# Patient Record
Sex: MALE | Race: WHITE | Employment: OTHER
[De-identification: names, ages, dates, MRNs, and addresses within clinical notes are randomized per-mention and may not be internally consistent; named-entity substitution may affect disease eponyms.]

---

## 2017-01-05 ENCOUNTER — PRIOR ORIGINAL RECORDS (OUTPATIENT)
Dept: OTHER | Age: 72
End: 2017-01-05

## 2017-01-12 ENCOUNTER — PRIOR ORIGINAL RECORDS (OUTPATIENT)
Dept: OTHER | Age: 72
End: 2017-01-12

## 2017-04-25 ENCOUNTER — PRIOR ORIGINAL RECORDS (OUTPATIENT)
Dept: OTHER | Age: 72
End: 2017-04-25

## 2017-04-25 ENCOUNTER — HOSPITAL ENCOUNTER (OUTPATIENT)
Dept: LAB | Facility: HOSPITAL | Age: 72
Discharge: HOME | End: 2017-04-25
Attending: INTERNAL MEDICINE
Payer: MEDICARE

## 2017-04-25 PROCEDURE — 80053 COMPREHEN METABOLIC PANEL: CPT | Performed by: INTERNAL MEDICINE

## 2017-04-25 PROCEDURE — 80061 LIPID PANEL: CPT | Performed by: INTERNAL MEDICINE

## 2017-04-25 PROCEDURE — 36415 COLL VENOUS BLD VENIPUNCTURE: CPT | Performed by: INTERNAL MEDICINE

## 2017-05-01 LAB
ALBUMIN: 3.7 G/DL
ALKALINE PHOSPHATATE(ALK PHOS): 85 IU/L
BILIRUBIN TOTAL: 0.5 MG/DL
BUN: 17 MG/DL
CALCIUM: 9.3 MG/DL
CHLORIDE: 102 MEQ/L
CHOLESTEROL, TOTAL: 133 MG/DL
CREATININE, SERUM: 1.04 MG/DL
GLUCOSE: 194 MG/DL
HDL CHOLESTEROL: 36 MG/DL
LDL CHOLESTEROL: 74 MG/DL
POTASSIUM, SERUM: 4 MEQ/L
PROTEIN, TOTAL: 7 G/DL
SGOT (AST): 12 IU/L
SGPT (ALT): 24 IU/L
SODIUM: 135 MEQ/L
TRIGLYCERIDES: 114 MG/DL

## 2017-08-08 PROBLEM — I15.2 HYPERTENSION ASSOCIATED WITH DIABETES (HCC): Status: ACTIVE | Noted: 2017-08-08

## 2017-08-08 PROBLEM — Z79.4 TYPE 2 DIABETES MELLITUS WITH OTHER CIRCULATORY COMPLICATION, WITH LONG-TERM CURRENT USE OF INSULIN (HCC): Status: ACTIVE | Noted: 2017-08-08

## 2017-08-08 PROBLEM — I15.2 HYPERTENSION ASSOCIATED WITH DIABETES: Status: ACTIVE | Noted: 2017-08-08

## 2017-08-08 PROBLEM — E11.59 TYPE 2 DIABETES MELLITUS WITH OTHER CIRCULATORY COMPLICATION, WITH LONG-TERM CURRENT USE OF INSULIN (HCC): Status: ACTIVE | Noted: 2017-08-08

## 2017-08-08 PROBLEM — E11.59 HYPERTENSION ASSOCIATED WITH DIABETES: Status: ACTIVE | Noted: 2017-08-08

## 2017-08-08 PROBLEM — E11.59 HYPERTENSION ASSOCIATED WITH DIABETES (HCC): Status: ACTIVE | Noted: 2017-08-08

## 2017-11-14 ENCOUNTER — PRIOR ORIGINAL RECORDS (OUTPATIENT)
Dept: OTHER | Age: 72
End: 2017-11-14

## 2017-11-17 LAB
ALBUMIN: 4 G/DL
ALKALINE PHOSPHATATE(ALK PHOS): 92 IU/L
BILIRUBIN TOTAL: 0.4 MG/DL
BUN: 20 MG/DL
CALCIUM: 9.4 MG/DL
CHLORIDE: 97 MEQ/L
CHOLESTEROL, TOTAL: 143 MG/DL
CREATININE, SERUM: 0.9 MG/DL
GLOBULIN: 2.8 G/DL
GLUCOSE: 253 MG/DL
HDL CHOLESTEROL: 33 MG/DL
HEMATOCRIT: 46.7 %
HEMOGLOBIN A1C: 9.8 %
HEMOGLOBIN: 15.9 G/DL
LDL CHOLESTEROL: 90 MG/DL
PLATELETS: 240 K/UL
POTASSIUM, SERUM: 4.3 MEQ/L
PROTEIN, TOTAL: 6.8 G/DL
RED BLOOD COUNT: 5.16 X 10-6/U
SGOT (AST): 12 IU/L
SGPT (ALT): 15 IU/L
SODIUM: 138 MEQ/L
TRIGLYCERIDES: 99 MG/DL
WHITE BLOOD COUNT: 8.4 X 10-3/U

## 2017-11-20 ENCOUNTER — MYAURORA ACCOUNT LINK (OUTPATIENT)
Dept: OTHER | Age: 72
End: 2017-11-20

## 2017-11-20 ENCOUNTER — PRIOR ORIGINAL RECORDS (OUTPATIENT)
Dept: OTHER | Age: 72
End: 2017-11-20

## 2017-12-07 ENCOUNTER — HOSPITAL ENCOUNTER (OUTPATIENT)
Dept: CV DIAGNOSTICS | Facility: HOSPITAL | Age: 72
Discharge: HOME | End: 2017-12-07
Attending: INTERNAL MEDICINE
Payer: MEDICARE

## 2017-12-07 DIAGNOSIS — I25.10 CORONARY ATHEROSCLEROSIS OF NATIVE CORONARY ARTERY: ICD-10-CM

## 2017-12-07 PROCEDURE — 78452 HT MUSCLE IMAGE SPECT MULT: CPT | Performed by: INTERNAL MEDICINE

## 2017-12-07 PROCEDURE — 93018 CV STRESS TEST I&R ONLY: CPT | Performed by: INTERNAL MEDICINE

## 2017-12-07 PROCEDURE — 93017 CV STRESS TEST TRACING ONLY: CPT | Performed by: INTERNAL MEDICINE

## 2017-12-07 NOTE — PROGRESS NOTES
Patient completed his exercise Nuc Stress Test without symptoms however the Nuclear Tech noted a possible change on the post exercise images.  Dr. Tawanda Hermosillo was notified and said that she will follow up with Dr. Rachel Wilkes and discuss the test with t

## 2017-12-19 ENCOUNTER — PRIOR ORIGINAL RECORDS (OUTPATIENT)
Dept: OTHER | Age: 72
End: 2017-12-19

## 2017-12-20 ENCOUNTER — PRIOR ORIGINAL RECORDS (OUTPATIENT)
Dept: OTHER | Age: 72
End: 2017-12-20

## 2017-12-27 NOTE — HISTORICAL OFFICE NOTE
DAYAN MATT  : 1945  ACCOUNT:  [id number]  [phone]  PCP:    TODAY'S DATE: 2017  DICTATED BY:  GEORGI Mansfield   ]      CHIEF COMPLAINT: [Followup of .  CAD, of native vessels.]    HPI:    [On 2017, Kaylin Shepard, a 67year old m CAD. Negative for AAA. SOCIAL HISTORY: SMOKING: Former tobacco use. smokes cigars, advised to quit and quit 12/09. CAFFEINE: 2 sodas daily. ALCOHOL: drinks rarely and wine. EXERCISE: walks. DIET: low fat, low cholesterol. MARITAL STATUS: .  Joyce Lucia Atherosclerosis, extremity, bilateral legs  4. Coronary angioplasty status  5. DM, Type II  6. Dyspnea, unspecified  7. Family History Of Ischemic Heart Disease  8. History of CABG  9. History of smoking  10. Hypercholesteremia, pure  11.  Precordial pain

## 2017-12-27 NOTE — HISTORICAL OFFICE NOTE
DAYNA MATT  : 1945  ACCOUNT:  [id number]  [id number]  PCP:    TODAY'S DATE: 2016  DICTATED BY:  Vidal Choi MD]    CHIEF COMPLAINT: [Followup of .  CAD, of native vessels, Followup of Atherosclerosis of coronary artery bypass graft(s) wit SIGNS: [B/P - 132/70 , Pulse - 64, Weight -  210, Height -   71 , BMI - 29.3 ]    CONS: comfortable. WEIGHT: Discussed and diet, exercise program prescribed. HEAD/FACE: no trauma and normocephalic. EYES: conjunctivae not injected and no xanthelasma.  ENT: m *Toprol XL            25MG      ONE TABLET BY MOUTH ONE TIME DAILY       04/14/15 Aspirin               81MG      4 tablets daily                          04/14/15 Clopidogrel Bisulfate 75MG      1 tablet daily                           04/14/15 Metoprolol

## 2017-12-29 ENCOUNTER — HOSPITAL ENCOUNTER (OUTPATIENT)
Dept: LAB | Facility: HOSPITAL | Age: 72
Discharge: HOME | End: 2017-12-29
Attending: INTERNAL MEDICINE
Payer: MEDICARE

## 2017-12-29 ENCOUNTER — HOSPITAL ENCOUNTER (OUTPATIENT)
Dept: GENERAL RADIOLOGY | Facility: HOSPITAL | Age: 72
Discharge: HOME | End: 2017-12-29
Attending: INTERNAL MEDICINE
Payer: MEDICARE

## 2017-12-29 DIAGNOSIS — I25.10 CAD (CORONARY ARTERY DISEASE): ICD-10-CM

## 2017-12-29 LAB
BUN BLD-MCNC: 20 MG/DL (ref 8–20)
CALCIUM BLD-MCNC: 9.7 MG/DL (ref 8.3–10.3)
CHLORIDE: 104 MMOL/L (ref 101–111)
CO2: 29 MMOL/L (ref 22–32)
CREAT BLD-MCNC: 0.97 MG/DL (ref 0.7–1.3)
ERYTHROCYTE [DISTWIDTH] IN BLOOD BY AUTOMATED COUNT: 12.5 % (ref 11.5–16)
GLUCOSE BLD-MCNC: 170 MG/DL (ref 70–99)
HCT VFR BLD AUTO: 46.1 % (ref 37–53)
HGB BLD-MCNC: 15.7 G/DL (ref 13–17)
MCH RBC QN AUTO: 29.7 PG (ref 27–33.2)
MCHC RBC AUTO-ENTMCNC: 34.1 G/DL (ref 31–37)
MCV RBC AUTO: 87.3 FL (ref 80–99)
PLATELET # BLD AUTO: 238 10(3)UL (ref 150–450)
POTASSIUM SERPL-SCNC: 4.5 MMOL/L (ref 3.6–5.1)
RBC # BLD AUTO: 5.28 X10(6)UL (ref 3.8–5.8)
RED CELL DISTRIBUTION WIDTH-SD: 40.1 FL (ref 35.1–46.3)
SODIUM SERPL-SCNC: 138 MMOL/L (ref 136–144)
WBC # BLD AUTO: 8.2 X10(3) UL (ref 4–13)

## 2017-12-29 PROCEDURE — 71020 XR CHEST PA + LAT CHEST (CPT=71020): CPT | Performed by: INTERNAL MEDICINE

## 2017-12-29 PROCEDURE — 36415 COLL VENOUS BLD VENIPUNCTURE: CPT | Performed by: INTERNAL MEDICINE

## 2017-12-29 PROCEDURE — 85027 COMPLETE CBC AUTOMATED: CPT | Performed by: INTERNAL MEDICINE

## 2017-12-29 PROCEDURE — 80048 BASIC METABOLIC PNL TOTAL CA: CPT | Performed by: INTERNAL MEDICINE

## 2018-01-03 ENCOUNTER — HOSPITAL ENCOUNTER (OUTPATIENT)
Dept: INTERVENTIONAL RADIOLOGY/VASCULAR | Facility: HOSPITAL | Age: 73
Setting detail: OBSERVATION
Discharge: HOME | End: 2018-01-04
Attending: INTERNAL MEDICINE | Admitting: INTERNAL MEDICINE
Payer: MEDICARE

## 2018-01-03 DIAGNOSIS — I25.10 CAD (CORONARY ARTERY DISEASE): ICD-10-CM

## 2018-01-03 PROCEDURE — 82962 GLUCOSE BLOOD TEST: CPT

## 2018-01-03 PROCEDURE — 80048 BASIC METABOLIC PNL TOTAL CA: CPT | Performed by: INTERNAL MEDICINE

## 2018-01-03 PROCEDURE — 93454 CORONARY ARTERY ANGIO S&I: CPT

## 2018-01-03 PROCEDURE — 85730 THROMBOPLASTIN TIME PARTIAL: CPT | Performed by: INTERNAL MEDICINE

## 2018-01-03 PROCEDURE — 83036 HEMOGLOBIN GLYCOSYLATED A1C: CPT | Performed by: NURSE PRACTITIONER

## 2018-01-03 PROCEDURE — 85027 COMPLETE CBC AUTOMATED: CPT | Performed by: INTERNAL MEDICINE

## 2018-01-03 RX ORDER — HEPARIN SODIUM AND DEXTROSE 10000; 5 [USP'U]/100ML; G/100ML
INJECTION INTRAVENOUS CONTINUOUS
Status: DISCONTINUED | OUTPATIENT
Start: 2018-01-03 | End: 2018-01-04

## 2018-01-03 RX ORDER — ASPIRIN 81 MG/1
TABLET, CHEWABLE ORAL
Status: COMPLETED
Start: 2018-01-03 | End: 2018-01-03

## 2018-01-03 RX ORDER — LIDOCAINE HYDROCHLORIDE 10 MG/ML
INJECTION, SOLUTION EPIDURAL; INFILTRATION; INTRACAUDAL; PERINEURAL
Status: COMPLETED
Start: 2018-01-03 | End: 2018-01-03

## 2018-01-03 RX ORDER — SODIUM CHLORIDE 9 MG/ML
INJECTION, SOLUTION INTRAVENOUS CONTINUOUS
Status: ACTIVE | OUTPATIENT
Start: 2018-01-03 | End: 2018-01-03

## 2018-01-03 RX ORDER — ASPIRIN 81 MG/1
324 TABLET, CHEWABLE ORAL ONCE
Status: DISCONTINUED | OUTPATIENT
Start: 2018-01-03 | End: 2018-01-03 | Stop reason: HOSPADM

## 2018-01-03 RX ORDER — HEPARIN SODIUM 5000 [USP'U]/ML
INJECTION, SOLUTION INTRAVENOUS; SUBCUTANEOUS
Status: COMPLETED
Start: 2018-01-03 | End: 2018-01-03

## 2018-01-03 RX ORDER — PANTOPRAZOLE SODIUM 20 MG/1
20 TABLET, DELAYED RELEASE ORAL
Status: DISCONTINUED | OUTPATIENT
Start: 2018-01-04 | End: 2018-01-04

## 2018-01-03 RX ORDER — DEXTROSE MONOHYDRATE 25 G/50ML
50 INJECTION, SOLUTION INTRAVENOUS
Status: DISCONTINUED | OUTPATIENT
Start: 2018-01-03 | End: 2018-01-04

## 2018-01-03 RX ORDER — ASPIRIN 81 MG/1
81 TABLET ORAL DAILY
Status: DISCONTINUED | OUTPATIENT
Start: 2018-01-04 | End: 2018-01-04

## 2018-01-03 RX ORDER — MIDAZOLAM HYDROCHLORIDE 1 MG/ML
INJECTION INTRAMUSCULAR; INTRAVENOUS
Status: COMPLETED
Start: 2018-01-03 | End: 2018-01-03

## 2018-01-03 RX ORDER — SODIUM CHLORIDE 9 MG/ML
INJECTION, SOLUTION INTRAVENOUS CONTINUOUS
Status: DISCONTINUED | OUTPATIENT
Start: 2018-01-03 | End: 2018-01-04

## 2018-01-03 RX ORDER — METOPROLOL SUCCINATE 25 MG/1
25 TABLET, EXTENDED RELEASE ORAL
Status: DISCONTINUED | OUTPATIENT
Start: 2018-01-04 | End: 2018-01-04

## 2018-01-03 RX ORDER — CLOPIDOGREL BISULFATE 75 MG/1
TABLET ORAL
Status: COMPLETED
Start: 2018-01-03 | End: 2018-01-03

## 2018-01-03 RX ORDER — HEPARIN SODIUM AND DEXTROSE 10000; 5 [USP'U]/100ML; G/100ML
1000 INJECTION INTRAVENOUS ONCE
Status: COMPLETED | OUTPATIENT
Start: 2018-01-03 | End: 2018-01-03

## 2018-01-03 RX ADMIN — HEPARIN SODIUM AND DEXTROSE 1000 UNITS/HR: 10000; 5 INJECTION INTRAVENOUS at 21:48:00

## 2018-01-03 RX ADMIN — CLOPIDOGREL BISULFATE 75 MG: 75 TABLET ORAL at 12:22:00

## 2018-01-03 RX ADMIN — SODIUM CHLORIDE: 9 INJECTION, SOLUTION INTRAVENOUS at 12:00:00

## 2018-01-03 NOTE — H&P
History & Physical Examination    Patient Name: Good Abdul  MRN: [id number]  [facility]: [id number]  YOB: 1945    Diagnosis: CAD with prior CABG, PCI's, HLD, DM Type II, chest pain, abnormal stress test     History of Present Illness: 67year old • Esophageal reflux    • Osteoarthrosis, unspecified whether generalized or localized, unspecified site    • Other and unspecified hyperlipidemia    • Pancreatitis 1986   • Type II or unspecified type diabetes mellitus without mention of complication, no

## 2018-01-03 NOTE — PROGRESS NOTES
66 y/o male with hx of CABG, DM, stent left main, abnormal thallium and angina    RFA  Angio graft OM and PDA 90 % but now nearly occluded into PDA  Heparin overnight and discuss.

## 2018-01-04 ENCOUNTER — APPOINTMENT (OUTPATIENT)
Dept: INTERVENTIONAL RADIOLOGY/VASCULAR | Facility: HOSPITAL | Age: 73
End: 2018-01-04
Attending: INTERNAL MEDICINE
Payer: MEDICARE

## 2018-01-04 VITALS
OXYGEN SATURATION: 97 % | TEMPERATURE: 98 F | SYSTOLIC BLOOD PRESSURE: 135 MMHG | WEIGHT: 205 LBS | BODY MASS INDEX: 29.02 KG/M2 | RESPIRATION RATE: 20 BRPM | HEIGHT: 70.5 IN | DIASTOLIC BLOOD PRESSURE: 77 MMHG | HEART RATE: 67 BPM

## 2018-01-04 PROCEDURE — 85049 AUTOMATED PLATELET COUNT: CPT | Performed by: INTERNAL MEDICINE

## 2018-01-04 PROCEDURE — 82962 GLUCOSE BLOOD TEST: CPT

## 2018-01-04 PROCEDURE — 85730 THROMBOPLASTIN TIME PARTIAL: CPT | Performed by: INTERNAL MEDICINE

## 2018-01-04 PROCEDURE — B2101ZZ FLUOROSCOPY OF SINGLE CORONARY ARTERY USING LOW OSMOLAR CONTRAST: ICD-10-PCS | Performed by: INTERNAL MEDICINE

## 2018-01-04 RX ORDER — CLOPIDOGREL BISULFATE 75 MG/1
75 TABLET ORAL DAILY
Status: DISCONTINUED | OUTPATIENT
Start: 2018-01-04 | End: 2018-01-04

## 2018-01-04 RX ADMIN — ASPIRIN 81 MG: 81 TABLET ORAL at 08:46:00

## 2018-01-04 RX ADMIN — METOPROLOL SUCCINATE 25 MG: 25 TABLET, EXTENDED RELEASE ORAL at 07:02:00

## 2018-01-04 RX ADMIN — HEPARIN SODIUM AND DEXTROSE 1150 UNITS/HR: 10000; 5 INJECTION INTRAVENOUS at 04:31:00

## 2018-01-04 RX ADMIN — CLOPIDOGREL BISULFATE 75 MG: 75 TABLET ORAL at 10:15:00

## 2018-01-04 RX ADMIN — PANTOPRAZOLE SODIUM 20 MG: 20 TABLET, DELAYED RELEASE ORAL at 07:02:00

## 2018-01-04 NOTE — PROGRESS NOTES
Dr. Cort Galeazzi in to discuss plan of care with patient and family. Patient transferred to [facility] via bed. Bedside handoff given to [facility]. Right groin remains stable.

## 2018-01-04 NOTE — PROGRESS NOTES
NURSING DISCHARGE NOTE    Received orders to discharge pt. No new medications prescribed. Instructed pt about follow-up appointments per MD order. Provided home care instructions s/p angiogram. Pt verbalized understanding. All questions answered.     Re

## 2018-01-04 NOTE — PLAN OF CARE
CARDIOVASCULAR - ADULT    • Maintains optimal cardiac output and hemodynamic stability Progressing    • Absence of cardiac arrhythmias or at baseline Progressing          Assumed care of pt at 0700. Denies any pain or SOB on RA. Sinus rhythm, HR 60s.  Hepa

## 2018-01-04 NOTE — PROGRESS NOTES
[facility]  Cardiology Progress Note    [facility] SURGERY Patient Status:  Observation    2/3/1945 MRN [id number]   [facility] 8NE-A Attending Farzaneh Holley MD   Hosp Day # 0 PCP Ian BERRY     Subjective:  Doing well with no chest pa normal EF  · DM  · HLD     Plan: Discussed with Dr. Suasna Casanova who reviewed cath in detail with family and Mr. Palak Francisco     · Home today. · Continue aggressive medical management. · Resume metformin 48 hrs post cath.    · Follow up in the office with GEORGI arevalo

## 2018-01-04 NOTE — PLAN OF CARE
Patient up from cath lab at 31 26 62. Admission completed. Right groin had old drainage, appears stable. Right groin soft, non-tender. Vitals are stable. Report given to night shift RN.

## 2018-01-04 NOTE — CM/SW NOTE
01/04/18 1200   CM/SW Screening   Referral Source Social Work (self-referral)   Information Source Chart review;Nursing rounds   Patient was screened during rounds and no needs are identified at this time. RN to contact SW/CM if needs arise.

## 2018-01-15 ENCOUNTER — MYAURORA ACCOUNT LINK (OUTPATIENT)
Dept: OTHER | Age: 73
End: 2018-01-15

## 2018-01-15 ENCOUNTER — PRIOR ORIGINAL RECORDS (OUTPATIENT)
Dept: OTHER | Age: 73
End: 2018-01-15

## 2018-02-13 ENCOUNTER — PRIOR ORIGINAL RECORDS (OUTPATIENT)
Dept: OTHER | Age: 73
End: 2018-02-13

## 2018-03-08 ENCOUNTER — HOSPITAL ENCOUNTER (OUTPATIENT)
Dept: LAB | Facility: HOSPITAL | Age: 73
Discharge: HOME | End: 2018-03-08
Attending: INTERNAL MEDICINE
Payer: MEDICARE

## 2018-03-08 DIAGNOSIS — E11.59 TYPE 2 DIABETES MELLITUS WITH OTHER CIRCULATORY COMPLICATION, WITH LONG-TERM CURRENT USE OF INSULIN (HCC): ICD-10-CM

## 2018-03-08 DIAGNOSIS — E11.69 HYPERLIPIDEMIA ASSOCIATED WITH TYPE 2 DIABETES MELLITUS (HCC): ICD-10-CM

## 2018-03-08 DIAGNOSIS — E11.51 DIABETES MELLITUS WITH PERIPHERAL ANGIOPATHY (HCC): ICD-10-CM

## 2018-03-08 DIAGNOSIS — Z79.4 TYPE 2 DIABETES MELLITUS WITH OTHER CIRCULATORY COMPLICATION, WITH LONG-TERM CURRENT USE OF INSULIN (HCC): ICD-10-CM

## 2018-03-08 DIAGNOSIS — E11.59 HYPERTENSION ASSOCIATED WITH DIABETES (HCC): ICD-10-CM

## 2018-03-08 DIAGNOSIS — I15.2 HYPERTENSION ASSOCIATED WITH DIABETES (HCC): ICD-10-CM

## 2018-03-08 DIAGNOSIS — E78.5 HYPERLIPIDEMIA ASSOCIATED WITH TYPE 2 DIABETES MELLITUS (HCC): ICD-10-CM

## 2018-03-08 LAB
BUN BLD-MCNC: 21 MG/DL (ref 8–20)
CALCIUM BLD-MCNC: 9.8 MG/DL (ref 8.3–10.3)
CHLORIDE: 103 MMOL/L (ref 101–111)
CO2: 30 MMOL/L (ref 22–32)
CREAT BLD-MCNC: 1.03 MG/DL (ref 0.7–1.3)
GLUCOSE BLD-MCNC: 191 MG/DL (ref 70–99)
POTASSIUM SERPL-SCNC: 4.4 MMOL/L (ref 3.6–5.1)
SODIUM SERPL-SCNC: 139 MMOL/L (ref 136–144)

## 2018-03-08 PROCEDURE — 80048 BASIC METABOLIC PNL TOTAL CA: CPT

## 2018-03-08 PROCEDURE — 84681 ASSAY OF C-PEPTIDE: CPT

## 2018-03-08 PROCEDURE — 36415 COLL VENOUS BLD VENIPUNCTURE: CPT

## 2018-03-09 LAB — C-PEPTIDE, SERUM OR PLASMA: 1.2 NG/ML

## 2018-05-15 ENCOUNTER — MYAURORA ACCOUNT LINK (OUTPATIENT)
Dept: OTHER | Age: 73
End: 2018-05-15

## 2018-05-15 ENCOUNTER — PRIOR ORIGINAL RECORDS (OUTPATIENT)
Dept: OTHER | Age: 73
End: 2018-05-15

## 2018-07-17 PROBLEM — E11.51 DIABETES MELLITUS WITH PERIPHERAL ANGIOPATHY (HCC): Status: ACTIVE | Noted: 2018-07-17

## 2018-11-16 ENCOUNTER — PRIOR ORIGINAL RECORDS (OUTPATIENT)
Dept: OTHER | Age: 73
End: 2018-11-16

## 2018-11-16 ENCOUNTER — HOSPITAL ENCOUNTER (OUTPATIENT)
Dept: LAB | Facility: HOSPITAL | Age: 73
Discharge: HOME | End: 2018-11-16
Attending: INTERNAL MEDICINE
Payer: MEDICARE

## 2018-11-16 PROCEDURE — 85025 COMPLETE CBC W/AUTO DIFF WBC: CPT | Performed by: INTERNAL MEDICINE

## 2018-11-16 PROCEDURE — 36415 COLL VENOUS BLD VENIPUNCTURE: CPT | Performed by: INTERNAL MEDICINE

## 2018-11-16 PROCEDURE — 80061 LIPID PANEL: CPT | Performed by: INTERNAL MEDICINE

## 2018-11-16 PROCEDURE — 80053 COMPREHEN METABOLIC PANEL: CPT | Performed by: INTERNAL MEDICINE

## 2018-11-20 ENCOUNTER — PRIOR ORIGINAL RECORDS (OUTPATIENT)
Dept: OTHER | Age: 73
End: 2018-11-20

## 2018-11-20 LAB
ALBUMIN: 3.9 G/DL
ALKALINE PHOSPHATATE(ALK PHOS): 81 IU/L
BILIRUBIN TOTAL: 0.6 MG/DL
BUN: 22 MG/DL
CALCIUM: 8.8 MG/DL
CHLORIDE: 104 MEQ/L
CHOLESTEROL, TOTAL: 147 MG/DL
CREATININE, SERUM: 1.02 MG/DL
GLOBULIN: 3.3 G/DL
GLUCOSE: 131 MG/DL
HDL CHOLESTEROL: 36 MG/DL
HEMATOCRIT: 47.5 %
HEMOGLOBIN: 16.2 G/DL
LDL CHOLESTEROL: 90 MG/DL
PLATELETS: 217 K/UL
POTASSIUM, SERUM: 4.2 MEQ/L
PROTEIN, TOTAL: 7.2 G/DL
RED BLOOD COUNT: 5.45 X 10-6/U
SGOT (AST): 13 IU/L
SGPT (ALT): 20 IU/L
SODIUM: 139 MEQ/L
TRIGLYCERIDES: 103 MG/DL
WHITE BLOOD COUNT: 9.2 X 10-3/U

## 2018-11-26 ENCOUNTER — PRIOR ORIGINAL RECORDS (OUTPATIENT)
Dept: OTHER | Age: 73
End: 2018-11-26

## 2018-11-27 LAB — HEMOGLOBIN A1C: 11.1 %

## 2018-12-07 ENCOUNTER — HOSPITAL ENCOUNTER (OUTPATIENT)
Dept: CV DIAGNOSTICS | Facility: HOSPITAL | Age: 73
Discharge: HOME | End: 2018-12-07
Attending: INTERNAL MEDICINE
Payer: MEDICARE

## 2018-12-07 ENCOUNTER — MYAURORA ACCOUNT LINK (OUTPATIENT)
Dept: OTHER | Age: 73
End: 2018-12-07

## 2018-12-07 ENCOUNTER — HOSPITAL ENCOUNTER (OUTPATIENT)
Dept: CV DIAGNOSTICS | Facility: HOSPITAL | Age: 73
Discharge: HOME | End: 2018-12-07
Attending: INTERNAL MEDICINE

## 2018-12-07 DIAGNOSIS — I25.10 CAD (CORONARY ARTERY DISEASE): ICD-10-CM

## 2018-12-07 DIAGNOSIS — I25.10 CORONARY ARTERIOSCLEROSIS: ICD-10-CM

## 2018-12-07 PROCEDURE — 78452 HT MUSCLE IMAGE SPECT MULT: CPT | Performed by: INTERNAL MEDICINE

## 2018-12-07 PROCEDURE — 93017 CV STRESS TEST TRACING ONLY: CPT | Performed by: INTERNAL MEDICINE

## 2018-12-07 PROCEDURE — 93018 CV STRESS TEST I&R ONLY: CPT | Performed by: INTERNAL MEDICINE

## 2018-12-10 ENCOUNTER — PRIOR ORIGINAL RECORDS (OUTPATIENT)
Dept: OTHER | Age: 73
End: 2018-12-10

## 2018-12-10 ENCOUNTER — MYAURORA ACCOUNT LINK (OUTPATIENT)
Dept: OTHER | Age: 73
End: 2018-12-10

## 2019-01-01 ENCOUNTER — EXTERNAL RECORD (OUTPATIENT)
Dept: HEALTH INFORMATION MANAGEMENT | Facility: OTHER | Age: 74
End: 2019-01-01

## 2019-02-28 VITALS
BODY MASS INDEX: 29.26 KG/M2 | SYSTOLIC BLOOD PRESSURE: 158 MMHG | DIASTOLIC BLOOD PRESSURE: 76 MMHG | WEIGHT: 209 LBS | HEIGHT: 71 IN | HEART RATE: 62 BPM

## 2019-02-28 VITALS
WEIGHT: 205 LBS | DIASTOLIC BLOOD PRESSURE: 62 MMHG | BODY MASS INDEX: 28.7 KG/M2 | HEIGHT: 71 IN | SYSTOLIC BLOOD PRESSURE: 130 MMHG | HEART RATE: 62 BPM

## 2019-02-28 VITALS
HEIGHT: 71 IN | HEART RATE: 60 BPM | SYSTOLIC BLOOD PRESSURE: 130 MMHG | DIASTOLIC BLOOD PRESSURE: 74 MMHG | WEIGHT: 210 LBS | BODY MASS INDEX: 29.4 KG/M2

## 2019-02-28 VITALS
HEART RATE: 66 BPM | BODY MASS INDEX: 28.84 KG/M2 | WEIGHT: 206 LBS | SYSTOLIC BLOOD PRESSURE: 108 MMHG | DIASTOLIC BLOOD PRESSURE: 56 MMHG | HEIGHT: 71 IN

## 2019-02-28 VITALS
SYSTOLIC BLOOD PRESSURE: 120 MMHG | HEIGHT: 71 IN | HEART RATE: 68 BPM | DIASTOLIC BLOOD PRESSURE: 64 MMHG | BODY MASS INDEX: 28.7 KG/M2 | WEIGHT: 205 LBS

## 2019-02-28 VITALS — HEART RATE: 70 BPM | SYSTOLIC BLOOD PRESSURE: 100 MMHG | WEIGHT: 204 LBS | DIASTOLIC BLOOD PRESSURE: 58 MMHG

## 2019-03-01 VITALS
BODY MASS INDEX: 29.54 KG/M2 | HEIGHT: 71 IN | SYSTOLIC BLOOD PRESSURE: 124 MMHG | WEIGHT: 211 LBS | HEART RATE: 70 BPM | DIASTOLIC BLOOD PRESSURE: 64 MMHG

## 2019-03-12 ENCOUNTER — TELEPHONE (OUTPATIENT)
Dept: CARDIOLOGY | Age: 74
End: 2019-03-12

## 2019-03-19 RX ORDER — ISOSORBIDE MONONITRATE 30 MG/1
1 TABLET, EXTENDED RELEASE ORAL DAILY
COMMUNITY
Start: 2019-01-28 | End: 2020-03-30 | Stop reason: SDUPTHER

## 2019-03-19 RX ORDER — METFORMIN HYDROCHLORIDE 500 MG/1
TABLET, EXTENDED RELEASE ORAL
COMMUNITY
Start: 2018-11-26

## 2019-03-19 RX ORDER — ATORVASTATIN CALCIUM 10 MG/1
TABLET, FILM COATED ORAL
COMMUNITY
Start: 2019-02-06 | End: 2020-03-05 | Stop reason: SDUPTHER

## 2019-03-19 RX ORDER — INSULIN GLARGINE 100 [IU]/ML
INJECTION, SOLUTION SUBCUTANEOUS
COMMUNITY
End: 2020-12-07 | Stop reason: ALTCHOICE

## 2019-03-19 RX ORDER — GLIPIZIDE 10 MG/1
1 TABLET, FILM COATED, EXTENDED RELEASE ORAL 2 TIMES DAILY
COMMUNITY
Start: 2010-12-20

## 2019-03-19 RX ORDER — METOPROLOL SUCCINATE 25 MG/1
1 TABLET, EXTENDED RELEASE ORAL DAILY
COMMUNITY
Start: 2019-02-12 | End: 2020-01-31 | Stop reason: SDUPTHER

## 2019-03-19 RX ORDER — OMEPRAZOLE 20 MG/1
CAPSULE, DELAYED RELEASE ORAL
COMMUNITY

## 2019-03-19 RX ORDER — NAPROXEN SODIUM 220 MG
TABLET ORAL
COMMUNITY

## 2019-03-19 RX ORDER — CLOPIDOGREL BISULFATE 75 MG/1
1 TABLET ORAL DAILY
COMMUNITY
Start: 2019-02-21 | End: 2020-03-05 | Stop reason: SDUPTHER

## 2019-04-04 ENCOUNTER — TELEPHONE (OUTPATIENT)
Dept: CARDIOLOGY | Age: 74
End: 2019-04-04

## 2019-05-06 PROBLEM — I25.810 ATHEROSCLEROSIS OF CORONARY ARTERY BYPASS GRAFT(S) WITHOUT ANGINA PECTORIS: Status: ACTIVE | Noted: 2019-05-06

## 2019-05-06 RX ORDER — TRAMADOL HYDROCHLORIDE 50 MG/1
50 TABLET ORAL
Refills: 0 | COMMUNITY
Start: 2019-02-27 | End: 2019-05-07 | Stop reason: SDUPTHER

## 2019-05-07 ENCOUNTER — OFFICE VISIT (OUTPATIENT)
Dept: CARDIOLOGY | Age: 74
End: 2019-05-07

## 2019-05-07 DIAGNOSIS — E78.00 PURE HYPERCHOLESTEROLEMIA: ICD-10-CM

## 2019-05-07 DIAGNOSIS — I25.10 CORONARY ARTERY DISEASE INVOLVING NATIVE HEART WITHOUT ANGINA PECTORIS, UNSPECIFIED VESSEL OR LESION TYPE: Primary | ICD-10-CM

## 2019-05-07 PROBLEM — E11.9 DIABETES MELLITUS (CMD): Status: ACTIVE | Noted: 2017-08-08

## 2019-05-07 PROCEDURE — 99214 OFFICE O/P EST MOD 30 MIN: CPT | Performed by: NURSE PRACTITIONER

## 2019-05-07 SDOH — HEALTH STABILITY: PHYSICAL HEALTH: ON AVERAGE, HOW MANY MINUTES DO YOU ENGAGE IN EXERCISE AT THIS LEVEL?: 0 MIN

## 2019-05-07 SDOH — HEALTH STABILITY: PHYSICAL HEALTH: ON AVERAGE, HOW MANY DAYS PER WEEK DO YOU ENGAGE IN MODERATE TO STRENUOUS EXERCISE (LIKE A BRISK WALK)?: 0 DAYS

## 2019-05-07 ASSESSMENT — ENCOUNTER SYMPTOMS
LOSS OF BALANCE: 1
CONSTITUTIONAL NEGATIVE: 1
RESPIRATORY NEGATIVE: 1
BRUISES/BLEEDS EASILY: 1
ENDOCRINE NEGATIVE: 1
BLURRED VISION: 1
BACK PAIN: 1
GASTROINTESTINAL NEGATIVE: 1

## 2019-05-07 ASSESSMENT — PATIENT HEALTH QUESTIONNAIRE - PHQ9
SUM OF ALL RESPONSES TO PHQ9 QUESTIONS 1 AND 2: 0
1. LITTLE INTEREST OR PLEASURE IN DOING THINGS: NOT AT ALL
SUM OF ALL RESPONSES TO PHQ9 QUESTIONS 1 AND 2: 0
2. FEELING DOWN, DEPRESSED OR HOPELESS: NOT AT ALL

## 2019-05-07 ASSESSMENT — PAIN SCALES - GENERAL: PAINLEVEL: 0

## 2019-11-11 ENCOUNTER — OFFICE VISIT (OUTPATIENT)
Dept: CARDIOLOGY | Age: 74
End: 2019-11-11

## 2019-11-11 VITALS
SYSTOLIC BLOOD PRESSURE: 104 MMHG | HEART RATE: 64 BPM | HEIGHT: 70 IN | WEIGHT: 198 LBS | DIASTOLIC BLOOD PRESSURE: 60 MMHG | BODY MASS INDEX: 28.35 KG/M2

## 2019-11-11 DIAGNOSIS — I25.810 ATHEROSCLEROSIS OF CORONARY ARTERY BYPASS GRAFT OF NATIVE HEART WITHOUT ANGINA PECTORIS: ICD-10-CM

## 2019-11-11 DIAGNOSIS — Z79.4 TYPE 2 DIABETES MELLITUS WITHOUT COMPLICATION, WITH LONG-TERM CURRENT USE OF INSULIN (CMD): ICD-10-CM

## 2019-11-11 DIAGNOSIS — Z95.1 HX OF CABG: ICD-10-CM

## 2019-11-11 DIAGNOSIS — I25.10 CORONARY ARTERY DISEASE INVOLVING NATIVE HEART WITHOUT ANGINA PECTORIS, UNSPECIFIED VESSEL OR LESION TYPE: ICD-10-CM

## 2019-11-11 DIAGNOSIS — Z95.5 PRESENCE OF DRUG COATED STENT IN LAD CORONARY ARTERY: ICD-10-CM

## 2019-11-11 DIAGNOSIS — R06.09 DYSPNEA ON EXERTION: Primary | ICD-10-CM

## 2019-11-11 DIAGNOSIS — E78.00 PURE HYPERCHOLESTEROLEMIA: ICD-10-CM

## 2019-11-11 DIAGNOSIS — E11.9 TYPE 2 DIABETES MELLITUS WITHOUT COMPLICATION, WITH LONG-TERM CURRENT USE OF INSULIN (CMD): ICD-10-CM

## 2019-11-11 PROCEDURE — 99215 OFFICE O/P EST HI 40 MIN: CPT | Performed by: INTERNAL MEDICINE

## 2019-11-11 ASSESSMENT — ENCOUNTER SYMPTOMS
HEMATOCHEZIA: 0
WEIGHT LOSS: 0
WEIGHT GAIN: 0
CHILLS: 0
SLEEP DISTURBANCES DUE TO BREATHING: 1
ALLERGIC/IMMUNOLOGIC COMMENTS: NO NEW FOOD ALLERGIES
COUGH: 0
BRUISES/BLEEDS EASILY: 0
SUSPICIOUS LESIONS: 0
FEVER: 0
HEMOPTYSIS: 0

## 2019-11-14 ENCOUNTER — TELEPHONE (OUTPATIENT)
Dept: CARDIOLOGY | Age: 74
End: 2019-11-14

## 2019-11-14 ENCOUNTER — HOSPITAL ENCOUNTER (OUTPATIENT)
Dept: GENERAL RADIOLOGY | Facility: HOSPITAL | Age: 74
Discharge: HOME | End: 2019-11-14
Attending: INTERNAL MEDICINE
Payer: MEDICARE

## 2019-11-14 ENCOUNTER — HOSPITAL ENCOUNTER (OUTPATIENT)
Dept: CT IMAGING | Facility: HOSPITAL | Age: 74
Discharge: HOME | End: 2019-11-14
Attending: INTERNAL MEDICINE
Payer: MEDICARE

## 2019-11-14 ENCOUNTER — LAB ENCOUNTER (OUTPATIENT)
Dept: LAB | Facility: HOSPITAL | Age: 74
End: 2019-11-14
Attending: INTERNAL MEDICINE
Payer: MEDICARE

## 2019-11-14 DIAGNOSIS — I25.10 CORONARY ATHEROSCLEROSIS OF NATIVE CORONARY ARTERY: ICD-10-CM

## 2019-11-14 DIAGNOSIS — E78.00 PURE HYPERCHOLESTEROLEMIA: ICD-10-CM

## 2019-11-14 DIAGNOSIS — I25.810 ATHEROSCLEROSIS OF CORONARY ARTERY BYPASS GRAFT OF NATIVE HEART WITHOUT ANGINA PECTORIS: ICD-10-CM

## 2019-11-14 DIAGNOSIS — I25.810 CORONARY ATHEROSCLEROSIS OF AUTOLOGOUS VEIN BYPASS GRAFT: ICD-10-CM

## 2019-11-14 DIAGNOSIS — I25.10 CORONARY ARTERY DISEASE INVOLVING NATIVE HEART WITHOUT ANGINA PECTORIS, UNSPECIFIED VESSEL OR LESION TYPE: ICD-10-CM

## 2019-11-14 DIAGNOSIS — Z95.1 HX OF CABG: ICD-10-CM

## 2019-11-14 DIAGNOSIS — R93.89 ABNORMAL CHEST X-RAY: Primary | ICD-10-CM

## 2019-11-14 DIAGNOSIS — R06.00 DYSPNEA ON EXERTION: ICD-10-CM

## 2019-11-14 DIAGNOSIS — R06.00 DYSPNEA ON EXERTION: Primary | ICD-10-CM

## 2019-11-14 DIAGNOSIS — Z95.1 POSTSURGICAL AORTOCORONARY BYPASS STATUS: ICD-10-CM

## 2019-11-14 DIAGNOSIS — R93.89 ABNORMAL CHEST X-RAY: ICD-10-CM

## 2019-11-14 LAB
ABSOLUTE IMMATURE GRANULOCYTES (OFFPRE24): NORMAL
ALBUMIN SERPL-MCNC: 3.7 G/DL
ALBUMIN/GLOB SERPL: NORMAL {RATIO}
ALP SERPL-CCNC: 111 U/L
ALT SERPL-CCNC: 15 U/L
ANION GAP SERPL CALC-SCNC: NORMAL MMOL/L
AST SERPL-CCNC: 10 U/L
BASO+EOS+MONOS # BLD: NORMAL 10*3/UL
BASO+EOS+MONOS NFR BLD: NORMAL %
BASOPHILS # BLD: NORMAL 10*3/UL
BASOPHILS NFR BLD: NORMAL %
BILIRUB SERPL-MCNC: 0.5 MG/DL
BUN SERPL-MCNC: 25 MG/DL
BUN/CREAT SERPL: NORMAL
CALCIUM SERPL-MCNC: 9.5 MG/DL
CHLORIDE SERPL-SCNC: 102 MMOL/L
CO2 SERPL-SCNC: NORMAL MMOL/L
CREAT SERPL-MCNC: 1.23 MG/DL
DIFFERENTIAL METHOD BLD: NORMAL
EOSINOPHIL # BLD: NORMAL 10*3/UL
EOSINOPHIL NFR BLD: NORMAL %
ERYTHROCYTE [DISTWIDTH] IN BLOOD: NORMAL %
GLOBULIN SER-MCNC: NORMAL G/DL
GLUCOSE SERPL-MCNC: 206 MG/DL
HCT VFR BLD CALC: 45.8 %
HGB BLD-MCNC: 15.1 G/DL
IMMATURE GRANULOCYTES (OFFPRE25): NORMAL
LENGTH OF FAST TIME PATIENT: NORMAL H
LYMPHOCYTES # BLD: NORMAL 10*3/UL
LYMPHOCYTES NFR BLD: NORMAL %
MCH RBC QN AUTO: NORMAL PG
MCHC RBC AUTO-ENTMCNC: NORMAL G/DL
MCV RBC AUTO: NORMAL FL
MONOCYTES # BLD: NORMAL 10*3/UL
MONOCYTES NFR BLD: NORMAL %
MPV (OFFPRE2): NORMAL
NEUTROPHILS # BLD: NORMAL 10*3/UL
NEUTROPHILS NFR BLD: NORMAL %
NRBC BLD MANUAL-RTO: NORMAL %
PLAT MORPH BLD: NORMAL
PLATELET # BLD: 280 10*3/UL
POTASSIUM SERPL-SCNC: 4.4 MMOL/L
PROT SERPL-MCNC: 7.5 G/DL
RBC # BLD: 5.27 10*6/UL
RBC MORPH BLD: NORMAL
SODIUM SERPL-SCNC: 137 MMOL/L
WBC # BLD: 10.5 10*3/UL
WBC MORPH BLD: NORMAL

## 2019-11-14 PROCEDURE — 80053 COMPREHEN METABOLIC PANEL: CPT

## 2019-11-14 PROCEDURE — 36415 COLL VENOUS BLD VENIPUNCTURE: CPT

## 2019-11-14 PROCEDURE — 71250 CT THORAX DX C-: CPT | Performed by: INTERNAL MEDICINE

## 2019-11-14 PROCEDURE — 71045 X-RAY EXAM CHEST 1 VIEW: CPT | Performed by: INTERNAL MEDICINE

## 2019-11-14 PROCEDURE — 85025 COMPLETE CBC W/AUTO DIFF WBC: CPT

## 2019-11-14 PROCEDURE — 80061 LIPID PANEL: CPT

## 2019-11-18 ENCOUNTER — TELEPHONE (OUTPATIENT)
Dept: CARDIOLOGY | Age: 74
End: 2019-11-18

## 2019-11-18 ENCOUNTER — E-ADVICE (OUTPATIENT)
Dept: CARDIOLOGY | Age: 74
End: 2019-11-18

## 2019-11-19 DIAGNOSIS — E78.00 PURE HYPERCHOLESTEROLEMIA: ICD-10-CM

## 2019-11-19 DIAGNOSIS — Z95.1 HX OF CABG: ICD-10-CM

## 2019-11-19 DIAGNOSIS — I25.810 ATHEROSCLEROSIS OF CORONARY ARTERY BYPASS GRAFT OF NATIVE HEART WITHOUT ANGINA PECTORIS: ICD-10-CM

## 2019-11-19 DIAGNOSIS — R06.09 DYSPNEA ON EXERTION: ICD-10-CM

## 2019-11-19 DIAGNOSIS — I25.10 CORONARY ARTERY DISEASE INVOLVING NATIVE HEART WITHOUT ANGINA PECTORIS, UNSPECIFIED VESSEL OR LESION TYPE: ICD-10-CM

## 2019-11-20 ENCOUNTER — HOSPITAL ENCOUNTER (OUTPATIENT)
Dept: INTERVENTIONAL RADIOLOGY/VASCULAR | Facility: HOSPITAL | Age: 74
Discharge: HOME | End: 2019-11-21
Attending: INTERNAL MEDICINE | Admitting: INTERNAL MEDICINE
Payer: MEDICARE

## 2019-11-20 DIAGNOSIS — I25.10 CAD (CORONARY ARTERY DISEASE): ICD-10-CM

## 2019-11-20 DIAGNOSIS — R91.8 MULTIPLE LUNG NODULES ON CT: Primary | ICD-10-CM

## 2019-11-20 DIAGNOSIS — R91.8 LUNG MASS: ICD-10-CM

## 2019-11-20 PROCEDURE — B2181ZZ FLUOROSCOPY OF LEFT INTERNAL MAMMARY BYPASS GRAFT USING LOW OSMOLAR CONTRAST: ICD-10-PCS | Performed by: INTERNAL MEDICINE

## 2019-11-20 PROCEDURE — B2111ZZ FLUOROSCOPY OF MULTIPLE CORONARY ARTERIES USING LOW OSMOLAR CONTRAST: ICD-10-PCS | Performed by: INTERNAL MEDICINE

## 2019-11-20 PROCEDURE — B2151ZZ FLUOROSCOPY OF LEFT HEART USING LOW OSMOLAR CONTRAST: ICD-10-PCS | Performed by: INTERNAL MEDICINE

## 2019-11-20 PROCEDURE — 93459 L HRT ART/GRFT ANGIO: CPT | Performed by: INTERNAL MEDICINE

## 2019-11-20 PROCEDURE — B2131ZZ FLUOROSCOPY OF MULTIPLE CORONARY ARTERY BYPASS GRAFTS USING LOW OSMOLAR CONTRAST: ICD-10-PCS | Performed by: INTERNAL MEDICINE

## 2019-11-20 PROCEDURE — 4A023N7 MEASUREMENT OF CARDIAC SAMPLING AND PRESSURE, LEFT HEART, PERCUTANEOUS APPROACH: ICD-10-PCS | Performed by: INTERNAL MEDICINE

## 2019-11-20 RX ORDER — ASPIRIN 81 MG/1
TABLET, CHEWABLE ORAL
Status: COMPLETED
Start: 2019-11-20 | End: 2019-11-20

## 2019-11-20 RX ORDER — ATORVASTATIN CALCIUM 10 MG/1
10 TABLET, FILM COATED ORAL NIGHTLY
Status: DISCONTINUED | OUTPATIENT
Start: 2019-11-20 | End: 2019-11-21

## 2019-11-20 RX ORDER — DEXTROSE MONOHYDRATE 25 G/50ML
50 INJECTION, SOLUTION INTRAVENOUS
Status: DISCONTINUED | OUTPATIENT
Start: 2019-11-20 | End: 2019-11-21

## 2019-11-20 RX ORDER — ISOSORBIDE MONONITRATE 30 MG/1
30 TABLET, EXTENDED RELEASE ORAL DAILY
Status: DISCONTINUED | OUTPATIENT
Start: 2019-11-20 | End: 2019-11-21

## 2019-11-20 RX ORDER — ISOSORBIDE MONONITRATE 30 MG/1
30 TABLET, EXTENDED RELEASE ORAL DAILY
COMMUNITY
End: 2022-01-11

## 2019-11-20 RX ORDER — PANTOPRAZOLE SODIUM 20 MG/1
20 TABLET, DELAYED RELEASE ORAL EVERY MORNING
Status: DISCONTINUED | OUTPATIENT
Start: 2019-11-20 | End: 2019-11-21

## 2019-11-20 RX ORDER — TEMAZEPAM 15 MG/1
15 CAPSULE ORAL NIGHTLY PRN
Status: DISCONTINUED | OUTPATIENT
Start: 2019-11-20 | End: 2019-11-21

## 2019-11-20 RX ORDER — LIDOCAINE HYDROCHLORIDE 10 MG/ML
INJECTION, SOLUTION EPIDURAL; INFILTRATION; INTRACAUDAL; PERINEURAL
Status: COMPLETED
Start: 2019-11-20 | End: 2019-11-20

## 2019-11-20 RX ORDER — ACETAMINOPHEN 325 MG/1
650 TABLET ORAL EVERY 6 HOURS PRN
Status: DISCONTINUED | OUTPATIENT
Start: 2019-11-20 | End: 2019-11-21

## 2019-11-20 RX ORDER — CLOPIDOGREL BISULFATE 75 MG/1
75 TABLET ORAL DAILY
Status: DISCONTINUED | OUTPATIENT
Start: 2019-11-21 | End: 2019-11-21

## 2019-11-20 RX ORDER — METOPROLOL SUCCINATE 25 MG/1
25 TABLET, EXTENDED RELEASE ORAL DAILY
Status: DISCONTINUED | OUTPATIENT
Start: 2019-11-20 | End: 2019-11-21

## 2019-11-20 RX ORDER — SODIUM CHLORIDE 9 MG/ML
INJECTION, SOLUTION INTRAVENOUS
Status: DISCONTINUED | OUTPATIENT
Start: 2019-11-21 | End: 2019-11-20 | Stop reason: HOSPADM

## 2019-11-20 RX ORDER — MIDAZOLAM HYDROCHLORIDE 1 MG/ML
INJECTION INTRAMUSCULAR; INTRAVENOUS
Status: COMPLETED
Start: 2019-11-20 | End: 2019-11-20

## 2019-11-20 RX ORDER — CLOPIDOGREL BISULFATE 75 MG/1
75 TABLET ORAL DAILY
Status: DISCONTINUED | OUTPATIENT
Start: 2019-11-20 | End: 2019-11-20

## 2019-11-20 RX ORDER — HEPARIN SODIUM 5000 [USP'U]/ML
INJECTION, SOLUTION INTRAVENOUS; SUBCUTANEOUS
Status: COMPLETED
Start: 2019-11-20 | End: 2019-11-20

## 2019-11-20 RX ORDER — ASPIRIN 81 MG/1
81 TABLET, CHEWABLE ORAL ONCE
Status: COMPLETED | OUTPATIENT
Start: 2019-11-20 | End: 2019-11-20

## 2019-11-20 RX ORDER — ATORVASTATIN CALCIUM 10 MG/1
10 TABLET, FILM COATED ORAL NIGHTLY
COMMUNITY

## 2019-11-20 RX ORDER — ISOSORBIDE DINITRATE 30 MG/1
30 TABLET ORAL DAILY
Status: ON HOLD | COMMUNITY
End: 2019-11-20

## 2019-11-20 RX ORDER — ASPIRIN 81 MG/1
81 TABLET ORAL DAILY
Status: DISCONTINUED | OUTPATIENT
Start: 2019-11-21 | End: 2019-11-21

## 2019-11-20 RX ADMIN — ASPIRIN 81 MG: 81 TABLET, CHEWABLE ORAL at 08:49:00

## 2019-11-20 RX ADMIN — ISOSORBIDE MONONITRATE 30 MG: 30 TABLET, EXTENDED RELEASE ORAL at 18:39:00

## 2019-11-20 RX ADMIN — ATORVASTATIN CALCIUM 10 MG: 10 TABLET, FILM COATED ORAL at 20:18:00

## 2019-11-20 RX ADMIN — METOPROLOL SUCCINATE 25 MG: 25 TABLET, EXTENDED RELEASE ORAL at 18:39:00

## 2019-11-20 NOTE — H&P
[address] [facility]    PCP: Margie Manriquez MD    Chief Complaint   Patient presents with   • Coronary Artery Disease   • Hyperlipidemia       HPI   This is a 25-year-old male with history of bypass surgery and subsequently has had Rotablator and stent 24 HR TABLET Take 1 tablet by mouth daily. METFORMIN (GLUCOPHAGE-XR) 500 MG 24 HR TABLET 1 tab 3x daily. METOPROLOL SUCCINATE (TOPROL-XL) 25 MG 24 HR TABLET 1 tablet daily.    NAPROXEN SODIUM (ALEVE) 220 MG TABLET as needed   OMEPRAZOLE (PRILOSEC) 20 MG of coronary artery bypass graft of native heart without angina pectoris   4. Hx of CABG   5. Pure hypercholesterolemia   6. Type 2 diabetes mellitus without complication, with long-term current use of insulin (CMS/[facility])   7.  Presence of drug coated stent in

## 2019-11-20 NOTE — PROGRESS NOTES
Pt s/p C with Dr. Jermaine Gordon. Pt recovered in holding. Right groin dressing cdi, no bleeding or hematoma. +pedals. Pt recovered for 2 hours. VSS. Pt ate lunch. After recovery pt up to bathroom with RN with no complaints. Right groin soft, dressing CDI.  Fami

## 2019-11-20 NOTE — PROGRESS NOTES
74 Y/o male with SOB, CABG, prior PCI, Dm    LV normal  % mid  %  RCA stents patent    LIMA patent  Sequential vein to diag, OM patent    Patient had normal CXR 2017 now abnormal with CT chest showing mass RLL with possible 2 lesions LLL.  Mario

## 2019-11-20 NOTE — CONSULTS
Zackery Garcia [address] [facility]  Pulmonary/Critical Care Consult Note  [facility]  Report of Consultation    [facility] Patient Status:  Outpatient in a Bed    2/3/1945 MRN [id number]   Location [facility] Other and unspecified hyperlipidemia    • Pancreatitis 1986   • Type II or unspecified type diabetes mellitus without mention of complication, not stated as uncontrolled    • Visual impairment      Past Surgical History:   Procedure Laterality Date   • ANG hematuria, urinary retention. Behavioral/Psych: Normal affect, mood, speech. No AVH, No SI/HI    All other review of systems are negative.     Vital signs in last 24 hours:  Patient Vitals for the past 24 hrs:   BP Temp Temp src Pulse Resp SpO2   11/20/19 10.5   .0     No results for input(s): BNP in the last 168 hours. No results for input(s): TROP, CK in the last 168 hours. No results for input(s): PT, INR, PTT in the last 168 hours.     Other Labs:     ABG:     No results for input(s): ABGPHT, AB sampling/biopsy for diagnosis. We reviewed options for biopsy which include bronchoscopy (which would include biopsy/EBUS bx of the RUL lung mass and navigational bronchoscopy at same time to biopsy the LLL nodules) or IR biopsy.  We reviewed the procedures

## 2019-11-21 VITALS
TEMPERATURE: 98 F | HEART RATE: 83 BPM | DIASTOLIC BLOOD PRESSURE: 54 MMHG | WEIGHT: 197.56 LBS | RESPIRATION RATE: 18 BRPM | SYSTOLIC BLOOD PRESSURE: 140 MMHG | BODY MASS INDEX: 28 KG/M2 | OXYGEN SATURATION: 94 %

## 2019-11-21 PROCEDURE — 99203 OFFICE O/P NEW LOW 30 MIN: CPT | Performed by: INTERNAL MEDICINE

## 2019-11-21 PROCEDURE — 99217 OBSERVATION CARE DISCHARGE: CPT | Performed by: INTERNAL MEDICINE

## 2019-11-21 RX ADMIN — PANTOPRAZOLE SODIUM 20 MG: 20 TABLET, DELAYED RELEASE ORAL at 08:55:00

## 2019-11-21 RX ADMIN — METOPROLOL SUCCINATE 25 MG: 25 TABLET, EXTENDED RELEASE ORAL at 08:55:00

## 2019-11-21 RX ADMIN — ASPIRIN 81 MG: 81 TABLET ORAL at 08:54:00

## 2019-11-21 RX ADMIN — ISOSORBIDE MONONITRATE 30 MG: 30 TABLET, EXTENDED RELEASE ORAL at 08:55:00

## 2019-11-21 NOTE — PLAN OF CARE
Rcv''d A/o/3  Denies pain or discomfort  On tele with SR   LHC today rt groin. Site soft dry and intact.  No oozing noted  Lung sounds clear bilateral  Consult for Oncology in am      Problem: CARDIOVASCULAR - ADULT  Goal: Absence of cardiac arrhythmias or

## 2019-11-21 NOTE — CONSULTS
Hematology-Oncology Consultation Note    Patient Name: Deborah Bro   YOB: 1945   Medical Record Number: [id number]   CSN: [id number]   Consulting Physician: Cony Anguiano MD  Date of Consultation: 11/21/2019     Reason for Consultation: History:   Procedure Laterality Date   • ANGIOGRAM     • ANGIOPLASTY (CORONARY)  3/7/2015    RCA   • APPENDECTOMY     • CABG  1999    x 4   • CATH DRUG ELUTING STENT     • HERNIA SURGERY     • OTHER SURGICAL HISTORY  2002    LeftTKR   • OTHER SURGICAL HIST violence:        Fear of current or ex partner: Not on file        Emotionally abused: Not on file        Physically abused: Not on file        Forced sexual activity: Not on file    Other Topics      Concerns:        Not on file    Social History Brian Gonsales 10 mg, Oral, Nightly  Isosorbide Mononitrate ER (IMDUR) 24 hr tab 30 mg, 30 mg, Oral, Daily        Review of Systems:  A 14-point ROS was done with pertinent positives and negative per the HPI    Vital Signs:  /54 (BP Location: Right arm)   Pulse 83 GLUCOSE 84 02/17/2011 0945    Glucose 127 (H) 11/21/2019 0548    Glucose 209 (H) 11/14/2019 0728    Glucose 131 (H) 11/16/2018 0919    BUN 16 11/21/2019 0548    BUN 25 (H) 11/14/2019 0728    BUN 22 (H) 11/16/2018 0919    BUN 20 09/21/2011 1220    BUN 24 (H 09/21/2011 1220    CHLORIDE 102 05/10/2011 0949    CHLORIDE 102 02/17/2011 0945    CO2 29.0 11/21/2019 0548    CO2 29.0 11/14/2019 0728    CO2 27.0 11/16/2018 0919    CO2 27.0 09/21/2011 1220    CO2 25.0 05/10/2011 0949    CO2 26.0 02/17/2011 0945    Alkal abnormal chest x-ray done today.       FINDINGS:    LUNGS:  There is a dominant spiculated mass along the right hilar tissues extending along the right major fissure, superior aspect of the right lower lobe and posterior segment of the right upper lobe tha There is a dominant spiculated mass along the right hilar tissues extending along the right major fissure, superior aspect of the right lower lobe and posterior segment of the right upper lobe measuring up to 4.8 x 3.9 x 3.0 cm.   The mass partially   encas scan of the chest is recommended. Report called by the radiology staff. Dictated by: Ewelina Clarke MD on 11/14/2019 at 8:45       Approved by: Ewelina Clarke MD on 11/14/2019 at 8:47         Impression & Plan:   1.  Bilateral lung masses- reviewed

## 2019-11-21 NOTE — PLAN OF CARE
Assumed patient care at 0730 this AM. Patient resting in bed A&Ox4, wife at bedside. SPO2 maintained >94% on RA, no c/o SOB. NSR on tele. Right groin site dressing c/d/I, no signs of bleeding or hematoma.  Patient denies pain at this time, will continue to ADULT  Goal: Incision(s), wounds(s) or drain site(s) healing without S/S of infection  Description  INTERVENTIONS:  - Assess and document risk factors for pressure ulcer development  - Assess and document skin integrity  - Assess and document dressing/inci

## 2019-11-21 NOTE — PROGRESS NOTES
[facility]  Cardiology Progress Note    Subjective:  No chest pain or shortness of breath. Family at bedside, supportive.      Objective:  /54 (BP Location: Right arm)   Pulse 83   Temp 97.9 °F (36.6 °C) (Oral)   Resp 18   Wt 197 lb 8.5 oz (89.6 independently.  Note reviewed and labs reviewed.  Agree with above assessment and plan. Cardiac status stable s/p LHC with Dr. Mindy Gonzalez yesterday showing diffusely diseased distal portion of a bypass that feeds a diagonal/OM brnach.  Limb from OM to PDA is

## 2019-11-21 NOTE — PLAN OF CARE
Received from cath lab holdign around 1515. Right groin site CDI, no evidence of hematoma, bleeding, or oozing. Remains in NSR, NL S1`, S2, RRR. Lungs clear on RA. Abdomen soft and non tender to touch with active bowel sounds in all four quadrants.   Ne

## 2019-11-21 NOTE — PROGRESS NOTES
Explained discharge instructions including medications and follow ups to the patient and his wife, verbalize understanding, PIV removed, tele monitor discontinued, will be transported via wheelchair to [employer].

## 2019-11-21 NOTE — PROGRESS NOTES
[facility] Pulmonary/Critical Care Progress Note     SUBJECTIVE/Interval history: All events, procedures, notes reviewed. No acute events, he feels \"fine' today. No complaints presently. No cough pain or dyspnea.  No f/ NEPRELIM, WBC, PLT in the last 168 hours. No results for input(s): BNP in the last 168 hours. No results for input(s): TROP, CK in the last 168 hours. No results for input(s): PT, INR, PTT in the last 168 hours.   No results for input(s): ABGPHT, ABGPCO2 metastatic disease is not excluded.    · CAD s/p CABG and PCI with multiple stents - last stent 10/2015  · former smoker (quit 2007, 50+ pack years),emphysema on CT chest  · DM  · HTN      PLAN     · Previously reviewed the imaging results, differential and

## 2019-11-22 ENCOUNTER — DOCUMENTATION (OUTPATIENT)
Dept: CARDIOLOGY | Age: 74
End: 2019-11-22

## 2019-11-22 ENCOUNTER — CLINICAL ABSTRACT (OUTPATIENT)
Dept: CARDIOLOGY | Age: 74
End: 2019-11-22

## 2019-11-22 ENCOUNTER — TELEPHONE (OUTPATIENT)
Dept: CARDIOLOGY | Age: 74
End: 2019-11-22

## 2019-12-03 ENCOUNTER — TELEPHONE (OUTPATIENT)
Dept: HEMATOLOGY/ONCOLOGY | Facility: HOSPITAL | Age: 74
End: 2019-12-03

## 2019-12-03 DIAGNOSIS — R91.8 LUNG MASS: Primary | ICD-10-CM

## 2019-12-03 DIAGNOSIS — K76.9 LESION OF LIVER: ICD-10-CM

## 2019-12-04 ENCOUNTER — TELEPHONE (OUTPATIENT)
Dept: HEMATOLOGY/ONCOLOGY | Facility: HOSPITAL | Age: 74
End: 2019-12-04

## 2019-12-04 ENCOUNTER — TELEPHONE (OUTPATIENT)
Dept: CARDIOLOGY | Age: 74
End: 2019-12-04

## 2019-12-04 RX ORDER — COVID-19 ANTIGEN TEST
220 KIT MISCELLANEOUS DAILY PRN
COMMUNITY

## 2019-12-04 NOTE — TELEPHONE ENCOUNTER
Jose D Lawrence with PET results and plan for biopsy of one of the liver lesions. Will need to hold ASA and Plavix prior and he will call his cardiologist that this is ok.  Biopsy scheduled through IR

## 2019-12-04 NOTE — TELEPHONE ENCOUNTER
Maria Lopez called to speak with somebody about getting a cardiac dena form sent to Dr. Deya Sutton office. She asked that she please get a call back to explain in more detail as to what needs to be done.  Please call back

## 2019-12-10 ENCOUNTER — HOSPITAL ENCOUNTER (OUTPATIENT)
Dept: ULTRASOUND IMAGING | Facility: HOSPITAL | Age: 74
Discharge: HOME | End: 2019-12-10
Attending: INTERNAL MEDICINE
Payer: MEDICARE

## 2019-12-10 ENCOUNTER — APPOINTMENT (OUTPATIENT)
Dept: LAB | Facility: HOSPITAL | Age: 74
End: 2019-12-10
Attending: RADIOLOGY
Payer: MEDICARE

## 2019-12-10 VITALS
WEIGHT: 190 LBS | HEIGHT: 70 IN | RESPIRATION RATE: 16 BRPM | BODY MASS INDEX: 27.2 KG/M2 | DIASTOLIC BLOOD PRESSURE: 79 MMHG | HEART RATE: 87 BPM | TEMPERATURE: 98 F | SYSTOLIC BLOOD PRESSURE: 124 MMHG | OXYGEN SATURATION: 95 %

## 2019-12-10 DIAGNOSIS — R91.8 LUNG MASS: ICD-10-CM

## 2019-12-10 DIAGNOSIS — K76.9 LESION OF LIVER: ICD-10-CM

## 2019-12-10 DIAGNOSIS — R91.8 LUNG MASS: Primary | ICD-10-CM

## 2019-12-10 DIAGNOSIS — C34.90 METASTATIC NON-SMALL CELL LUNG CANCER (HCC): ICD-10-CM

## 2019-12-10 PROCEDURE — 88360 TUMOR IMMUNOHISTOCHEM/MANUAL: CPT | Performed by: INTERNAL MEDICINE

## 2019-12-10 PROCEDURE — 88381 MICRODISSECTION MANUAL: CPT | Performed by: INTERNAL MEDICINE

## 2019-12-10 PROCEDURE — 88307 TISSUE EXAM BY PATHOLOGIST: CPT | Performed by: INTERNAL MEDICINE

## 2019-12-10 PROCEDURE — 99152 MOD SED SAME PHYS/QHP 5/>YRS: CPT | Performed by: INTERNAL MEDICINE

## 2019-12-10 PROCEDURE — 47000 NEEDLE BIOPSY OF LIVER PERQ: CPT | Performed by: INTERNAL MEDICINE

## 2019-12-10 PROCEDURE — 85610 PROTHROMBIN TIME: CPT

## 2019-12-10 PROCEDURE — 81235 EGFR GENE COM VARIANTS: CPT | Performed by: INTERNAL MEDICINE

## 2019-12-10 PROCEDURE — 88341 IMHCHEM/IMCYTCHM EA ADD ANTB: CPT | Performed by: INTERNAL MEDICINE

## 2019-12-10 PROCEDURE — 76942 ECHO GUIDE FOR BIOPSY: CPT | Performed by: INTERNAL MEDICINE

## 2019-12-10 PROCEDURE — 88342 IMHCHEM/IMCYTCHM 1ST ANTB: CPT | Performed by: INTERNAL MEDICINE

## 2019-12-10 PROCEDURE — 82962 GLUCOSE BLOOD TEST: CPT

## 2019-12-10 PROCEDURE — 99153 MOD SED SAME PHYS/QHP EA: CPT | Performed by: INTERNAL MEDICINE

## 2019-12-10 PROCEDURE — 81210 BRAF GENE: CPT | Performed by: INTERNAL MEDICINE

## 2019-12-10 PROCEDURE — 85027 COMPLETE CBC AUTOMATED: CPT

## 2019-12-10 PROCEDURE — 36415 COLL VENOUS BLD VENIPUNCTURE: CPT

## 2019-12-10 RX ORDER — FLUMAZENIL 0.1 MG/ML
INJECTION, SOLUTION INTRAVENOUS
Status: DISCONTINUED
Start: 2019-12-10 | End: 2019-12-10 | Stop reason: WASHOUT

## 2019-12-10 RX ORDER — MIDAZOLAM HYDROCHLORIDE 1 MG/ML
1 INJECTION INTRAMUSCULAR; INTRAVENOUS EVERY 5 MIN PRN
Status: DISCONTINUED | OUTPATIENT
Start: 2019-12-10 | End: 2019-12-12

## 2019-12-10 RX ORDER — MIDAZOLAM HYDROCHLORIDE 1 MG/ML
INJECTION INTRAMUSCULAR; INTRAVENOUS
Status: COMPLETED
Start: 2019-12-10 | End: 2019-12-10

## 2019-12-10 RX ORDER — SODIUM CHLORIDE 9 MG/ML
INJECTION, SOLUTION INTRAVENOUS CONTINUOUS
Status: DISCONTINUED | OUTPATIENT
Start: 2019-12-10 | End: 2019-12-12

## 2019-12-10 RX ORDER — NALOXONE HYDROCHLORIDE 0.4 MG/ML
80 INJECTION, SOLUTION INTRAMUSCULAR; INTRAVENOUS; SUBCUTANEOUS AS NEEDED
Status: DISCONTINUED | OUTPATIENT
Start: 2019-12-10 | End: 2019-12-12

## 2019-12-10 RX ORDER — FLUMAZENIL 0.1 MG/ML
0.2 INJECTION, SOLUTION INTRAVENOUS AS NEEDED
Status: DISCONTINUED | OUTPATIENT
Start: 2019-12-10 | End: 2019-12-12

## 2019-12-10 RX ORDER — NALOXONE HYDROCHLORIDE 0.4 MG/ML
INJECTION, SOLUTION INTRAMUSCULAR; INTRAVENOUS; SUBCUTANEOUS
Status: DISCONTINUED
Start: 2019-12-10 | End: 2019-12-10 | Stop reason: WASHOUT

## 2019-12-10 RX ADMIN — MIDAZOLAM HYDROCHLORIDE 1 MG: 1 INJECTION INTRAMUSCULAR; INTRAVENOUS at 11:46:00

## 2019-12-10 RX ADMIN — MIDAZOLAM HYDROCHLORIDE 0.5 MG: 1 INJECTION INTRAMUSCULAR; INTRAVENOUS at 12:02:00

## 2019-12-10 RX ADMIN — MIDAZOLAM HYDROCHLORIDE 0.5 MG: 1 INJECTION INTRAMUSCULAR; INTRAVENOUS at 11:52:00

## 2019-12-10 RX ADMIN — SODIUM CHLORIDE: 9 INJECTION, SOLUTION INTRAVENOUS at 11:10:00

## 2019-12-10 NOTE — IMAGING NOTE
Received pt to [email or web address], accompanied by wife, Mary Hussein [phone]. Pt here for liver biopsy. Pt states NPO since MN.   Pt states he has held his plavix, aspirin, and naproxen for past 6 days and held his diabetes meds this am. PT/INR, CBC, and accu chec

## 2019-12-10 NOTE — IMAGING NOTE
Pt positioned on back in [address]. Timeout per protocol. Pt tolerated procedure wll. Vital signs stable. Bacitracin/ tegaderm dressing to JOSE DAVID HOROWITZ. SBAR given to [employer]. Pt transported with wife and belongings via gurney to rm [id number].

## 2019-12-10 NOTE — PROCEDURES
2 PET pos liver lesions on outside study. The central lesion is not amenable to perc bx due to proximity to central vessels and bile ducts. Small lateral subcapsular lesion was sampled, 20G core x 6. Comp-none.

## 2019-12-13 ENCOUNTER — TELEPHONE (OUTPATIENT)
Dept: HEMATOLOGY/ONCOLOGY | Facility: HOSPITAL | Age: 74
End: 2019-12-13

## 2019-12-13 PROBLEM — C34.81 MALIGNANT NEOPLASM OF OVERLAPPING SITES OF RIGHT LUNG (HCC): Status: ACTIVE | Noted: 2019-12-13

## 2019-12-17 ENCOUNTER — OFFICE VISIT (OUTPATIENT)
Dept: HEMATOLOGY/ONCOLOGY | Age: 74
End: 2019-12-17
Attending: INTERNAL MEDICINE
Payer: MEDICARE

## 2019-12-17 VITALS
DIASTOLIC BLOOD PRESSURE: 70 MMHG | SYSTOLIC BLOOD PRESSURE: 134 MMHG | HEART RATE: 69 BPM | WEIGHT: 198.19 LBS | TEMPERATURE: 97 F | RESPIRATION RATE: 18 BRPM | OXYGEN SATURATION: 99 % | BODY MASS INDEX: 28 KG/M2

## 2019-12-17 DIAGNOSIS — C78.02 MALIGNANT NEOPLASM METASTATIC TO LEFT LUNG (HCC): ICD-10-CM

## 2019-12-17 DIAGNOSIS — C34.91 BRONCHOGENIC LUNG CANCER, RIGHT (HCC): Primary | ICD-10-CM

## 2019-12-17 DIAGNOSIS — R59.0 HILAR ADENOPATHY: ICD-10-CM

## 2019-12-17 DIAGNOSIS — C78.7 LIVER METASTASES (HCC): ICD-10-CM

## 2019-12-17 PROCEDURE — 99215 OFFICE O/P EST HI 40 MIN: CPT | Performed by: INTERNAL MEDICINE

## 2019-12-17 NOTE — PROGRESS NOTES
Cancer Center Progress Note    Patient Name: Danie Francis   YOB: 1945   Medical Record Number: [id number]   CSN: [id number]   Attending Physician: Sandra Sorto M.D.    Referring Physician: Morgan Hebert 5 days). 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 Lung mutation panel and PDL-1 testing still pending. Full path report below. MRI brain showed no evidence of metastases. Feels fair.  Appetite not as great but has Tissue     Antibody                          Result  CK7                                   See Comment  CK20                                 See Comment  CKAE1/3                          See Comment  TTF1                                 See Comment     CDX measuring 4.8 cm in greatest dimension. PET CT confirms a 3.3 cm right upper lobe lung mass as well as two hypermetabolic hepatic lesions suggestive of metastases.     Gross Description    Labeled with the patient's name and medical record number, right lo Disp: 60 each, Rfl: 1  •  Clopidogrel Bisulfate (PLAVIX) 75 MG Oral Tab, Take 1 tablet by mouth daily. , Disp: 90 tablet, Rfl: 4  •  Metoprolol Succinate ER (TOPROL XL) 25 MG Oral Tablet 24 Hr, Take 1 tablet by mouth daily. , Disp: 90 tablet, Rfl: 4  •  omep Non-medical: Not on file    Tobacco Use      Smoking status: Former Smoker        Packs/day: 2.00        Years: 50.00        Pack years: 80        Quit date: 3/5/2010        Years since quittin.7      Smokeless tobacco: Never Used    Substance and Sex edema. Neurological: Grossly intact.        Laboratory:  Lab Results   Component Value Date    WBC 12.3 (H) 12/10/2019    RBC 5.40 12/10/2019    HGB 15.1 12/10/2019    HCT 45.7 12/10/2019    MCV 84.6 12/10/2019    MCH 28.0 12/10/2019    MCHC 33.0 12/10/201 nodules in the medial and superior corner of the superior segment left  lower lobe, largest of which measures 2.0 x 1.2 cm and maximum SUV of 7.1. These lesions may be  metastases or represent additional primary bronchogenic neoplasm.   At least one enlarge calculus. DATE OF SERVICE: 12.11.2019     MRI BRAIN (W+WO) (CPT=70553)  CLINICAL INDICATION: 48-year-old male with a hypermetabolic lung mass.   COMPARISON: None  TECHNIQUE: Multiplanar, multisequence images of the brain were acquired using standard prot disease in which early progression might preclude the option of chemotherapy in the second line setting due to functional decline. In these patients I would favor a combination of doublet chemotherapy with immunotherapy.  In patients w/o a  mutation a

## 2019-12-17 NOTE — PROGRESS NOTES
Pt here for 1 month MD f/u. Energy level has been fair. Appetite has been less than normal. Denies pain. Pt has productive cough, has SOB with exertion. Pt has no further complaints.      Outpatient Oncology Care Plan  Problem list:  fatigue  knowledge def

## 2019-12-19 ENCOUNTER — TELEPHONE (OUTPATIENT)
Dept: HEMATOLOGY/ONCOLOGY | Facility: HOSPITAL | Age: 74
End: 2019-12-19

## 2019-12-19 NOTE — TELEPHONE ENCOUNTER
Called Maureen Slaughter- spoke to his wife. PDL-1 score high and plan IO alone. She verbalized understanding.

## 2019-12-23 ENCOUNTER — DIETICIAN VISIT (OUTPATIENT)
Dept: HEMATOLOGY/ONCOLOGY | Facility: HOSPITAL | Age: 74
End: 2019-12-23

## 2019-12-26 NOTE — PATIENT INSTRUCTIONS
Pembrolizumab injection  Brand Name: [employer] Sebastian  What is this medicine? PEMBROLIZUMAB (pem mike hollins mab) is a monoclonal antibody.  It is used to treat melanoma, head and neck cancer, Hodgkin lymphoma, lymphoma, non-small cell lung cancer, cervical canc · signs and symptoms of liver injury like dark urine, light-colored stools, loss of appetite, nausea, right upper belly pain, yellowing of the eyes or skin  · sweating  · swollen lymph nodes  · weight loss  Side effects that usually do not require medical Do not become pregnant while taking this medicine or for 4 months after stopping it. Women should inform their doctor if they wish to become pregnant or think they might be pregnant. There is a potential for serious side effects to an unborn child.  Talk to

## 2019-12-26 NOTE — PROCEDURES
[facility]    PATIENT'S NAME: Art Wheeler   ATTENDING PHYSICIAN: Yecenia Darling M.D. OPERATING PHYSICIAN: Yecenia Darling M.D.    PATIENT ACCOUNT#:   [de-identified]    LOCATION:  [address] [place] [address]  MEDICAL RECORD #:   [id number]       DATE OF BIRTH:

## 2019-12-27 ENCOUNTER — OFFICE VISIT (OUTPATIENT)
Dept: HEMATOLOGY/ONCOLOGY | Facility: HOSPITAL | Age: 74
End: 2019-12-27
Attending: INTERNAL MEDICINE
Payer: MEDICARE

## 2019-12-27 VITALS
BODY MASS INDEX: 28.15 KG/M2 | SYSTOLIC BLOOD PRESSURE: 131 MMHG | DIASTOLIC BLOOD PRESSURE: 73 MMHG | OXYGEN SATURATION: 95 % | HEART RATE: 78 BPM | RESPIRATION RATE: 18 BRPM | WEIGHT: 196.63 LBS | TEMPERATURE: 97 F | HEIGHT: 70 IN

## 2019-12-27 DIAGNOSIS — C34.81 MALIGNANT NEOPLASM OF OVERLAPPING SITES OF RIGHT LUNG (HCC): Primary | ICD-10-CM

## 2019-12-27 DIAGNOSIS — Z29.8 ENCOUNTER FOR IMMUNOTHERAPY: ICD-10-CM

## 2019-12-27 DIAGNOSIS — Z71.89 OTHER SPECIFIED COUNSELING: ICD-10-CM

## 2019-12-27 PROCEDURE — 99214 OFFICE O/P EST MOD 30 MIN: CPT | Performed by: CLINICAL NURSE SPECIALIST

## 2019-12-27 PROCEDURE — 96413 CHEMO IV INFUSION 1 HR: CPT

## 2019-12-27 NOTE — PROGRESS NOTES
Pt here for C 1 D 1 keytruda.   Arrives Ambulating independently, accompanied by Self, Spouse and Family member           Patient reports possible pregnancy since last therapy cycle: Not Applicable    Modifications in dose or schedule: No     Frequency of b

## 2019-12-27 NOTE — PROGRESS NOTES
Cancer Center Progress Note    Patient Name: Bolivar Barry   YOB: 1945   Medical Record Number: [id number]   CSN: [id number]   Date of visit: 12/27/2019  Provider: Tula Krabbe, APRN  Referring Physician: No ref.  provider found    Proble chest was done which showed a new mass (compared to CT in 2016) along the right hilar area extending along the right major fissure, superior aspect of the RLL and posterior segment of the RUL measuring 4.8 x 3.9 x 3 cm.  There were also additional new spicu 12/12/2019 at 1741      Final Diagnosis Comment    Immunostains performed show the tumor to be positive for Cytokeratin AE1/3,  P40, and focally for Cytokeratin 7; supporting the above diagnosis. A lung primary is favored.  The tumor is also weakly positiv (three) times daily. , Disp: 300 each, Rfl: 3  •  Insulin Degludec (TRESIBA FLEXTOUCH) 200 UNIT/ML Subcutaneous Solution Pen-injector, Inject 30 Units into the skin daily. (Patient taking differently: Inject into the skin daily.  28 units in the AM ), Disp: Calcium, Total 9.5   Calculated Osmolality 289   GFR, Non- 75   GFR, -American 87   AST 15   ALT 13 (L)   Alkaline Phosphatase 98   Bilirubin, Total 0.4   Total Protein 7.1   Albumin 3.1 (L)   Globulin  4.0   A/G Ratio 0.8 (L)   FA and caregiver were given ample opportunity to ask questions. All questions and concerns addressed. We discussed self care techniques, symptom management, fluid, diet, and activities.  Patient verbalized understanding of above information provided.      Immu

## 2019-12-27 NOTE — PROGRESS NOTES
Patient presents with: Follow - Up: apn assessment prior to treaatment  Pt Ed: immunotherapy education        C1D1 Dontae.Jose Armando- Patient here for labs and immunotherapy education prior to treatment.      Education Record    Learner:  Patient, Spouse and Family

## 2019-12-30 ENCOUNTER — TELEPHONE (OUTPATIENT)
Dept: HEMATOLOGY/ONCOLOGY | Facility: HOSPITAL | Age: 74
End: 2019-12-30

## 2019-12-30 ENCOUNTER — SOCIAL WORK SERVICES (OUTPATIENT)
Dept: HEMATOLOGY/ONCOLOGY | Facility: HOSPITAL | Age: 74
End: 2019-12-30

## 2019-12-30 NOTE — TELEPHONE ENCOUNTER
Date of Treatment: 12/27/19                                Chemo: Pembro    Comments: Spoke with wife. She states patient is feeling well after treatment, no issues.     Recommendations:   Reviewed with wife when to contact triage nurses during business jarek

## 2019-12-30 NOTE — PROGRESS NOTES
SW went to patient's room on Friday which was patients first day of chemotherapy at [place] [address] in [facility]. SW was going to explain our role as part of the patient's medical team and see if they needed any help with support, and/or resources.  They did not

## 2020-01-01 ENCOUNTER — EXTERNAL RECORD (OUTPATIENT)
Dept: HEALTH INFORMATION MANAGEMENT | Facility: OTHER | Age: 75
End: 2020-01-01

## 2020-01-02 NOTE — PROGRESS NOTES
Nutrition Consultation    Patient Name: Montana Fisher  YOB: 1945  Medical Record Number: [id number]   Account Number: [de-identified]  Dietitian: Nadir Joe RD, LDN      Date of visit: 12/27/2019    Diet Rx: high protein, CHO controlled mL, Rfl: 3  •  aspirin 81 MG Oral Tab, Take 81 mg by mouth daily. , Disp: , Rfl:   •  Insulin Pen Needle (NOVOFINE) 32G X 6 MM Does not apply Misc, 1 each by Other route 2 (two) times daily. , Disp: 60 each, Rfl: 1  •  Clopidogrel Bisulfate (PLAVIX) 75 MG Or referral.     Thank you for allowing me to participate in the care of  Jayashree Durantu.

## 2020-01-14 ENCOUNTER — TELEPHONE (OUTPATIENT)
Dept: HEMATOLOGY/ONCOLOGY | Facility: HOSPITAL | Age: 75
End: 2020-01-14

## 2020-01-14 NOTE — TELEPHONE ENCOUNTER
Tye Epstein is calling to discuss a Steroid medication, will it affect his immunotherapy on Friday and Derick Maravilla would like for the patient to have a glucose machine for 2 weeks.

## 2020-01-14 NOTE — TELEPHONE ENCOUNTER
Called pt back, pt had eye surgery and has some eyelid drooping, that MD would like him to try a steroid cream he puts in the eye. Pt unsure of what the medication is, pt will find it and bring to appt on Friday to discuss.  Pt will also have a glucose eleanor

## 2020-01-17 ENCOUNTER — OFFICE VISIT (OUTPATIENT)
Dept: HEMATOLOGY/ONCOLOGY | Facility: HOSPITAL | Age: 75
End: 2020-01-17
Attending: INTERNAL MEDICINE
Payer: MEDICARE

## 2020-01-17 VITALS
SYSTOLIC BLOOD PRESSURE: 149 MMHG | TEMPERATURE: 97 F | OXYGEN SATURATION: 97 % | RESPIRATION RATE: 18 BRPM | HEIGHT: 70 IN | DIASTOLIC BLOOD PRESSURE: 76 MMHG | BODY MASS INDEX: 29.2 KG/M2 | WEIGHT: 204 LBS | HEART RATE: 70 BPM

## 2020-01-17 DIAGNOSIS — C34.81 MALIGNANT NEOPLASM OF OVERLAPPING SITES OF RIGHT LUNG (HCC): ICD-10-CM

## 2020-01-17 DIAGNOSIS — C78.7 LIVER METASTASES (HCC): Primary | ICD-10-CM

## 2020-01-17 DIAGNOSIS — R59.0 HILAR ADENOPATHY: ICD-10-CM

## 2020-01-17 DIAGNOSIS — C34.81 MALIGNANT NEOPLASM OF OVERLAPPING SITES OF RIGHT LUNG (HCC): Primary | ICD-10-CM

## 2020-01-17 LAB
ALBUMIN SERPL-MCNC: 3.5 G/DL (ref 3.4–5)
ALBUMIN/GLOB SERPL: 1 {RATIO} (ref 1–2)
ALP LIVER SERPL-CCNC: 81 U/L (ref 45–117)
ALT SERPL-CCNC: 18 U/L (ref 16–61)
ANION GAP SERPL CALC-SCNC: 7 MMOL/L (ref 0–18)
AST SERPL-CCNC: 14 U/L (ref 15–37)
BASOPHILS # BLD AUTO: 0.03 X10(3) UL (ref 0–0.2)
BASOPHILS NFR BLD AUTO: 0.3 %
BILIRUB SERPL-MCNC: 0.4 MG/DL (ref 0.1–2)
BUN BLD-MCNC: 29 MG/DL (ref 7–18)
BUN/CREAT SERPL: 32.6 (ref 10–20)
CALCIUM BLD-MCNC: 9.5 MG/DL (ref 8.5–10.1)
CHLORIDE SERPL-SCNC: 104 MMOL/L (ref 98–112)
CO2 SERPL-SCNC: 25 MMOL/L (ref 21–32)
CREAT BLD-MCNC: 0.89 MG/DL (ref 0.7–1.3)
DEPRECATED RDW RBC AUTO: 42.5 FL (ref 35.1–46.3)
EOSINOPHIL # BLD AUTO: 0.15 X10(3) UL (ref 0–0.7)
EOSINOPHIL NFR BLD AUTO: 1.4 %
ERYTHROCYTE [DISTWIDTH] IN BLOOD BY AUTOMATED COUNT: 13.6 % (ref 11–15)
GLOBULIN PLAS-MCNC: 3.5 G/DL (ref 2.8–4.4)
GLUCOSE BLD-MCNC: 175 MG/DL (ref 70–99)
HCT VFR BLD AUTO: 43.9 % (ref 39–53)
HGB BLD-MCNC: 14.1 G/DL (ref 13–17.5)
IMM GRANULOCYTES # BLD AUTO: 0.03 X10(3) UL (ref 0–1)
IMM GRANULOCYTES NFR BLD: 0.3 %
LYMPHOCYTES # BLD AUTO: 4.28 X10(3) UL (ref 1–4)
LYMPHOCYTES NFR BLD AUTO: 39.1 %
M PROTEIN MFR SERPL ELPH: 7 G/DL (ref 6.4–8.2)
MCH RBC QN AUTO: 27.6 PG (ref 26–34)
MCHC RBC AUTO-ENTMCNC: 32.1 G/DL (ref 31–37)
MCV RBC AUTO: 86.1 FL (ref 80–100)
MONOCYTES # BLD AUTO: 0.43 X10(3) UL (ref 0.1–1)
MONOCYTES NFR BLD AUTO: 3.9 %
NEUTROPHILS # BLD AUTO: 6.04 X10 (3) UL (ref 1.5–7.7)
NEUTROPHILS # BLD AUTO: 6.04 X10(3) UL (ref 1.5–7.7)
NEUTROPHILS NFR BLD AUTO: 55 %
OSMOLALITY SERPL CALC.SUM OF ELEC: 292 MOSM/KG (ref 275–295)
PATIENT FASTING Y/N/NP: NO
PLATELET # BLD AUTO: 284 10(3)UL (ref 150–450)
POTASSIUM SERPL-SCNC: 4.2 MMOL/L (ref 3.5–5.1)
RBC # BLD AUTO: 5.1 X10(6)UL (ref 3.8–5.8)
SODIUM SERPL-SCNC: 136 MMOL/L (ref 136–145)
WBC # BLD AUTO: 11 X10(3) UL (ref 4–11)

## 2020-01-17 PROCEDURE — 96413 CHEMO IV INFUSION 1 HR: CPT

## 2020-01-17 PROCEDURE — 99215 OFFICE O/P EST HI 40 MIN: CPT | Performed by: INTERNAL MEDICINE

## 2020-01-17 NOTE — PROGRESS NOTES
Pt here for C 2 D 1 keytruda.   Arrives Ambulating independently, accompanied by Self, Spouse and Family member           Patient reports possible pregnancy since last therapy cycle: Not Applicable    Modifications in dose or schedule: No     Frequency of b

## 2020-01-17 NOTE — PROGRESS NOTES
Cancer Center Progress Note    Patient Name: Lindsay Cedeño   YOB: 1945   Medical Record Number: [id number]   CSN: [id number]   Attending Physician: Bora Hannah M.D.    [facility]      Date of Visit: 1/17/2020 working. Has cough and CHAHAL- not changed. Gaining weight and eating better. Denies chest or abdominal pain, change in his bowel or urinary habits, headaches, dizziness or visual symptoms. No hoarseness or hemoptysis.      Current Medications:    Current Out Osteoarthrosis, unspecified whether generalized or localized, unspecified site    • Other and unspecified hyperlipidemia    • Pancreatitis 1986   • [facility]    • Type II or unspecified type diabetes mellitus without mention of complication together: Not on file        Attends Anglican service: Not on file        Active member of club or organization: Not on file        Attends meetings of clubs or organizations: Not on file        Relationship status: Not on file      Intimate partner viole 12/27/2019    ALT 13 (L) 12/27/2019    AST 15 12/27/2019    BILT 0.4 12/27/2019    ALB 3.1 (L) 12/27/2019    TP 7.1 12/27/2019     Lab Component   Component Value Date/Time    CEA 3.0 12/27/2019 1111     Lab Component   Component Value Date/Time     are 2 hypermetabolic lesions in the liver, one located in the central aspect of the medial  segment of the right lobe with approximate dimensions of 1.4 x 1.3 cm and calculated SUV of 9.5.  The  second lesion is in subcapsular aspect of the lateral corner o scattered, punctate foci of T2  prolongation in the subcortical white matter of the bilateral cerebral hemispheres. No mass effect  or edema is seen. There is no abnormal parenchymal enhancement.  There is an old lacunar infarction  in the inferior right ce and currently there are no active problems.     Lou Wolfe MD  [address] [facility]

## 2020-02-03 RX ORDER — METOPROLOL SUCCINATE 25 MG/1
TABLET, EXTENDED RELEASE ORAL
Qty: 90 TABLET | Refills: 3 | Status: SHIPPED | OUTPATIENT
Start: 2020-02-03 | End: 2020-10-29

## 2020-02-04 ENCOUNTER — HOSPITAL ENCOUNTER (OUTPATIENT)
Dept: CT IMAGING | Facility: HOSPITAL | Age: 75
Discharge: HOME | End: 2020-02-04
Attending: INTERNAL MEDICINE
Payer: MEDICARE

## 2020-02-04 DIAGNOSIS — C34.81 MALIGNANT NEOPLASM OF OVERLAPPING SITES OF RIGHT LUNG (HCC): ICD-10-CM

## 2020-02-04 PROCEDURE — 74160 CT ABDOMEN W/CONTRAST: CPT | Performed by: INTERNAL MEDICINE

## 2020-02-04 PROCEDURE — 71260 CT THORAX DX C+: CPT | Performed by: INTERNAL MEDICINE

## 2020-02-06 ENCOUNTER — OFFICE VISIT (OUTPATIENT)
Dept: HEMATOLOGY/ONCOLOGY | Facility: HOSPITAL | Age: 75
End: 2020-02-06
Attending: INTERNAL MEDICINE
Payer: MEDICARE

## 2020-02-06 VITALS
WEIGHT: 207 LBS | HEIGHT: 70 IN | SYSTOLIC BLOOD PRESSURE: 144 MMHG | BODY MASS INDEX: 29.63 KG/M2 | RESPIRATION RATE: 16 BRPM | OXYGEN SATURATION: 96 % | TEMPERATURE: 97 F | HEART RATE: 54 BPM | DIASTOLIC BLOOD PRESSURE: 67 MMHG

## 2020-02-06 DIAGNOSIS — C78.7 LIVER METASTASES (HCC): Primary | ICD-10-CM

## 2020-02-06 DIAGNOSIS — C34.81 MALIGNANT NEOPLASM OF OVERLAPPING SITES OF RIGHT LUNG (HCC): ICD-10-CM

## 2020-02-06 DIAGNOSIS — C34.81 MALIGNANT NEOPLASM OF OVERLAPPING SITES OF RIGHT LUNG (HCC): Primary | ICD-10-CM

## 2020-02-06 DIAGNOSIS — R59.0 HILAR ADENOPATHY: ICD-10-CM

## 2020-02-06 LAB
ALBUMIN SERPL-MCNC: 3.5 G/DL (ref 3.4–5)
ALBUMIN/GLOB SERPL: 0.9 {RATIO} (ref 1–2)
ALP LIVER SERPL-CCNC: 83 U/L (ref 45–117)
ALT SERPL-CCNC: 25 U/L (ref 16–61)
ANION GAP SERPL CALC-SCNC: 4 MMOL/L (ref 0–18)
AST SERPL-CCNC: 17 U/L (ref 15–37)
BASOPHILS # BLD AUTO: 0.05 X10(3) UL (ref 0–0.2)
BASOPHILS NFR BLD AUTO: 0.5 %
BILIRUB SERPL-MCNC: 0.5 MG/DL (ref 0.1–2)
BUN BLD-MCNC: 26 MG/DL (ref 7–18)
BUN/CREAT SERPL: 26.5 (ref 10–20)
CALCIUM BLD-MCNC: 9.4 MG/DL (ref 8.5–10.1)
CEA SERPL-MCNC: 3.3 NG/ML (ref ?–5)
CHLORIDE SERPL-SCNC: 106 MMOL/L (ref 98–112)
CO2 SERPL-SCNC: 25 MMOL/L (ref 21–32)
CREAT BLD-MCNC: 0.98 MG/DL (ref 0.7–1.3)
DEPRECATED RDW RBC AUTO: 45.8 FL (ref 35.1–46.3)
EOSINOPHIL # BLD AUTO: 0.27 X10(3) UL (ref 0–0.7)
EOSINOPHIL NFR BLD AUTO: 2.8 %
ERYTHROCYTE [DISTWIDTH] IN BLOOD BY AUTOMATED COUNT: 14.5 % (ref 11–15)
GLOBULIN PLAS-MCNC: 4 G/DL (ref 2.8–4.4)
GLUCOSE BLD-MCNC: 188 MG/DL (ref 70–99)
HCT VFR BLD AUTO: 45.4 % (ref 39–53)
HGB BLD-MCNC: 14.9 G/DL (ref 13–17.5)
IMM GRANULOCYTES # BLD AUTO: 0.02 X10(3) UL (ref 0–1)
IMM GRANULOCYTES NFR BLD: 0.2 %
LDH SERPL L TO P-CCNC: 190 U/L (ref 87–241)
LYMPHOCYTES # BLD AUTO: 4.4 X10(3) UL (ref 1–4)
LYMPHOCYTES NFR BLD AUTO: 45.3 %
M PROTEIN MFR SERPL ELPH: 7.5 G/DL (ref 6.4–8.2)
MCH RBC QN AUTO: 28.3 PG (ref 26–34)
MCHC RBC AUTO-ENTMCNC: 32.8 G/DL (ref 31–37)
MCV RBC AUTO: 86.1 FL (ref 80–100)
MONOCYTES # BLD AUTO: 0.5 X10(3) UL (ref 0.1–1)
MONOCYTES NFR BLD AUTO: 5.1 %
NEUTROPHILS # BLD AUTO: 4.47 X10 (3) UL (ref 1.5–7.7)
NEUTROPHILS # BLD AUTO: 4.47 X10(3) UL (ref 1.5–7.7)
NEUTROPHILS NFR BLD AUTO: 46.1 %
OSMOLALITY SERPL CALC.SUM OF ELEC: 290 MOSM/KG (ref 275–295)
PATIENT FASTING Y/N/NP: NO
PLATELET # BLD AUTO: 220 10(3)UL (ref 150–450)
POTASSIUM SERPL-SCNC: 4.2 MMOL/L (ref 3.5–5.1)
RBC # BLD AUTO: 5.27 X10(6)UL (ref 3.8–5.8)
SODIUM SERPL-SCNC: 135 MMOL/L (ref 136–145)
TSI SER-ACNC: 1.29 MIU/ML (ref 0.36–3.74)
WBC # BLD AUTO: 9.7 X10(3) UL (ref 4–11)

## 2020-02-06 PROCEDURE — 96413 CHEMO IV INFUSION 1 HR: CPT

## 2020-02-06 PROCEDURE — 99215 OFFICE O/P EST HI 40 MIN: CPT | Performed by: INTERNAL MEDICINE

## 2020-02-06 NOTE — PROGRESS NOTES
Cancer Center Progress Note    Patient Name: Joseph Sears   YOB: 1945   Medical Record Number: [id number]   CSN: [id number]   Attending Physician: Theresa Rai M.D.    Referring Physician: DR Jasmyn Cho      Date of V habits, headaches, dizziness or visual symptoms. No hoarseness or hemoptysis. Appetite good. Has been gaining weight. Current Medications:    Current Outpatient Medications:   •  Umeclidinium-Vilanterol (ANORO ELLIPTA IN), Inhale into the lungs. , Dis Esophageal reflux    • Hearing impairment    • High blood pressure    • High cholesterol    • Osteoarthrosis, unspecified whether generalized or localized, unspecified site    • Other and unspecified hyperlipidemia    • Pancreatitis 1986   • Pulmonary emph file    Relationships      Social connections:        Talks on phone: Not on file        Gets together: Not on file        Attends Hindu service: Not on file        Active member of club or organization: Not on file        Attends meetings of clubs or 02/06/2020     (H) 02/06/2020    CA 9.4 02/06/2020    ALKPHO 83 02/06/2020    ALT 25 02/06/2020    AST 17 02/06/2020    BILT 0.5 02/06/2020    ALB 3.5 02/06/2020    TP 7.5 02/06/2020     Lab Component   Component Value Date/Time    CEA 3.3 02/06/202 1.6 x 2.0 cm, overall stable from the prior exam.  CARDIAC:  No enlargement, pericardial thickening, or significant calcification. PLEURA:  No mass or effusion. CHEST WALL:  No mass or axillary adenopathy. AORTA:  No aneurysm or dissection.     VASCU hepatis, however this is unchanged dating back to 2014. There is increased prominence of the proximal aspect of the pancreatic duct measuring up to 6 mm. This is also unchanged dating back to the exam of 2014.  And postsurgical changes are noted in t accumulation be of 14.4, consistent with a metastatic lymph node. No hypermetabolic lymph nodes are found in the mediastinum, left hilum, or axillary regions  bilaterally.   ABDOMEN AND PELVIS:  There are 2 hypermetabolic lesions in the liver, one located intravenous infusion  of Gadavist.  Vial Size: 10 mL  Injected Amount: 8.5 mL  Discarded Amount: 1.5 mL  ADVERSE REACTION: None. FINDINGS:  Brain: There is no diffusion restriction.  There are a few, scattered, punctate foci of T2  prolongation in the subc

## 2020-02-06 NOTE — PROGRESS NOTES
Pt here for C3D1.   Arrives Ambulating independently, accompanied by Self and Spouse           Patient reports possible pregnancy since last therapy cycle: No    Modifications in dose or schedule: No     Frequency of blood return and site check throughout a

## 2020-02-27 ENCOUNTER — OFFICE VISIT (OUTPATIENT)
Dept: HEMATOLOGY/ONCOLOGY | Facility: HOSPITAL | Age: 75
End: 2020-02-27
Attending: INTERNAL MEDICINE
Payer: MEDICARE

## 2020-02-27 ENCOUNTER — HOSPITAL ENCOUNTER (OUTPATIENT)
Dept: CT IMAGING | Facility: HOSPITAL | Age: 75
Discharge: HOME | End: 2020-02-27
Attending: INTERNAL MEDICINE
Payer: MEDICARE

## 2020-02-27 VITALS
HEIGHT: 70 IN | WEIGHT: 209.19 LBS | TEMPERATURE: 97 F | RESPIRATION RATE: 16 BRPM | SYSTOLIC BLOOD PRESSURE: 130 MMHG | DIASTOLIC BLOOD PRESSURE: 67 MMHG | OXYGEN SATURATION: 96 % | BODY MASS INDEX: 29.95 KG/M2 | HEART RATE: 62 BPM

## 2020-02-27 DIAGNOSIS — R06.02 SHORTNESS OF BREATH: ICD-10-CM

## 2020-02-27 DIAGNOSIS — C34.81 MALIGNANT NEOPLASM OF OVERLAPPING SITES OF RIGHT LUNG (HCC): Primary | ICD-10-CM

## 2020-02-27 DIAGNOSIS — C78.7 LIVER METASTASES (HCC): ICD-10-CM

## 2020-02-27 DIAGNOSIS — R59.0 HILAR ADENOPATHY: ICD-10-CM

## 2020-02-27 DIAGNOSIS — C34.81 MALIGNANT NEOPLASM OF OVERLAPPING SITES OF RIGHT LUNG (HCC): ICD-10-CM

## 2020-02-27 LAB
ALBUMIN SERPL-MCNC: 3.6 G/DL (ref 3.4–5)
ALBUMIN/GLOB SERPL: 1.1 {RATIO} (ref 1–2)
ALP LIVER SERPL-CCNC: 79 U/L (ref 45–117)
ALT SERPL-CCNC: 25 U/L (ref 16–61)
ANION GAP SERPL CALC-SCNC: 7 MMOL/L (ref 0–18)
AST SERPL-CCNC: 17 U/L (ref 15–37)
BASOPHILS # BLD AUTO: 0.04 X10(3) UL (ref 0–0.2)
BASOPHILS NFR BLD AUTO: 0.5 %
BILIRUB SERPL-MCNC: 0.4 MG/DL (ref 0.1–2)
BUN BLD-MCNC: 28 MG/DL (ref 7–18)
BUN/CREAT SERPL: 25.9 (ref 10–20)
CALCIUM BLD-MCNC: 9.1 MG/DL (ref 8.5–10.1)
CHLORIDE SERPL-SCNC: 108 MMOL/L (ref 98–112)
CO2 SERPL-SCNC: 22 MMOL/L (ref 21–32)
CREAT BLD-MCNC: 1.08 MG/DL (ref 0.7–1.3)
DEPRECATED RDW RBC AUTO: 50.4 FL (ref 35.1–46.3)
EOSINOPHIL # BLD AUTO: 0.15 X10(3) UL (ref 0–0.7)
EOSINOPHIL NFR BLD AUTO: 1.8 %
ERYTHROCYTE [DISTWIDTH] IN BLOOD BY AUTOMATED COUNT: 15.4 % (ref 11–15)
GLOBULIN PLAS-MCNC: 3.4 G/DL (ref 2.8–4.4)
GLUCOSE BLD-MCNC: 167 MG/DL (ref 70–99)
HCT VFR BLD AUTO: 45.7 % (ref 39–53)
HGB BLD-MCNC: 14.7 G/DL (ref 13–17.5)
IMM GRANULOCYTES # BLD AUTO: 0.02 X10(3) UL (ref 0–1)
IMM GRANULOCYTES NFR BLD: 0.2 %
LYMPHOCYTES # BLD AUTO: 3.9 X10(3) UL (ref 1–4)
LYMPHOCYTES NFR BLD AUTO: 47.4 %
M PROTEIN MFR SERPL ELPH: 7 G/DL (ref 6.4–8.2)
MCH RBC QN AUTO: 28.8 PG (ref 26–34)
MCHC RBC AUTO-ENTMCNC: 32.2 G/DL (ref 31–37)
MCV RBC AUTO: 89.4 FL (ref 80–100)
MONOCYTES # BLD AUTO: 0.43 X10(3) UL (ref 0.1–1)
MONOCYTES NFR BLD AUTO: 5.2 %
NEUTROPHILS # BLD AUTO: 3.68 X10 (3) UL (ref 1.5–7.7)
NEUTROPHILS # BLD AUTO: 3.68 X10(3) UL (ref 1.5–7.7)
NEUTROPHILS NFR BLD AUTO: 44.9 %
OSMOLALITY SERPL CALC.SUM OF ELEC: 293 MOSM/KG (ref 275–295)
PATIENT FASTING Y/N/NP: NO
PLATELET # BLD AUTO: 227 10(3)UL (ref 150–450)
POTASSIUM SERPL-SCNC: 4.3 MMOL/L (ref 3.5–5.1)
RBC # BLD AUTO: 5.11 X10(6)UL (ref 3.8–5.8)
SODIUM SERPL-SCNC: 137 MMOL/L (ref 136–145)
WBC # BLD AUTO: 8.2 X10(3) UL (ref 4–11)

## 2020-02-27 PROCEDURE — 71275 CT ANGIOGRAPHY CHEST: CPT | Performed by: INTERNAL MEDICINE

## 2020-02-27 PROCEDURE — 99215 OFFICE O/P EST HI 40 MIN: CPT | Performed by: INTERNAL MEDICINE

## 2020-02-27 PROCEDURE — 96413 CHEMO IV INFUSION 1 HR: CPT

## 2020-02-27 NOTE — PROGRESS NOTES
Pt here for C4.   Arrives Ambulating independently, accompanied by Spouse           Patient reports possible pregnancy since last therapy cycle: Not Applicable    Modifications in dose or schedule: No     Frequency of blood return and site check throughout

## 2020-02-28 NOTE — PROGRESS NOTES
Cancer Center Progress Note    Patient Name: Magali Paul   YOB: 1945   Medical Record Number: [id number]   CSN: [id number]   Attending Physician: Tana Wu M.D.    Referring Physician: Xochitl Hankins Feels SOB even at rest. No fevers or recent sick contacts. Denies chest or abdominal pain, change in his bowel or urinary habits, headaches, dizziness or visual symptoms. No hoarseness or hemoptysis. Appetite great!  Has gained about 19 pounds since his di every morning., Disp: , Rfl:     Past Medical History:  Past Medical History:   Diagnosis Date   • Atherosclerosis of coronary artery    • Cancer (HCC)    • Esophageal reflux    • Hearing impairment    • High blood pressure    • High cholesterol    • Osteo Sexual activity: Not on file    Lifestyle      Physical activity:        Days per week: Not on file        Minutes per session: Not on file      Stress: Not on file    Relationships      Social connections:        Talks on phone: Not on file        Gets to 227.0 02/27/2020     Lab Results   Component Value Date     02/27/2020    K 4.3 02/27/2020     02/27/2020    CO2 22.0 02/27/2020    BUN 28 (H) 02/27/2020    CREATSERUM 1.08 02/27/2020     (H) 02/27/2020    CA 9.1 02/27/2020    ALKPHO 79 mediastinum. These have an overall stable appearance when compared to the prior exam.  KT:  As above.   One additional nodular area of the right hilum with partial calcification measures up to 1.6 x 2.0 cm, overall stable from the prior exam.  CARDIAC: size of lesions present within the right hilum and left lower lobe region. Continued follow-up is suggested. There is a mildly enlarged retroperitoneal lymph node in the region of the danis hepatis, however this is unchanged dating back to 2014. These lesions may be  metastases or represent additional primary bronchogenic neoplasm.   At least one enlarged right hilar lymph node is present which measures 2.3 x 2.1 cm with significant  FDG accumulation be of 14.4, consistent with a metastatic lymph n None  TECHNIQUE: Multiplanar, multisequence images of the brain were acquired using standard protocol on a  1.5 Minda magnet.  Contrast enhanced images of the brain were acquired after the intravenous infusion  of Gadavist.  Vial Size: 10 mL  Injected Amoun controlled    6. DM- followed by Endo. Labs reviewed. Should still be able to proceed with [facility] today. CTA chest as ordered. RTC 3 weeks- sooner pending CT results      Risk level: High- metastatic lung cancer on IO.      [employer] Carlo Patel

## 2020-03-06 RX ORDER — CLOPIDOGREL BISULFATE 75 MG/1
TABLET ORAL
Qty: 90 TABLET | Refills: 3 | Status: SHIPPED | OUTPATIENT
Start: 2020-03-06 | End: 2021-01-15

## 2020-03-06 RX ORDER — ATORVASTATIN CALCIUM 10 MG/1
TABLET, FILM COATED ORAL
Qty: 90 TABLET | Refills: 0 | Status: SHIPPED | OUTPATIENT
Start: 2020-03-06 | End: 2020-06-08

## 2020-03-19 ENCOUNTER — OFFICE VISIT (OUTPATIENT)
Dept: HEMATOLOGY/ONCOLOGY | Facility: HOSPITAL | Age: 75
End: 2020-03-19
Attending: INTERNAL MEDICINE
Payer: MEDICARE

## 2020-03-19 VITALS
HEART RATE: 63 BPM | TEMPERATURE: 97 F | SYSTOLIC BLOOD PRESSURE: 144 MMHG | HEIGHT: 70 IN | BODY MASS INDEX: 29.78 KG/M2 | OXYGEN SATURATION: 98 % | DIASTOLIC BLOOD PRESSURE: 77 MMHG | RESPIRATION RATE: 16 BRPM | WEIGHT: 208 LBS

## 2020-03-19 DIAGNOSIS — C34.81 MALIGNANT NEOPLASM OF OVERLAPPING SITES OF RIGHT LUNG (HCC): Primary | ICD-10-CM

## 2020-03-19 DIAGNOSIS — C78.7 LIVER METASTASES (HCC): ICD-10-CM

## 2020-03-19 DIAGNOSIS — R59.0 HILAR ADENOPATHY: ICD-10-CM

## 2020-03-19 DIAGNOSIS — R06.02 SHORTNESS OF BREATH: ICD-10-CM

## 2020-03-19 LAB
ALBUMIN SERPL-MCNC: 3.6 G/DL (ref 3.4–5)
ALBUMIN/GLOB SERPL: 0.9 {RATIO} (ref 1–2)
ALP LIVER SERPL-CCNC: 83 U/L (ref 45–117)
ALT SERPL-CCNC: 22 U/L (ref 16–61)
ANION GAP SERPL CALC-SCNC: 7 MMOL/L (ref 0–18)
AST SERPL-CCNC: 15 U/L (ref 15–37)
BASOPHILS # BLD AUTO: 0.04 X10(3) UL (ref 0–0.2)
BASOPHILS NFR BLD AUTO: 0.4 %
BILIRUB SERPL-MCNC: 0.4 MG/DL (ref 0.1–2)
BUN BLD-MCNC: 29 MG/DL (ref 7–18)
BUN/CREAT SERPL: 25.9 (ref 10–20)
CALCIUM BLD-MCNC: 9.3 MG/DL (ref 8.5–10.1)
CEA SERPL-MCNC: 2.8 NG/ML (ref ?–5)
CHLORIDE SERPL-SCNC: 105 MMOL/L (ref 98–112)
CO2 SERPL-SCNC: 22 MMOL/L (ref 21–32)
CREAT BLD-MCNC: 1.12 MG/DL (ref 0.7–1.3)
DEPRECATED RDW RBC AUTO: 49.1 FL (ref 35.1–46.3)
EOSINOPHIL # BLD AUTO: 0.12 X10(3) UL (ref 0–0.7)
EOSINOPHIL NFR BLD AUTO: 1.2 %
ERYTHROCYTE [DISTWIDTH] IN BLOOD BY AUTOMATED COUNT: 15.1 % (ref 11–15)
GLOBULIN PLAS-MCNC: 3.9 G/DL (ref 2.8–4.4)
GLUCOSE BLD-MCNC: 254 MG/DL (ref 70–99)
HCT VFR BLD AUTO: 48.1 % (ref 39–53)
HGB BLD-MCNC: 15.6 G/DL (ref 13–17.5)
IMM GRANULOCYTES # BLD AUTO: 0.02 X10(3) UL (ref 0–1)
IMM GRANULOCYTES NFR BLD: 0.2 %
LDH SERPL L TO P-CCNC: 180 U/L
LYMPHOCYTES # BLD AUTO: 4.62 X10(3) UL (ref 1–4)
LYMPHOCYTES NFR BLD AUTO: 44.5 %
M PROTEIN MFR SERPL ELPH: 7.5 G/DL (ref 6.4–8.2)
MCH RBC QN AUTO: 28.7 PG (ref 26–34)
MCHC RBC AUTO-ENTMCNC: 32.4 G/DL (ref 31–37)
MCV RBC AUTO: 88.6 FL (ref 80–100)
MONOCYTES # BLD AUTO: 0.53 X10(3) UL (ref 0.1–1)
MONOCYTES NFR BLD AUTO: 5.1 %
NEUTROPHILS # BLD AUTO: 5.06 X10 (3) UL (ref 1.5–7.7)
NEUTROPHILS # BLD AUTO: 5.06 X10(3) UL (ref 1.5–7.7)
NEUTROPHILS NFR BLD AUTO: 48.6 %
OSMOLALITY SERPL CALC.SUM OF ELEC: 292 MOSM/KG (ref 275–295)
PLATELET # BLD AUTO: 247 10(3)UL (ref 150–450)
POTASSIUM SERPL-SCNC: 4.7 MMOL/L (ref 3.5–5.1)
RBC # BLD AUTO: 5.43 X10(6)UL (ref 3.8–5.8)
SODIUM SERPL-SCNC: 134 MMOL/L (ref 136–145)
TSI SER-ACNC: 0.49 MIU/ML (ref 0.36–3.74)
WBC # BLD AUTO: 10.4 X10(3) UL (ref 4–11)

## 2020-03-19 PROCEDURE — 99215 OFFICE O/P EST HI 40 MIN: CPT | Performed by: INTERNAL MEDICINE

## 2020-03-19 PROCEDURE — 96413 CHEMO IV INFUSION 1 HR: CPT

## 2020-03-19 NOTE — PROGRESS NOTES
Cancer Center Progress Note    Patient Name: Dayton All   YOB: 1945   Medical Record Number: [id number]   CSN: [id number]   Attending Physician: Berna Ríos M.D.    Referring Physician: Cecy Peacock Cough is practically gone and SOB better. Still has some CHAHAL but able to tolerate more activity. No fevers or recent sick contacts. Denies chest or abdominal pain, change in his bowel or urinary habits, headaches, dizziness or visual symptoms.  No hoarsene Delayed Release, Take 20 mg by mouth every morning., Disp: , Rfl:     Past Medical History:  Past Medical History:   Diagnosis Date   • Atherosclerosis of coronary artery    • Cancer [facility])    • Esophageal reflux    • Hearing impairment    • High blood pres Currently      Drug use: No      Sexual activity: Not on file    Lifestyle      Physical activity:        Days per week: Not on file        Minutes per session: Not on file      Stress: Not on file    Relationships      Social connections:        Talks on  87 - 241 U/L   COMP METABOLIC PANEL (14)    Collection Time: 03/19/20 12:38 PM   Result Value Ref Range    Glucose 254 (H) 70 - 99 mg/dL    Sodium 134 (L) 136 - 145 mmol/L    Potassium 4.7 3.5 - 5.1 mmol/L    Chloride 105 98 - 112 mmol/L    CO2 22. COMPARISON:  [facility] , CT, CT CHEST+ABDOMEN (ALL CNTRST ONLY) (JEP=95417/15874), 2/04/2020, 10:24.      INDICATIONS:  C34.81 Malignant neoplasm of overlapping sites of right bronchus and lung     TECHNIQUE:  IV contrast-enhanced multislice CT angiography i Dictated by: Gogo Mancera MD on 2/27/2020 at 15:47       Approved by: Gogo Mancera MD on 2/27/2020 at 15:54         PROCEDURE:  CT CHEST+ABDOMEN (ALL CNTRST ONLY) (CPT=71260/94503)     COMPARISON:  [facility] CT, CT CHEST (CPT=71250), 11/14/2019, 15:55 or dissection. VASCULATURE:  No visible pulmonary arterial thrombus or attenuation. ABDOMEN:  LIVER:  Scattered calcified granulomas noted throughout the liver compatible with prior granulomatous disease.   BILIARY:  No visible dilatation or calcif changes are noted in this area. Correlation with the history is suggested. MRCP   or ERCP could be performed if clinically indicated.           Dictated by: Arturo Reinoso MD on 2/04/2020 at 10:48       Approved by: Arturo Reinoso MD on 2/04/2020 at 11:00 the liver, one located in the central aspect of the medial  segment of the right lobe with approximate dimensions of 1.4 x 1.3 cm and calculated SUV of 9.5.  The  second lesion is in subcapsular aspect of the lateral corner of the right lobe, at the [facility] T2  prolongation in the subcortical white matter of the bilateral cerebral hemispheres. No mass effect  or edema is seen. There is no abnormal parenchymal enhancement. There is an old lacunar infarction  in the inferior right cerebellar hemisphere.   [facility]

## 2020-03-19 NOTE — PROGRESS NOTES
Pt here for C5D1 of Keytruda.   Arrives Ambulating independently, accompanied by Self           Patient reports possible pregnancy since last therapy cycle: No    Modifications in dose or schedule: No     Frequency of blood return and site check throughout

## 2020-03-25 RX ORDER — ATORVASTATIN CALCIUM 10 MG/1
TABLET, FILM COATED ORAL
Qty: 90 TABLET | Refills: 0 | OUTPATIENT
Start: 2020-03-25

## 2020-03-27 RX ORDER — UMECLIDINIUM BROMIDE AND VILANTEROL TRIFENATATE 62.5; 25 UG/1; UG/1
POWDER RESPIRATORY (INHALATION)
COMMUNITY
Start: 2020-03-17 | End: 2020-12-07 | Stop reason: ALTCHOICE

## 2020-03-27 RX ORDER — EMPAGLIFLOZIN 10 MG/1
10 TABLET, FILM COATED ORAL DAILY
COMMUNITY
Start: 2020-03-17 | End: 2021-08-16

## 2020-03-30 ENCOUNTER — OFFICE VISIT (OUTPATIENT)
Dept: CARDIOLOGY | Age: 75
End: 2020-03-30

## 2020-03-30 VITALS — HEIGHT: 70 IN | WEIGHT: 205 LBS | BODY MASS INDEX: 29.35 KG/M2

## 2020-03-30 DIAGNOSIS — I25.810 ATHEROSCLEROSIS OF CORONARY ARTERY BYPASS GRAFT OF NATIVE HEART WITHOUT ANGINA PECTORIS: ICD-10-CM

## 2020-03-30 DIAGNOSIS — Z95.1 HX OF CABG: ICD-10-CM

## 2020-03-30 DIAGNOSIS — I25.10 CORONARY ARTERY DISEASE INVOLVING NATIVE CORONARY ARTERY OF NATIVE HEART WITHOUT ANGINA PECTORIS: Primary | ICD-10-CM

## 2020-03-30 PROCEDURE — 99441 TELEPHONE E&M BY PHYSICIAN EST PT NOT ORIG PREV 7 DAYS 5-10 MIN: CPT | Performed by: NURSE PRACTITIONER

## 2020-03-30 RX ORDER — ISOSORBIDE MONONITRATE 30 MG/1
30 TABLET, EXTENDED RELEASE ORAL DAILY
Qty: 90 TABLET | Refills: 3 | Status: SHIPPED | OUTPATIENT
Start: 2020-03-30 | End: 2021-01-21 | Stop reason: SDUPTHER

## 2020-03-30 ASSESSMENT — ENCOUNTER SYMPTOMS
DIAPHORESIS: 0
GASTROINTESTINAL NEGATIVE: 1
WEIGHT LOSS: 0
SYNCOPE: 0
DECREASED APPETITE: 0
WEIGHT GAIN: 0
NEUROLOGICAL NEGATIVE: 1
SHORTNESS OF BREATH: 1

## 2020-04-01 ENCOUNTER — TELEPHONE (OUTPATIENT)
Dept: HEMATOLOGY/ONCOLOGY | Facility: HOSPITAL | Age: 75
End: 2020-04-01

## 2020-04-09 ENCOUNTER — TELEPHONE (OUTPATIENT)
Dept: HEMATOLOGY/ONCOLOGY | Facility: HOSPITAL | Age: 75
End: 2020-04-09

## 2020-04-09 ENCOUNTER — APPOINTMENT (OUTPATIENT)
Dept: HEMATOLOGY/ONCOLOGY | Facility: HOSPITAL | Age: 75
End: 2020-04-09
Attending: INTERNAL MEDICINE
Payer: MEDICARE

## 2020-04-09 NOTE — TELEPHONE ENCOUNTER
Spoke to Brian of new clinic schedules given COVID crisis. His next scheduled treatment he will be seen by one of my partners. He prefers to see me and will move his appointment a day later to Friday.

## 2020-04-10 ENCOUNTER — OFFICE VISIT (OUTPATIENT)
Dept: HEMATOLOGY/ONCOLOGY | Facility: HOSPITAL | Age: 75
End: 2020-04-10
Attending: INTERNAL MEDICINE
Payer: MEDICARE

## 2020-04-10 VITALS
SYSTOLIC BLOOD PRESSURE: 164 MMHG | RESPIRATION RATE: 18 BRPM | HEART RATE: 67 BPM | HEIGHT: 70 IN | TEMPERATURE: 97 F | OXYGEN SATURATION: 97 % | DIASTOLIC BLOOD PRESSURE: 79 MMHG | BODY MASS INDEX: 29.52 KG/M2 | WEIGHT: 206.19 LBS

## 2020-04-10 DIAGNOSIS — Z79.4 TYPE 2 DIABETES MELLITUS WITH OTHER CIRCULATORY COMPLICATION, WITH LONG-TERM CURRENT USE OF INSULIN (HCC): ICD-10-CM

## 2020-04-10 DIAGNOSIS — H57.9 VISUAL SYMPTOMS: ICD-10-CM

## 2020-04-10 DIAGNOSIS — C34.81 MALIGNANT NEOPLASM OF OVERLAPPING SITES OF RIGHT LUNG (HCC): ICD-10-CM

## 2020-04-10 DIAGNOSIS — R59.0 HILAR ADENOPATHY: ICD-10-CM

## 2020-04-10 DIAGNOSIS — C78.7 LIVER METASTASES (HCC): Primary | ICD-10-CM

## 2020-04-10 DIAGNOSIS — E11.59 TYPE 2 DIABETES MELLITUS WITH OTHER CIRCULATORY COMPLICATION, WITH LONG-TERM CURRENT USE OF INSULIN (HCC): ICD-10-CM

## 2020-04-10 DIAGNOSIS — H93.A2 PULSATILE TINNITUS OF LEFT EAR: ICD-10-CM

## 2020-04-10 DIAGNOSIS — R06.02 SHORTNESS OF BREATH: ICD-10-CM

## 2020-04-10 DIAGNOSIS — C34.81 MALIGNANT NEOPLASM OF OVERLAPPING SITES OF RIGHT LUNG (HCC): Primary | ICD-10-CM

## 2020-04-10 PROCEDURE — 96413 CHEMO IV INFUSION 1 HR: CPT

## 2020-04-10 PROCEDURE — 99215 OFFICE O/P EST HI 40 MIN: CPT | Performed by: INTERNAL MEDICINE

## 2020-04-10 NOTE — PROGRESS NOTES
Pt here for C 6 D 1 keytruda.   Arrives Ambulating independently, accompanied by Self           Patient reports possible pregnancy since last therapy cycle: Not Applicable    Modifications in dose or schedule: No     Frequency of blood return and site check

## 2020-04-10 NOTE — PROGRESS NOTES
Cancer Center Progress Note    Patient Name: Danie Francis   YOB: 1945   Medical Record Number: [id number]   CSN: [id number]   Attending Physician: Sandra Sorto M.D.    Referring Physician: Morgan Hebert fevers or recent sick contacts. Denies cough or increased SOB, chest or abdominal pain, change in his bowel or urinary habits, headaches, dizziness or visual symptoms. No hoarseness or hemoptysis. No rash, itching or diarrhea.      Current Medications: impairment    • High blood pressure    • High cholesterol    • Osteoarthrosis, unspecified whether generalized or localized, unspecified site    • Other and unspecified hyperlipidemia    • Pancreatitis 1986   • Pulmonary emphysema ([facility]    • Type II or uns Social connections:        Talks on phone: Not on file        Gets together: Not on file        Attends Faith service: Not on file        Active member of club or organization: Not on file        Attends meetings of clubs or organizations: Not on file 10.0 - 20.0    Calcium, Total 9.2 8.5 - 10.1 mg/dL    Calculated Osmolality 293 275 - 295 mOsm/kg    GFR, Non- 63 >=60    GFR, -American 73 >=60    AST 16 15 - 37 U/L    ALT 22 16 - 61 U/L    Alkaline Phosphatase 86 45 - 117 U/L    B Dose information is transmitted to the [facility] [employer] of   Radiology) NRDR ([address]) which includes the Dose Index Registry. FINDINGS:    VASCULATURE:  No visible pulmonary arterial thrombus or attenuation.     THORACIC A IV contrast-enhanced scanning was performed through the chest and abdomen. Dose reduction techniques were used.  Dose information is transmitted to the  [facility]) Fabián Faustin [address] ([address]) which includes   the Dose In pancreatic duct is prominent size measuring up to 6 mm. This is incompletely characterized. However the appearance is grossly stable when   compared with an exam of 2014. Postsurgical changes are noted in the retroperitoneum.   Correlation with the histor of lung field. COMPARISON STUDY: None. TECHNIQUE:  PET-CT imaging was performed after IV administration of 17.3 mCi of F-18 FDG, with an  uptake time of 90 minutes.   Tomographic images were obtained from the orbits through the thighs,  with the patient i SUV of 6.6. These lesions remain suspicious for metastases. FDG distribution in the abdomen and pelvis is otherwise within physiologic normal limits. The adrenal glands are normal in size and FDG uptake.   No hypermetabolic lymph nodes are seen in the abd voids are present. Calvarium: Normal marrow signal is seen. Orbits: Normal  Paranasal sinuses and mastoid air cells: Normal  Skull base, craniocervical junction and surrounding soft tissues: Normal  =====  IMPRESSION:  1.  No findings are seen to suggest

## 2020-05-08 ENCOUNTER — OFFICE VISIT (OUTPATIENT)
Dept: HEMATOLOGY/ONCOLOGY | Facility: HOSPITAL | Age: 75
End: 2020-05-08
Attending: INTERNAL MEDICINE
Payer: MEDICARE

## 2020-05-08 VITALS
RESPIRATION RATE: 16 BRPM | OXYGEN SATURATION: 96 % | HEART RATE: 63 BPM | BODY MASS INDEX: 29.18 KG/M2 | WEIGHT: 203.81 LBS | SYSTOLIC BLOOD PRESSURE: 176 MMHG | DIASTOLIC BLOOD PRESSURE: 78 MMHG | TEMPERATURE: 97 F | HEIGHT: 70 IN

## 2020-05-08 DIAGNOSIS — R59.0 HILAR ADENOPATHY: ICD-10-CM

## 2020-05-08 DIAGNOSIS — H57.9 VISUAL SYMPTOMS: ICD-10-CM

## 2020-05-08 DIAGNOSIS — E11.59 TYPE 2 DIABETES MELLITUS WITH OTHER CIRCULATORY COMPLICATION, WITH LONG-TERM CURRENT USE OF INSULIN (HCC): ICD-10-CM

## 2020-05-08 DIAGNOSIS — R06.02 SHORTNESS OF BREATH: ICD-10-CM

## 2020-05-08 DIAGNOSIS — C34.81 MALIGNANT NEOPLASM OF OVERLAPPING SITES OF RIGHT LUNG (HCC): Primary | ICD-10-CM

## 2020-05-08 DIAGNOSIS — R53.83 FATIGUE DUE TO TREATMENT: ICD-10-CM

## 2020-05-08 DIAGNOSIS — Z79.4 TYPE 2 DIABETES MELLITUS WITH OTHER CIRCULATORY COMPLICATION, WITH LONG-TERM CURRENT USE OF INSULIN (HCC): ICD-10-CM

## 2020-05-08 DIAGNOSIS — C78.7 LIVER METASTASES (HCC): ICD-10-CM

## 2020-05-08 PROCEDURE — 99215 OFFICE O/P EST HI 40 MIN: CPT | Performed by: INTERNAL MEDICINE

## 2020-05-08 PROCEDURE — 96413 CHEMO IV INFUSION 1 HR: CPT

## 2020-05-08 NOTE — PROGRESS NOTES
Pt here for C 7 D 1 keytruda.   Arrives Ambulating independently, accompanied by Self           Patient reports possible pregnancy since last therapy cycle: Not Applicable    Modifications in dose or schedule: No     Frequency of blood return and site check

## 2020-05-11 ENCOUNTER — TELEPHONE (OUTPATIENT)
Dept: HEMATOLOGY/ONCOLOGY | Facility: HOSPITAL | Age: 75
End: 2020-05-11

## 2020-05-11 NOTE — PROGRESS NOTES
Cancer Center Progress Note    Patient Name: Charline Heath   YOB: 1945   Medical Record Number: [id number]   CSN: [id number]   Attending Physician: Marie Milian M.D.    Referring Physician: Weston Moore Dr. Elvia Bowers Dr. Danita Oppenheim some of the weight he had gained the last few months. Eating well he says. Not very active. Has CHAHAL and rare cough. Denies cough or increased SOB, chest or abdominal pain, change in his bowel or urinary habits, headaches, dizziness or visual symptoms.  No h morning., Disp: , Rfl:     Past Medical History:  Past Medical History:   Diagnosis Date   • Atherosclerosis of coronary artery    • Cancer (HCC)    • Esophageal reflux    • Hearing impairment    • High blood pressure    • High cholesterol    • Osteoarthro activity: Not on file    Lifestyle      Physical activity:        Days per week: Not on file        Minutes per session: Not on file      Stress: Not on file    Relationships      Social connections:        Talks on phone: Not on file        Gets together: 05/08/2020    NEPRELIM 4.77 05/08/2020    NEPERCENT 43.7 05/08/2020    LYPERCENT 49.0 05/08/2020    MOPERCENT 5.2 05/08/2020    EOPERCENT 1.5 05/08/2020    BAPERCENT 0.4 05/08/2020    NE 4.77 05/08/2020    LYMABS 5.34 (H) 05/08/2020    MOABSO 0.57 05/08/20 3.0 x 1.7 cm. There is a small 3 mm endobronchial   filling defect in the right mainstem bronchus which appears new. There is bilateral subsegmental atelectasis. MEDIASTINUM:  Calcified subcarinal adenopathy.   KT:  Partially calcified right hilar ly limits assessment of the lungs. There has been interval decrease in size of several representative nodular lesions within the lungs. An area of the left medial lower lobe superiorly   measures up to 1.0 x 2.0 cm.   This previously measured up to 1.5 x 2.2 appearance. AORTA:  Mild calcific atherosclerosis noted. RETROPERITONEUM:  A mildly prominent retroperitoneal lymph node in the region of the danis hepatis measures up to 1.2 x 1.8 cm. This is stable dating back to 2014.    BOWEL/MESENTERY:  Lack of oral and ALAJULES manual techniques for patient specific dose reduction were  followed while maintaining the necessary diagnostic image quality. FINDINGS:   NECK:  No hypermetabolic lymph nodes are found in the neck.   CHEST:  There is an approximately 3.3 x 2.9 c portions of the  skeleton.  =====  IMPRESSION:  1. A markedly hypermetabolic 3.3 cm right upper lobe mass is consistent with bronchogenic  malignancy.   2. Other hypermetabolic nodules in the superior segment left lower lobe, measuring up to 2.0 cm, may  re for PET before next visit    2. COPD- stable on Anoro     3. Early melanoma of the RUE- he will continue to follow-up with dermatology for full body skin exams on a regular basis. 4. CAD s/p CABG and multiple PCI- continue current cardiac regimen.  Saw c

## 2020-05-11 NOTE — TELEPHONE ENCOUNTER
Mando Quintana called and was looking to speak with Dr. Latha Estrada or the nurse about an upcoming pet scan. She asked that she get a call back to go over this. Pt's wife states that his last PET scan was done in [address]. Is it OK to have it now at [facility]?

## 2020-05-15 ENCOUNTER — TELEPHONE (OUTPATIENT)
Dept: HEMATOLOGY/ONCOLOGY | Facility: HOSPITAL | Age: 75
End: 2020-05-15

## 2020-05-15 NOTE — TELEPHONE ENCOUNTER
Tien's wife called with questions about a comment they saw on mychart regarding a CBC. She would like to speak with a nurse about this.  Please call

## 2020-05-26 ENCOUNTER — HOSPITAL ENCOUNTER (OUTPATIENT)
Dept: NUCLEAR MEDICINE | Facility: HOSPITAL | Age: 75
Discharge: HOME | End: 2020-05-26
Attending: INTERNAL MEDICINE
Payer: MEDICARE

## 2020-05-26 DIAGNOSIS — C34.81 MALIGNANT NEOPLASM OF OVERLAPPING SITES OF RIGHT LUNG (HCC): ICD-10-CM

## 2020-05-26 PROCEDURE — 78815 PET IMAGE W/CT SKULL-THIGH: CPT | Performed by: INTERNAL MEDICINE

## 2020-05-26 PROCEDURE — 82962 GLUCOSE BLOOD TEST: CPT

## 2020-05-27 ENCOUNTER — TELEPHONE (OUTPATIENT)
Dept: HEMATOLOGY/ONCOLOGY | Facility: HOSPITAL | Age: 75
End: 2020-05-27

## 2020-05-27 NOTE — TELEPHONE ENCOUNTER
Called Elisabeth Pierce and went over PET results and recommendations at lung conference. Nodes appear to be most accessible by EUS and will arrange through GI.

## 2020-05-28 ENCOUNTER — OFFICE VISIT (OUTPATIENT)
Dept: HEMATOLOGY/ONCOLOGY | Facility: HOSPITAL | Age: 75
End: 2020-05-28
Attending: INTERNAL MEDICINE
Payer: MEDICARE

## 2020-05-28 VITALS
WEIGHT: 201 LBS | HEIGHT: 70 IN | BODY MASS INDEX: 28.77 KG/M2 | HEART RATE: 70 BPM | OXYGEN SATURATION: 97 % | DIASTOLIC BLOOD PRESSURE: 70 MMHG | RESPIRATION RATE: 16 BRPM | SYSTOLIC BLOOD PRESSURE: 118 MMHG | TEMPERATURE: 97 F

## 2020-05-28 DIAGNOSIS — D72.820 LYMPHOCYTOSIS: ICD-10-CM

## 2020-05-28 DIAGNOSIS — R53.83 FATIGUE DUE TO TREATMENT: ICD-10-CM

## 2020-05-28 DIAGNOSIS — R59.0 HILAR ADENOPATHY: ICD-10-CM

## 2020-05-28 DIAGNOSIS — C78.7 LIVER METASTASES (HCC): Primary | ICD-10-CM

## 2020-05-28 DIAGNOSIS — C34.81 MALIGNANT NEOPLASM OF OVERLAPPING SITES OF RIGHT LUNG (HCC): ICD-10-CM

## 2020-05-28 DIAGNOSIS — R59.9 ADENOPATHY: ICD-10-CM

## 2020-05-28 DIAGNOSIS — C34.81 MALIGNANT NEOPLASM OF OVERLAPPING SITES OF RIGHT LUNG (HCC): Primary | ICD-10-CM

## 2020-05-28 PROCEDURE — 96413 CHEMO IV INFUSION 1 HR: CPT

## 2020-05-28 PROCEDURE — 99215 OFFICE O/P EST HI 40 MIN: CPT | Performed by: INTERNAL MEDICINE

## 2020-05-28 NOTE — PROGRESS NOTES
Pt here for C8D1.   Arrives Ambulating independently, accompanied by Self           Patient reports possible pregnancy since last therapy cycle: Not Applicable    Modifications in dose or schedule: No     Frequency of blood return and site check throughout

## 2020-05-28 NOTE — PROGRESS NOTES
Cancer Center Progress Note    Patient Name: Germán Thomas   YOB: 1945   Medical Record Number: [id number]   CSN: [id number]   Attending Physician: Bryan Mo M.D.    Referring Physician: Desiree Joe cough- unchanged from before. Denies chest or abdominal pain, change in his bowel or urinary habits, headaches, dizziness or visual symptoms. No hoarseness or hemoptysis. No rash, itching or diarrhea. No fevers or recent sick contacts.        Current Medic Osteoarthrosis, unspecified whether generalized or localized, unspecified site    • Other and unspecified hyperlipidemia    • Pancreatitis 1986   • [facility]    • Type II or unspecified type diabetes mellitus without mention of complication together: Not on file        Attends Adventism service: Not on file        Active member of club or organization: Not on file        Attends meetings of clubs or organizations: Not on file        Relationship status: Not on file      Intimate partner viole EOABSO 0.16 05/28/2020    BAABSO 0.04 05/28/2020     Lab Results   Component Value Date     (H) 05/28/2020    BUN 26 (H) 05/28/2020    BUNCREA 21.8 (H) 05/28/2020    CREATSERUM 1.19 05/28/2020    ANIONGAP 9 05/28/2020    GFR 77 01/03/2018    GFRNAA there is no abnormal FDG activity in this area. No new chest pathology. There are multiple upper abdominal lymph nodes center on the celiac axis and pancreatic head.   The lymph node to the right of the celiac axis and just caudal to hepatic segment 1 angiography is performed through the pulmonary arterial anatomy. 3D volume renderings are generated. Dose reduction techniques were used.  Dose information is transmitted to the [facility] ([address] [place] [address] of   Radiology) Fabián Faustin [address] ([address]) 11/14/2019, 15:55. Chest CT 12/30/2014     INDICATIONS:  C34.81 Malignant neoplasm of overlapping sites of right bronchus and lung     TECHNIQUE:  IV contrast-enhanced scanning was performed through the chest and abdomen.   Dose reduction techniques were u dilatation or calcification. PANCREAS:  The tail the pancreas is not well seen and somewhat atrophic. The proximal aspect of the visualized pancreatic duct is prominent size measuring up to 6 mm. This is incompletely characterized.   However the appear 2/04/2020 at 11:00           [address]: 12.03.2019     WHOLE BODY PET/CT TUMOR IMAGING  CLINICAL INFORMATION:  Other nonspecific abnormal finding of lung field. COMPARISON STUDY: None.   TECHNIQUE:  PET-CT imaging was performed after IV administratio right lobe, at the junction of  the anterior and posterior segments (not definitely identified on CT portion of the study to be  measured) with maximum SUV of 6.6. These lesions remain suspicious for metastases.   FDG distribution in the abdomen and pelvis accessible for biopsy by EUS. Discussed this at length with Mr. Werner Morrisoncandice and will arrange through GI. Will defer to them whether  MRCP/ERCP indicated. In the interim will continue on IO as before. No overt evidence of immune mediated AEs.      2. COPD- stabl

## 2020-06-02 ENCOUNTER — TELEPHONE (OUTPATIENT)
Dept: CARDIOLOGY | Age: 75
End: 2020-06-02

## 2020-06-06 ENCOUNTER — LAB ENCOUNTER (OUTPATIENT)
Dept: LAB | Facility: HOSPITAL | Age: 75
End: 2020-06-06
Attending: EMERGENCY MEDICINE
Payer: MEDICARE

## 2020-06-06 DIAGNOSIS — R93.3 ABNORMAL FINDING ON GI TRACT IMAGING: ICD-10-CM

## 2020-06-08 RX ORDER — ATORVASTATIN CALCIUM 10 MG/1
TABLET, FILM COATED ORAL
Qty: 90 TABLET | Refills: 0 | Status: SHIPPED | OUTPATIENT
Start: 2020-06-08 | End: 2020-07-13

## 2020-06-09 ENCOUNTER — ANESTHESIA (OUTPATIENT)
Dept: ENDOSCOPY | Facility: HOSPITAL | Age: 75
End: 2020-06-09
Payer: MEDICARE

## 2020-06-09 ENCOUNTER — HOSPITAL ENCOUNTER (OUTPATIENT)
Facility: HOSPITAL | Age: 75
Setting detail: HOSPITAL OUTPATIENT SURGERY
Discharge: HOME | End: 2020-06-09
Attending: INTERNAL MEDICINE | Admitting: INTERNAL MEDICINE
Payer: MEDICARE

## 2020-06-09 ENCOUNTER — ANESTHESIA EVENT (OUTPATIENT)
Dept: ENDOSCOPY | Facility: HOSPITAL | Age: 75
End: 2020-06-09
Payer: MEDICARE

## 2020-06-09 VITALS
DIASTOLIC BLOOD PRESSURE: 78 MMHG | TEMPERATURE: 97 F | HEART RATE: 56 BPM | BODY MASS INDEX: 28.63 KG/M2 | WEIGHT: 200 LBS | RESPIRATION RATE: 20 BRPM | SYSTOLIC BLOOD PRESSURE: 147 MMHG | HEIGHT: 70 IN | OXYGEN SATURATION: 95 %

## 2020-06-09 DIAGNOSIS — C79.9 METASTATIC CANCER (HCC): ICD-10-CM

## 2020-06-09 DIAGNOSIS — R93.3 ABNORMAL FINDING ON GI TRACT IMAGING: Primary | ICD-10-CM

## 2020-06-09 PROCEDURE — 0D998ZX DRAINAGE OF DUODENUM, VIA NATURAL OR ARTIFICIAL OPENING ENDOSCOPIC, DIAGNOSTIC: ICD-10-PCS | Performed by: INTERNAL MEDICINE

## 2020-06-09 PROCEDURE — 82962 GLUCOSE BLOOD TEST: CPT

## 2020-06-09 PROCEDURE — 88173 CYTOPATH EVAL FNA REPORT: CPT | Performed by: INTERNAL MEDICINE

## 2020-06-09 PROCEDURE — 88341 IMHCHEM/IMCYTCHM EA ADD ANTB: CPT | Performed by: INTERNAL MEDICINE

## 2020-06-09 PROCEDURE — 88172 CYTP DX EVAL FNA 1ST EA SITE: CPT | Performed by: INTERNAL MEDICINE

## 2020-06-09 PROCEDURE — 88342 IMHCHEM/IMCYTCHM 1ST ANTB: CPT | Performed by: INTERNAL MEDICINE

## 2020-06-09 PROCEDURE — 88305 TISSUE EXAM BY PATHOLOGIST: CPT | Performed by: INTERNAL MEDICINE

## 2020-06-09 PROCEDURE — 88360 TUMOR IMMUNOHISTOCHEM/MANUAL: CPT | Performed by: INTERNAL MEDICINE

## 2020-06-09 RX ORDER — ONDANSETRON 2 MG/ML
4 INJECTION INTRAMUSCULAR; INTRAVENOUS AS NEEDED
Status: CANCELLED | OUTPATIENT
Start: 2020-06-09 | End: 2020-06-09

## 2020-06-09 RX ORDER — SODIUM CHLORIDE, SODIUM LACTATE, POTASSIUM CHLORIDE, CALCIUM CHLORIDE 600; 310; 30; 20 MG/100ML; MG/100ML; MG/100ML; MG/100ML
INJECTION, SOLUTION INTRAVENOUS CONTINUOUS
Status: DISCONTINUED | OUTPATIENT
Start: 2020-06-09 | End: 2020-06-09

## 2020-06-09 RX ORDER — SODIUM CHLORIDE, SODIUM LACTATE, POTASSIUM CHLORIDE, CALCIUM CHLORIDE 600; 310; 30; 20 MG/100ML; MG/100ML; MG/100ML; MG/100ML
INJECTION, SOLUTION INTRAVENOUS CONTINUOUS
Status: CANCELLED | OUTPATIENT
Start: 2020-06-09

## 2020-06-09 RX ORDER — NALOXONE HYDROCHLORIDE 0.4 MG/ML
80 INJECTION, SOLUTION INTRAMUSCULAR; INTRAVENOUS; SUBCUTANEOUS AS NEEDED
Status: CANCELLED | OUTPATIENT
Start: 2020-06-09 | End: 2020-06-09

## 2020-06-09 RX ORDER — LIDOCAINE HYDROCHLORIDE 10 MG/ML
INJECTION, SOLUTION EPIDURAL; INFILTRATION; INTRACAUDAL; PERINEURAL AS NEEDED
Status: DISCONTINUED | OUTPATIENT
Start: 2020-06-09 | End: 2020-06-09 | Stop reason: SURG

## 2020-06-09 RX ORDER — DEXTROSE MONOHYDRATE 25 G/50ML
50 INJECTION, SOLUTION INTRAVENOUS
Status: CANCELLED | OUTPATIENT
Start: 2020-06-09

## 2020-06-09 RX ORDER — DEXTROSE MONOHYDRATE 25 G/50ML
50 INJECTION, SOLUTION INTRAVENOUS
Status: DISCONTINUED | OUTPATIENT
Start: 2020-06-09 | End: 2020-06-09

## 2020-06-09 RX ADMIN — LIDOCAINE HYDROCHLORIDE 30 MG: 10 INJECTION, SOLUTION EPIDURAL; INFILTRATION; INTRACAUDAL; PERINEURAL at 08:16:00

## 2020-06-09 RX ADMIN — SODIUM CHLORIDE, SODIUM LACTATE, POTASSIUM CHLORIDE, CALCIUM CHLORIDE: 600; 310; 30; 20 INJECTION, SOLUTION INTRAVENOUS at 08:44:00

## 2020-06-09 RX ADMIN — SODIUM CHLORIDE, SODIUM LACTATE, POTASSIUM CHLORIDE, CALCIUM CHLORIDE: 600; 310; 30; 20 INJECTION, SOLUTION INTRAVENOUS at 08:11:00

## 2020-06-09 NOTE — H&P
Jarad Arce Patient Status:  Hospital Outpatient Surgery    2/3/1945 MRN [id number]   [facility] ENDOSCOPY Attending Gavin Cortes MD   Date 2020 PCP Bertha BERRY     CC: New hypermetabolic up BIOPSY LIVER  2019   • OTHER SURGICAL HISTORY  2002    LeftTKR   • OTHER SURGICAL HISTORY  2009    both knees replaced   • OTHER SURGICAL HISTORY      RHINOPLASTY    • OTHER SURGICAL HISTORY      FEET SURGERY   • OTHER SURGICAL HISTORY      pancreatic pins (coronary artery disease)     Angina effort     S/P PTCA (percutaneous transluminal coronary angioplasty)     Angina, class III ([facility])     Angina pectoris associated with type 2 diabetes mellitus (HCC)     S/P CABG x 4     DM (diabetes mellitus) type II con

## 2020-06-09 NOTE — ANESTHESIA PREPROCEDURE EVALUATION
PRE-OP EVALUATION    Patient Name: Tod Crew    Pre-op Diagnosis: Abnormal finding on GI tract imaging [R93.3]    Procedure(s):  ENDOSCOPIC ULTRASOUND WITH FINE NEEDLE ASPIRATION, FINE NEEDLE BIOPSY    Surgeon(s) and Role:     Tiffanie Calzada MD Good LV function. 2.     Patent stent of left main. 3.     A 90% ramus. 4.     Patent stents to the right coronary artery. 5.     Patent vein graft to the diagonal and obtuse marginal, with some disease at the origin of both anastomoses.   6.     Buster lung [facility])          Past Surgical History:   Procedure Laterality Date   • ANGIOGRAM     • ANGIOPLASTY (CORONARY)  3/7/2015    RCA   • APPENDECTOMY     • CABG  1999    x 4   • CATH DRUG ELUTING STENT      x 5   • COLONOSCOPY  2015   • EGD  2010   • ERCP,DI patient                Present on Admission:  **None**

## 2020-06-09 NOTE — ANESTHESIA POSTPROCEDURE EVALUATION
[address] Patient Status:  Hospital Outpatient Surgery   Age/Gender 67year old male MRN [id number]   Location [facility]. Attending Severo Posey, MD   Hosp Day # 0 JAVI BERRY       Anesthesia Post-op Note

## 2020-06-09 NOTE — OPERATIVE REPORT
Velasquez Souza Patient Status:  Hospital Outpatient Surgery    2/3/1945 MRN [id number]   [facility] ENDOSCOPY Attending Grazyna Antonio MD   Date 2020 PCP Caroline BERRY     PREOPERATIVE DIAGNOSIS/INDICATION: New hypermetabolic uppe the visualized pancreatic parenchyma appear wnl, the main PD appear wnl, the biliary tree appear wnl, there was a 2.2 cm heterogeneous hypoechoic mass at the periportal region.   THERAPEUTICS: FNB, 22 G Aquire needle, 5 passes, multiple throws through [employer]

## 2020-06-12 ENCOUNTER — TELEPHONE (OUTPATIENT)
Dept: HEMATOLOGY/ONCOLOGY | Facility: HOSPITAL | Age: 75
End: 2020-06-12

## 2020-06-12 ENCOUNTER — PATIENT MESSAGE (OUTPATIENT)
Dept: HEMATOLOGY/ONCOLOGY | Facility: HOSPITAL | Age: 75
End: 2020-06-12

## 2020-06-12 NOTE — TELEPHONE ENCOUNTER
Called him with biopsy results. Positive for NSCLC- squamous. Same as previous.  As previous known sites of disease gone on IO but with new disease below the diaphragm, plan to continue pembro but will add carboplatin and nab-pac (preferred to Taxol as diab

## 2020-06-15 ENCOUNTER — DIETICIAN VISIT (OUTPATIENT)
Dept: HEMATOLOGY/ONCOLOGY | Facility: HOSPITAL | Age: 75
End: 2020-06-15

## 2020-06-18 ENCOUNTER — OFFICE VISIT (OUTPATIENT)
Dept: HEMATOLOGY/ONCOLOGY | Facility: HOSPITAL | Age: 75
End: 2020-06-18
Attending: INTERNAL MEDICINE
Payer: MEDICARE

## 2020-06-18 VITALS
TEMPERATURE: 98 F | BODY MASS INDEX: 29.38 KG/M2 | SYSTOLIC BLOOD PRESSURE: 126 MMHG | OXYGEN SATURATION: 96 % | HEIGHT: 68.9 IN | WEIGHT: 198.38 LBS | RESPIRATION RATE: 16 BRPM | HEART RATE: 64 BPM | DIASTOLIC BLOOD PRESSURE: 70 MMHG

## 2020-06-18 DIAGNOSIS — C78.7 LIVER METASTASES (HCC): ICD-10-CM

## 2020-06-18 DIAGNOSIS — D72.820 LYMPHOCYTOSIS: ICD-10-CM

## 2020-06-18 DIAGNOSIS — C34.81 MALIGNANT NEOPLASM OF OVERLAPPING SITES OF RIGHT LUNG (HCC): Primary | ICD-10-CM

## 2020-06-18 DIAGNOSIS — R59.9 ADENOPATHY: ICD-10-CM

## 2020-06-18 DIAGNOSIS — R59.0 HILAR ADENOPATHY: ICD-10-CM

## 2020-06-18 DIAGNOSIS — R53.83 FATIGUE DUE TO TREATMENT: ICD-10-CM

## 2020-06-18 PROCEDURE — 99215 OFFICE O/P EST HI 40 MIN: CPT | Performed by: INTERNAL MEDICINE

## 2020-06-18 PROCEDURE — 96413 CHEMO IV INFUSION 1 HR: CPT

## 2020-06-18 NOTE — PROGRESS NOTES
Cancer Center Progress Note    Patient Name: Bhavesh Corcoran   YOB: 1945   Medical Record Number: [id number]   CSN: [id number]   Attending Physician: Sharlee Lesches, M.D.    Referring Physician: Nino Guerra improved and eating well. Has CHAHAL and rare cough- unchanged from before. Denies chest or abdominal pain, change in his bowel or urinary habits, headaches, dizziness or visual symptoms. No hoarseness or hemoptysis. No rash, itching or diarrhea.  No fevers o 20 mg by mouth every morning., Disp: , Rfl:     Past Medical History:  Past Medical History:   Diagnosis Date   • Arthritis several years   • Atherosclerosis of coronary artery    • Back pain several years   • Cancer ([facility] [address]    • Chest pain on exertion on oc Socioeconomic History      Marital status:       Spouse name: Not on file      Number of children: Not on file      Years of education: Not on file      Highest education level: Not on file    Occupational History      Not on file    Social Needs 1  General: Patient is alert and oriented x 3, not in acute distress. Psych:  Mood and affect appropriate  HEENT: EOMs intact. PERRL. Oropharynx is clear. Neck: No JVD. No palpable lymphadenopathy. Neck is supple. Lymphatics:  There is no palpable lymph AM.  Barbara Magdaleno , CT, CT CHEST+ABDOMEN (ALL CNTRST ONLY) (ZIF=[id number]), 2/04/2020, 10:24 AM.  Barbara Magdaleno , CT, CT ANGIOGRAPHY, CHEST (CPT=71275), 2/27/2020,   2:53 PM.     INDICATIONS:  C34.81 Malignant neoplasm of overlapping sites of right bronchus and lung previously demonstrated lesions in the liver on 12/3/2019 are not identified. Bladder calculi are noted. There is also a tiny nonobstructing left renal calculus. CONCLUSION:    1.  Favorable response to therapy with resolution of abnormal activity measuring 2.0 x 1.6 cm  CARDIAC:  No significant pericardial effusion. PLEURA:  No pneumothorax or effusion. LIMITED ABDOMEN:  Scattered calcified granulomata throughout the liver and spleen.   Enlarged adenopathy in the peripancreatic region not signif of the right hilar region extending into the right upper and lower lobes has decreased in prominence. This now measures approximately 3.0 x 1.7 cm compared   with 3.0 x 3.9 cm previously.   Mild atelectasis and scarring is present of the medial lung bases assessment. No dilated loops of small bowel are seen. No free fluid is seen. ABDOMINAL WALL:  Numerous postsurgical changes noted with presumed hernia repair of the anterior upper abdomen.   BONES:  Moderate multilevel degenerative changes of the visuali posterior perihilar corner of the  posterior segment of the right upper lobe which show significant FDG accumulation with maximum of  16.7, highly suspicious for malignancy.   There is a cluster of smaller nodules in the medial and superior corner of the oakley neoplasm or represent metastases. 3. Single hypermetabolic right hilar lymph node, consistent with metastatic lymphadenopathy. 4. Two hypermetabolic hepatic lesions suggest metastases. 5. Bladder calculus.     DATE OF SERVICE: 12.11.2019     Component  R Positive  TTF1                                 Negative              CK7                                   Negative     Positive tissue controls were utilized in the staining process.   These slides were reviewed by the signout Pathologist and showed approp retroperitoneal nodes. Pseudoprogression? Biopsy was performed of a retroperitoneal node was performed which showed metastatic poorly differentiated carcinoma with squamous differentiation which was similar to his previous biopsy.  PDL-1 score was exactly t

## 2020-06-18 NOTE — PROGRESS NOTES
Pt here for Gunnar Bowen  Arrives Ambulating independently, accompanied by Self           Patient reports possible pregnancy since last therapy cycle: Not Applicable    Modifications in dose or schedule: No     Frequency of blood return and site check througho

## 2020-06-19 ENCOUNTER — TELEPHONE (OUTPATIENT)
Dept: HEMATOLOGY/ONCOLOGY | Facility: HOSPITAL | Age: 75
End: 2020-06-19

## 2020-06-19 NOTE — TELEPHONE ENCOUNTER
Mariana Guzman returned call to Yamilka Charles, he states he did not start new therapy but continued on same immunotherapy and is not having any problems. Reinforced to call for any problems.

## 2020-06-19 NOTE — TELEPHONE ENCOUNTER
Toxicities: C1 D1 Albumin-bound Paclitaxel/Carboplatin/Pembrolizumab on 6/18/2020    I attempted to reach Angellajazmine Martínez to see how he is doing after his first treatment. No answer.  I left a voice mail message asking him to please return my call at his earliest con

## 2020-06-22 NOTE — PROGRESS NOTES
Nutrition Consultation    Patient Name: Monae Felton  YOB: 1945  Medical Record Number: [id number]   Account Number: [de-identified]  Dietitian: Jesse Ruiz RD, SAKINA      Date of visit: 6/18/2020    Diet Rx: high protein/calorie, CHO contro  MG Oral Tab, TAKE 1 TABLET 3 TIMES A DAY, Disp: 270 tablet, Rfl: 3  •  Umeclidinium-Vilanterol (ANORO ELLIPTA IN), Inhale 1 puff into the lungs daily.   , Disp: , Rfl:   •  Naproxen Sodium (ALEVE) 220 MG Oral Cap, Take 220 mg by mouth daily as neede

## 2020-07-09 ENCOUNTER — OFFICE VISIT (OUTPATIENT)
Dept: HEMATOLOGY/ONCOLOGY | Facility: HOSPITAL | Age: 75
End: 2020-07-09
Attending: INTERNAL MEDICINE
Payer: MEDICARE

## 2020-07-09 VITALS
RESPIRATION RATE: 16 BRPM | TEMPERATURE: 97 F | DIASTOLIC BLOOD PRESSURE: 70 MMHG | SYSTOLIC BLOOD PRESSURE: 135 MMHG | WEIGHT: 201 LBS | HEART RATE: 62 BPM | HEIGHT: 68.9 IN | OXYGEN SATURATION: 97 % | BODY MASS INDEX: 29.77 KG/M2

## 2020-07-09 DIAGNOSIS — R06.02 SHORTNESS OF BREATH: ICD-10-CM

## 2020-07-09 DIAGNOSIS — R59.0 HILAR ADENOPATHY: ICD-10-CM

## 2020-07-09 DIAGNOSIS — R53.83 FATIGUE DUE TO TREATMENT: ICD-10-CM

## 2020-07-09 DIAGNOSIS — D72.820 LYMPHOCYTOSIS: ICD-10-CM

## 2020-07-09 DIAGNOSIS — C34.81 MALIGNANT NEOPLASM OF OVERLAPPING SITES OF RIGHT LUNG (HCC): Primary | ICD-10-CM

## 2020-07-09 DIAGNOSIS — C34.81 MALIGNANT NEOPLASM OF OVERLAPPING SITES OF RIGHT LUNG (HCC): ICD-10-CM

## 2020-07-09 DIAGNOSIS — C78.7 LIVER METASTASES (HCC): Primary | ICD-10-CM

## 2020-07-09 DIAGNOSIS — R59.9 ADENOPATHY: ICD-10-CM

## 2020-07-09 LAB
ALBUMIN SERPL-MCNC: 3.8 G/DL (ref 3.4–5)
ALBUMIN/GLOB SERPL: 1.2 {RATIO} (ref 1–2)
ALP LIVER SERPL-CCNC: 97 U/L (ref 45–117)
ALT SERPL-CCNC: 19 U/L (ref 16–61)
ANION GAP SERPL CALC-SCNC: 8 MMOL/L (ref 0–18)
AST SERPL-CCNC: 13 U/L (ref 15–37)
BASOPHILS # BLD AUTO: 0.04 X10(3) UL (ref 0–0.2)
BASOPHILS NFR BLD AUTO: 0.4 %
BILIRUB SERPL-MCNC: 0.5 MG/DL (ref 0.1–2)
BUN BLD-MCNC: 16 MG/DL (ref 7–18)
BUN/CREAT SERPL: 13.3 (ref 10–20)
CALCIUM BLD-MCNC: 9 MG/DL (ref 8.5–10.1)
CHLORIDE SERPL-SCNC: 107 MMOL/L (ref 98–112)
CO2 SERPL-SCNC: 22 MMOL/L (ref 21–32)
CREAT BLD-MCNC: 1.2 MG/DL (ref 0.7–1.3)
DEPRECATED RDW RBC AUTO: 45 FL (ref 35.1–46.3)
EOSINOPHIL # BLD AUTO: 0.21 X10(3) UL (ref 0–0.7)
EOSINOPHIL NFR BLD AUTO: 2.3 %
ERYTHROCYTE [DISTWIDTH] IN BLOOD BY AUTOMATED COUNT: 14.1 % (ref 11–15)
GLOBULIN PLAS-MCNC: 3.3 G/DL (ref 2.8–4.4)
GLUCOSE BLD-MCNC: 256 MG/DL (ref 70–99)
HCT VFR BLD AUTO: 47.7 % (ref 39–53)
HGB BLD-MCNC: 15.4 G/DL (ref 13–17.5)
IMM GRANULOCYTES # BLD AUTO: 0.01 X10(3) UL (ref 0–1)
IMM GRANULOCYTES NFR BLD: 0.1 %
LYMPHOCYTES # BLD AUTO: 4.59 X10(3) UL (ref 1–4)
LYMPHOCYTES NFR BLD AUTO: 49.6 %
M PROTEIN MFR SERPL ELPH: 7.1 G/DL (ref 6.4–8.2)
MCH RBC QN AUTO: 28.6 PG (ref 26–34)
MCHC RBC AUTO-ENTMCNC: 32.3 G/DL (ref 31–37)
MCV RBC AUTO: 88.5 FL (ref 80–100)
MONOCYTES # BLD AUTO: 0.42 X10(3) UL (ref 0.1–1)
MONOCYTES NFR BLD AUTO: 4.5 %
NEUTROPHILS # BLD AUTO: 3.99 X10 (3) UL (ref 1.5–7.7)
NEUTROPHILS # BLD AUTO: 3.99 X10(3) UL (ref 1.5–7.7)
NEUTROPHILS NFR BLD AUTO: 43.1 %
OSMOLALITY SERPL CALC.SUM OF ELEC: 294 MOSM/KG (ref 275–295)
PATIENT FASTING Y/N/NP: NO
PLATELET # BLD AUTO: 239 10(3)UL (ref 150–450)
POTASSIUM SERPL-SCNC: 4.2 MMOL/L (ref 3.5–5.1)
RBC # BLD AUTO: 5.39 X10(6)UL (ref 3.8–5.8)
SODIUM SERPL-SCNC: 137 MMOL/L (ref 136–145)
TSI SER-ACNC: 1.1 MIU/ML (ref 0.36–3.74)
WBC # BLD AUTO: 9.3 X10(3) UL (ref 4–11)

## 2020-07-09 PROCEDURE — 99215 OFFICE O/P EST HI 40 MIN: CPT | Performed by: INTERNAL MEDICINE

## 2020-07-09 PROCEDURE — 96413 CHEMO IV INFUSION 1 HR: CPT

## 2020-07-09 NOTE — PROGRESS NOTES
Cancer Center Progress Note    Patient Name: Shazia Mullen   YOB: 1945   Medical Record Number: [id number]   CSN: [id number]   Attending Physician: Boston Kelly M.D.    Referring Physician: Christiano Greer golf. Only did 9 holes one day and 11 the next- stopped due to heat and humidity. Otherwise he says he felt fine with good energy. Appetite good. Has CHAHAL and rare cough which he says is improving.    Denies chest or abdominal pain, change in his bowel or ur tablet by mouth daily. , Disp: 90 tablet, Rfl: 4  •  omeprazole (PRILOSEC) 20 MG Oral Capsule Delayed Release, Take 20 mg by mouth every morning., Disp: , Rfl:     Past Medical History:  Past Medical History:   Diagnosis Date   • Arthritis several years   • • TONSILLECTOMY         Family Medical History:   Mother with h/o cancer    Psychosocial History:  Social History    Socioeconomic History      Marital status:       Spouse name: Not on file      Number of children: Not on file      Years of educat 16   Ht 1.75 m (5' 8.9\")   Wt 91.2 kg (201 lb)   SpO2 97%   BMI 29.77 kg/m²       Physical Examination:  PS 1  General: Patient is alert and oriented x 3, not in acute distress. Psych:  Mood and affect appropriate  HEENT: EOMs intact. PERRL.  Oropharynx i COMPARISON:  External Exams, NM, PET/CT STANDARD BODY SCAN (ONCOLOGY) (RKV=[id number]), 12/03/2019, 10:10 AM.  [facility] , CT, CT CHEST+ABDOMEN (ALL CNTRST ONLY) (BJC=[id number]), 2/04/2020, 10:24 AM.  [facility] , CT, CT ANGIOGRAPHY, CHEST (CPT=71275), 2/27/2020, superior   mesenteric artery. No other abnormal activity is seen in the abdomen and pelvis. The previously demonstrated lesions in the liver on 12/3/2019 are not identified. Bladder calculi are noted.   There is also a tiny nonobstructing left sherlyn Calcified subcarinal adenopathy. KT:  Partially calcified right hilar lymph node appears stable again measuring 2.0 x 1.6 cm  CARDIAC:  No significant pericardial effusion. PLEURA:  No pneumothorax or effusion.     LIMITED ABDOMEN:  Scattered calcified superiorly   measures up to 1.0 x 2.0 cm. This previously measured up to 1.5 x 2.2 cm. The spiculated lesion of the right hilar region extending into the right upper and lower lobes has decreased in prominence.   This now measures approximately 3.0 x 1.7 1. 8 cm. This is stable dating back to 2014. BOWEL/MESENTERY:  Lack of oral contrast somewhat limits assessment. No dilated loops of small bowel are seen. No free fluid is seen.   ABDOMINAL WALL:  Numerous postsurgical changes noted with presumed hernia lymph nodes are found in the neck.   CHEST:  There is an approximately 3.3 x 2.9 cm solid mass in the medial posterior perihilar corner of the  posterior segment of the right upper lobe which show significant FDG accumulation with maximum of  16.7, highly s nodules in the superior segment left lower lobe, measuring up to 2.0 cm, may  represent additional primary neoplasm or represent metastases. 3. Single hypermetabolic right hilar lymph node, consistent with metastatic lymphadenopathy.   4. Two hypermetaboli and any concerns about anxiety or depression. We discussed issues of distress, coping difficulties and social support systems and currently there are no active problems.     Jl Hayes MD  [facility]

## 2020-07-13 RX ORDER — ATORVASTATIN CALCIUM 10 MG/1
TABLET, FILM COATED ORAL
Qty: 90 TABLET | Refills: 0 | Status: SHIPPED | OUTPATIENT
Start: 2020-07-13 | End: 2020-09-16 | Stop reason: SDUPTHER

## 2020-07-14 LAB
CD10 CELLS NFR SPEC: <1 %
CD11C CELLS NFR SPEC: 8 %
CD14 CELLS NFR SPEC: <1 %
CD19 CELLS NFR SPEC: 62 %
CD19/CD10 CELLS: <1 %
CD20 CELLS NFR SPEC: 52 %
CD22 CELLS NFR SPEC: 68 %
CD23 CELLS NFR SPEC: 59 %
CD3 CELLS NFR SPEC: 32 %
CD3+CD4+ CELLS NFR SPEC: 13 %
CD3+CD4+ CELLS/CD3+CD8+ CLL SPEC: 0.7
CD3+CD8+ CELLS NFR SPEC: 19 %
CD38 CELLS NFR SPEC: 3 %
CD45 CELLS NFR SPEC: 100 %
CD5 CELLS NFR SPEC: 77 %
CD5/CD19 CELLS: 50 %
CD56 CELLS NFR SPEC: 2 %
CD7 CELLS NFR SPEC: 29 %
CELL SURF KAPPA/LAMBDA RATIO: 5.5
CELL SURF LAMBDA LIGHT CHAIN: 2 %
CELL SURFACE KAPPA LIGHT CHAIN: 11 %
CYTOPLASMIC KAPPA CELLS: 88 %
CYTOPLASMIC LAMBDA CELLS: 1 %
FMC7 CELLS NFR SPEC: 6 %

## 2020-07-17 ENCOUNTER — TELEPHONE (OUTPATIENT)
Dept: HEMATOLOGY/ONCOLOGY | Facility: HOSPITAL | Age: 75
End: 2020-07-17

## 2020-07-17 NOTE — TELEPHONE ENCOUNTER
Patient is calling to find out about some blood test that he does not understand and would like to discuss them with Dr. Alanna Devi. I called them and explained what the flow cytometry meant.  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

## 2020-07-29 ENCOUNTER — HOSPITAL ENCOUNTER (OUTPATIENT)
Dept: CT IMAGING | Facility: HOSPITAL | Age: 75
Discharge: HOME | End: 2020-07-29
Attending: INTERNAL MEDICINE
Payer: MEDICARE

## 2020-07-29 DIAGNOSIS — C34.81 MALIGNANT NEOPLASM OF OVERLAPPING SITES OF RIGHT LUNG (HCC): ICD-10-CM

## 2020-07-29 PROCEDURE — 74177 CT ABD & PELVIS W/CONTRAST: CPT | Performed by: INTERNAL MEDICINE

## 2020-07-29 PROCEDURE — 71260 CT THORAX DX C+: CPT | Performed by: INTERNAL MEDICINE

## 2020-08-04 ENCOUNTER — OFFICE VISIT (OUTPATIENT)
Dept: HEMATOLOGY/ONCOLOGY | Age: 75
End: 2020-08-04
Attending: INTERNAL MEDICINE
Payer: MEDICARE

## 2020-08-04 VITALS
HEIGHT: 68.9 IN | RESPIRATION RATE: 18 BRPM | SYSTOLIC BLOOD PRESSURE: 148 MMHG | TEMPERATURE: 98 F | WEIGHT: 199.81 LBS | DIASTOLIC BLOOD PRESSURE: 78 MMHG | OXYGEN SATURATION: 98 % | HEART RATE: 62 BPM | BODY MASS INDEX: 29.59 KG/M2

## 2020-08-04 DIAGNOSIS — C78.7 LIVER METASTASES (HCC): ICD-10-CM

## 2020-08-04 DIAGNOSIS — R53.83 FATIGUE DUE TO TREATMENT: ICD-10-CM

## 2020-08-04 DIAGNOSIS — R59.0 HILAR ADENOPATHY: ICD-10-CM

## 2020-08-04 DIAGNOSIS — C34.81 MALIGNANT NEOPLASM OF OVERLAPPING SITES OF RIGHT LUNG (HCC): Primary | ICD-10-CM

## 2020-08-04 DIAGNOSIS — D72.820 MONOCLONAL B-CELL LYMPHOCYTOSIS: ICD-10-CM

## 2020-08-04 DIAGNOSIS — R59.9 ADENOPATHY: ICD-10-CM

## 2020-08-04 DIAGNOSIS — D72.820 LYMPHOCYTOSIS: ICD-10-CM

## 2020-08-04 LAB
ALBUMIN SERPL-MCNC: 4 G/DL (ref 3.4–5)
ALBUMIN/GLOB SERPL: 1.2 {RATIO} (ref 1–2)
ALP LIVER SERPL-CCNC: 81 U/L (ref 45–117)
ALT SERPL-CCNC: 17 U/L (ref 16–61)
ANION GAP SERPL CALC-SCNC: 5 MMOL/L (ref 0–18)
AST SERPL-CCNC: 14 U/L (ref 15–37)
BASOPHILS # BLD AUTO: 0.03 X10(3) UL (ref 0–0.2)
BASOPHILS NFR BLD AUTO: 0.3 %
BILIRUB SERPL-MCNC: 0.5 MG/DL (ref 0.1–2)
BUN BLD-MCNC: 22 MG/DL (ref 7–18)
BUN/CREAT SERPL: 21.2 (ref 10–20)
CALCIUM BLD-MCNC: 9.7 MG/DL (ref 8.5–10.1)
CEA SERPL-MCNC: 4.7 NG/ML (ref ?–5)
CHLORIDE SERPL-SCNC: 105 MMOL/L (ref 98–112)
CO2 SERPL-SCNC: 26 MMOL/L (ref 21–32)
CREAT BLD-MCNC: 1.04 MG/DL (ref 0.7–1.3)
DEPRECATED RDW RBC AUTO: 45.6 FL (ref 35.1–46.3)
EOSINOPHIL # BLD AUTO: 0.13 X10(3) UL (ref 0–0.7)
EOSINOPHIL NFR BLD AUTO: 1.5 %
ERYTHROCYTE [DISTWIDTH] IN BLOOD BY AUTOMATED COUNT: 14.3 % (ref 11–15)
GLOBULIN PLAS-MCNC: 3.4 G/DL (ref 2.8–4.4)
GLUCOSE BLD-MCNC: 195 MG/DL (ref 70–99)
HCT VFR BLD AUTO: 47.7 % (ref 39–53)
HGB BLD-MCNC: 15.8 G/DL (ref 13–17.5)
IMM GRANULOCYTES # BLD AUTO: 0.02 X10(3) UL (ref 0–1)
IMM GRANULOCYTES NFR BLD: 0.2 %
LDH SERPL L TO P-CCNC: 159 U/L
LYMPHOCYTES # BLD AUTO: 4.52 X10(3) UL (ref 1–4)
LYMPHOCYTES NFR BLD AUTO: 50.5 %
M PROTEIN MFR SERPL ELPH: 7.4 G/DL (ref 6.4–8.2)
MCH RBC QN AUTO: 28.7 PG (ref 26–34)
MCHC RBC AUTO-ENTMCNC: 33.1 G/DL (ref 31–37)
MCV RBC AUTO: 86.6 FL (ref 80–100)
MONOCYTES # BLD AUTO: 0.41 X10(3) UL (ref 0.1–1)
MONOCYTES NFR BLD AUTO: 4.6 %
NEUTROPHILS # BLD AUTO: 3.84 X10 (3) UL (ref 1.5–7.7)
NEUTROPHILS # BLD AUTO: 3.84 X10(3) UL (ref 1.5–7.7)
NEUTROPHILS NFR BLD AUTO: 42.9 %
OSMOLALITY SERPL CALC.SUM OF ELEC: 291 MOSM/KG (ref 275–295)
PATIENT FASTING Y/N/NP: NO
PLATELET # BLD AUTO: 228 10(3)UL (ref 150–450)
POTASSIUM SERPL-SCNC: 4.3 MMOL/L (ref 3.5–5.1)
RBC # BLD AUTO: 5.51 X10(6)UL (ref 3.8–5.8)
SODIUM SERPL-SCNC: 136 MMOL/L (ref 136–145)
TSI SER-ACNC: 1.9 MIU/ML (ref 0.36–3.74)
WBC # BLD AUTO: 9 X10(3) UL (ref 4–11)

## 2020-08-04 PROCEDURE — 96375 TX/PRO/DX INJ NEW DRUG ADDON: CPT

## 2020-08-04 PROCEDURE — 96417 CHEMO IV INFUS EACH ADDL SEQ: CPT

## 2020-08-04 PROCEDURE — 99215 OFFICE O/P EST HI 40 MIN: CPT | Performed by: INTERNAL MEDICINE

## 2020-08-04 PROCEDURE — 96413 CHEMO IV INFUSION 1 HR: CPT

## 2020-08-04 NOTE — PROGRESS NOTES
Pt here for 3 week MD f/u and due for Keytruda. Energy level is fair, appetite is good. Denies pain. No bowel issues. Pt notes feeling bloated in stomach area only, has been for about 3 weeks. Pt notes feeling more irritable. Pt has no further complaints.

## 2020-08-05 ENCOUNTER — TELEPHONE (OUTPATIENT)
Dept: HEMATOLOGY/ONCOLOGY | Facility: HOSPITAL | Age: 75
End: 2020-08-05

## 2020-08-05 NOTE — TELEPHONE ENCOUNTER
Toni Galeazzi called confused because normally Toni Galeazzi gets treatment every 3 weeks, but is scheduled for 8/11. She says he is also scheduled to see Rufino Tatum and for treatment 8/26, and would like to know why he will not be seeing Teresa Andersen.

## 2020-08-06 NOTE — PROGRESS NOTES
Cancer Center Progress Note    Patient Name: Nadege Vidal   YOB: 1945   Medical Record Number: [id number]   CSN: [id number]   Attending Physician: Lou Wolfe M.D.    Referring Physician: Lizette Solares Energy level is fair. Has been active and golfs. Eats well. Has some CHAHAL and rare cough. Denies chest pain, change in his bowel or urinary habits, headaches, dizziness or visual symptoms. No hoarseness or hemoptysis.   No fevers or recent sick contacts Rfl:     Past Medical History:  Past Medical History:   Diagnosis Date   • Arthritis several years   • Atherosclerosis of coronary artery    • Back pain several years   • Cancer [facility])    • Chest pain on exertion on occasion   • [facility] 19 y status:       Spouse name: Not on file      Number of children: Not on file      Years of education: Not on file      Highest education level: Not on file    Occupational History      Not on file    Social Needs      Financial resource strain: Not o oriented x 3, not in acute distress. Psych:  Mood and affect appropriate  HEENT: EOMs intact. PERRL. Oropharynx is clear. Neck: No JVD. No palpable lymphadenopathy. Neck is supple. Lymphatics:  There is no palpable lymphadenopathy   Chest: Clear to ausc CD45 and side scatter characteristics, the flow cytometry sample was found to contain approximately:     32%CD3+T cells  62%CD19+B cells  2 %NK cells (CD3-/CD16+CD56+)        FLOW CYTOMETRY RESULTS   INTERPRETATION:     T cells show no significant antigen USED:  100cc of Omnipaque 350     FINDINGS:       CHEST:    LUNGS:  There is a linear focus of scarring within the medial aspect of the superior segment of the left lower lobe, unchanged from prior imaging. No new pulmonary nodules or masses are noted.   [place] adenopathy. However on the current exam this appears to represent the duodenum and likely represented bowel activity on the prior PET/CT as opposed   to a enlarged lymph node in this region.   There is however no additional enlarged lymph node along the bi 2/27/2020,   2:53 PM.     INDICATIONS:  C34.81 Malignant neoplasm of overlapping sites of right bronchus and lung     TECHNIQUE:  The patient fasted for at least 6 hours.  F-18 FDG was injected IV, and whole-body images from vertex to mid-thigh were obtaine renal calculus. CONCLUSION:    1. Favorable response to therapy with resolution of abnormal activity in the right hilum and left lower lobe with corresponding regression of masses on CT scanning.   2. Favorable response to therapy with resolution of hep granulomata throughout the liver and spleen. Enlarged adenopathy in the peripancreatic region not significantly changed  BONES:  Anterior bridging osteophytes involving the thoracic spine.   Postsurgical changes median sternotomy.     =====  CONCLUSION:  N compared   with 3.0 x 3.9 cm previously. Mild atelectasis and scarring is present of the medial lung bases bilaterally. MEDIASTINUM:  There are a few calcified lymph nodes present throughout the mediastinum.   These have an overall stable appearance when repair of the anterior upper abdomen.   BONES:  Moderate multilevel degenerative changes of the visualized thoracic and lumbar spine noted.     =====  CONCLUSION:  There has been interval decrease in size of lesions present within the right hilum and left l suspicious for malignancy. There is a cluster of smaller nodules in the medial and superior corner of the superior segment left  lower lobe, largest of which measures 2.0 x 1.2 cm and maximum SUV of 7.1.  These lesions may be  metastases or represent addit hepatic lesions suggest metastases. 5. Bladder calculus. DATE OF SERVICE: 12.11.2019       Impression and Plan:  1. Lung cancer- on Pembro which he has been tolerating well w/o overt irAEs. Recent CT reviewed with him and his wife.  Stable findings comp

## 2020-08-11 ENCOUNTER — APPOINTMENT (OUTPATIENT)
Dept: HEMATOLOGY/ONCOLOGY | Facility: HOSPITAL | Age: 75
End: 2020-08-11
Attending: INTERNAL MEDICINE
Payer: MEDICARE

## 2020-08-26 ENCOUNTER — OFFICE VISIT (OUTPATIENT)
Dept: HEMATOLOGY/ONCOLOGY | Facility: HOSPITAL | Age: 75
End: 2020-08-26
Attending: INTERNAL MEDICINE
Payer: MEDICARE

## 2020-08-26 VITALS
DIASTOLIC BLOOD PRESSURE: 69 MMHG | WEIGHT: 198.81 LBS | RESPIRATION RATE: 16 BRPM | OXYGEN SATURATION: 97 % | HEART RATE: 64 BPM | SYSTOLIC BLOOD PRESSURE: 139 MMHG | TEMPERATURE: 98 F | BODY MASS INDEX: 29.45 KG/M2 | HEIGHT: 68.9 IN

## 2020-08-26 DIAGNOSIS — R14.0 ABDOMINAL BLOATING: ICD-10-CM

## 2020-08-26 DIAGNOSIS — Z51.12 ENCOUNTER FOR ANTINEOPLASTIC IMMUNOTHERAPY: ICD-10-CM

## 2020-08-26 DIAGNOSIS — R26.89 BALANCE PROBLEM: ICD-10-CM

## 2020-08-26 DIAGNOSIS — R51.9 FOREHEAD PAIN: ICD-10-CM

## 2020-08-26 DIAGNOSIS — C34.81 MALIGNANT NEOPLASM OF OVERLAPPING SITES OF RIGHT LUNG (HCC): Primary | ICD-10-CM

## 2020-08-26 PROCEDURE — 99215 OFFICE O/P EST HI 40 MIN: CPT | Performed by: CLINICAL NURSE SPECIALIST

## 2020-08-26 PROCEDURE — 96413 CHEMO IV INFUSION 1 HR: CPT

## 2020-08-26 RX ORDER — REVEFENACIN 175 UG/3ML
SOLUTION RESPIRATORY (INHALATION)
COMMUNITY
Start: 2020-08-21 | End: 2021-12-22

## 2020-08-26 NOTE — PROGRESS NOTES
Pt here for C1D1 (not new treatment, just updated treatment plan).   Arrives Ambulating independently, accompanied by Self           Patient reports possible pregnancy since last therapy cycle: Not Applicable    Modifications in dose or schedule: No     Shashi

## 2020-08-26 NOTE — PROGRESS NOTES
Patient presents with: Follow - Up: APN assessment prior to treatment    Pt is here for treatment; maintenance Tony Pilling is expected. Pt states he is tolerating treatment well; denies N,V,D,C. Does note intermittent headache/pain in left temple 1-2/week.  H

## 2020-08-26 NOTE — PROGRESS NOTES
Cancer Center Progress Note    Patient Name: Lynette Hodgson   YOB: 1945   Medical Record Number: [id number]   CSN: [id number]/   Date of visit: 8/26/2020  Provider: MARKUS Rivero  Referring Physician: No ref.  provider found    Proble the right hilum. Subsequent CT chest was done which showed a new mass (compared to CT in 2016) along the right hilar area extending along the right major fissure, superior aspect of the RLL and posterior segment of the RUL measuring 4.8 x 3.9 x 3 cm.  There by Grecia Francis MD on 12/12/2019 at 8424      Final Diagnosis Comment    Immunostains performed show the tumor to be positive for Cytokeratin AE1/3,  P40, and focally for Cytokeratin 7; supporting the above diagnosis. A lung primary is favored.  The Use - Resp Rate: --  SpO2: 97 % (08/26 0900)        Medications:    Current Outpatient Medications:   •  GLIPIZIDE ER 10 MG Oral Tablet 24 Hr, TAKE 1 TABLET TWICE A DAY, Disp: 180 tablet, Rfl: 1  •  Empagliflozin (JARDIANCE) 10 MG Oral Tab, Take 10 mg by m sclerae clear, no sinus tenderness, no oropharyngeal lesion/thrush, mucous membranes are moist.  Neck:  Supple, no tenderness, no masses or adenopathy  Lungs:  Clear to auscultation bilaterally  CV:  Regular rate and rhythm  Abdomen:  Well healed scar, no 35.1 - 46.3 fL    Neutrophil Absolute Prelim 4.22 1.50 - 7.70 x10 (3) uL    Neutrophil Absolute 4.22 1.50 - 7.70 x10(3) uL    Lymphocyte Absolute 4.80 (H) 1.00 - 4.00 x10(3) uL    Monocyte Absolute 0.40 0.10 - 1.00 x10(3) uL    Eosinophil Absolute 0.17 0.0

## 2020-08-27 ENCOUNTER — HOSPITAL ENCOUNTER (OUTPATIENT)
Dept: GENERAL RADIOLOGY | Facility: HOSPITAL | Age: 75
Discharge: HOME | End: 2020-08-27
Attending: CLINICAL NURSE SPECIALIST
Payer: MEDICARE

## 2020-08-27 DIAGNOSIS — C34.81 MALIGNANT NEOPLASM OF OVERLAPPING SITES OF RIGHT LUNG (HCC): ICD-10-CM

## 2020-08-27 PROCEDURE — 74018 RADEX ABDOMEN 1 VIEW: CPT | Performed by: CLINICAL NURSE SPECIALIST

## 2020-09-16 ENCOUNTER — TELEPHONE (OUTPATIENT)
Dept: CARDIOLOGY | Age: 75
End: 2020-09-16

## 2020-09-16 ENCOUNTER — OFFICE VISIT (OUTPATIENT)
Dept: HEMATOLOGY/ONCOLOGY | Facility: HOSPITAL | Age: 75
End: 2020-09-16
Attending: INTERNAL MEDICINE
Payer: MEDICARE

## 2020-09-16 VITALS
WEIGHT: 198.38 LBS | DIASTOLIC BLOOD PRESSURE: 72 MMHG | OXYGEN SATURATION: 96 % | BODY MASS INDEX: 29.38 KG/M2 | TEMPERATURE: 98 F | HEART RATE: 74 BPM | HEIGHT: 68.9 IN | RESPIRATION RATE: 16 BRPM | SYSTOLIC BLOOD PRESSURE: 142 MMHG

## 2020-09-16 DIAGNOSIS — I25.10 CORONARY ARTERY DISEASE INVOLVING NATIVE CORONARY ARTERY OF NATIVE HEART WITHOUT ANGINA PECTORIS: Primary | ICD-10-CM

## 2020-09-16 DIAGNOSIS — C78.7 LIVER METASTASES (HCC): ICD-10-CM

## 2020-09-16 DIAGNOSIS — C34.81 MALIGNANT NEOPLASM OF OVERLAPPING SITES OF RIGHT LUNG (HCC): ICD-10-CM

## 2020-09-16 DIAGNOSIS — R53.83 FATIGUE DUE TO TREATMENT: ICD-10-CM

## 2020-09-16 DIAGNOSIS — D72.820 LYMPHOCYTOSIS: ICD-10-CM

## 2020-09-16 DIAGNOSIS — R59.9 ADENOPATHY: ICD-10-CM

## 2020-09-16 DIAGNOSIS — C34.81 MALIGNANT NEOPLASM OF OVERLAPPING SITES OF RIGHT LUNG (HCC): Primary | ICD-10-CM

## 2020-09-16 DIAGNOSIS — D72.820 MONOCLONAL B-CELL LYMPHOCYTOSIS: ICD-10-CM

## 2020-09-16 DIAGNOSIS — R59.0 HILAR ADENOPATHY: ICD-10-CM

## 2020-09-16 DIAGNOSIS — R14.0 ABDOMINAL BLOATING: Primary | ICD-10-CM

## 2020-09-16 DIAGNOSIS — E78.00 PURE HYPERCHOLESTEROLEMIA: ICD-10-CM

## 2020-09-16 LAB
ALBUMIN SERPL-MCNC: 3.7 G/DL (ref 3.4–5)
ALBUMIN/GLOB SERPL: 1.2 {RATIO} (ref 1–2)
ALP LIVER SERPL-CCNC: 101 U/L (ref 45–117)
ALT SERPL-CCNC: 17 U/L (ref 16–61)
ANION GAP SERPL CALC-SCNC: 7 MMOL/L (ref 0–18)
AST SERPL-CCNC: 13 U/L (ref 15–37)
BASOPHILS # BLD AUTO: 0.05 X10(3) UL (ref 0–0.2)
BASOPHILS NFR BLD AUTO: 0.5 %
BILIRUB SERPL-MCNC: 0.4 MG/DL (ref 0.1–2)
BUN BLD-MCNC: 22 MG/DL (ref 7–18)
BUN/CREAT SERPL: 19 (ref 10–20)
CALCIUM BLD-MCNC: 9.5 MG/DL (ref 8.5–10.1)
CEA SERPL-MCNC: 4.2 NG/ML (ref ?–5)
CHLORIDE SERPL-SCNC: 105 MMOL/L (ref 98–112)
CO2 SERPL-SCNC: 23 MMOL/L (ref 21–32)
CREAT BLD-MCNC: 1.16 MG/DL (ref 0.7–1.3)
DEPRECATED RDW RBC AUTO: 45 FL (ref 35.1–46.3)
EOSINOPHIL # BLD AUTO: 0.21 X10(3) UL (ref 0–0.7)
EOSINOPHIL NFR BLD AUTO: 2.1 %
ERYTHROCYTE [DISTWIDTH] IN BLOOD BY AUTOMATED COUNT: 14 % (ref 11–15)
GLOBULIN PLAS-MCNC: 3.1 G/DL (ref 2.8–4.4)
GLUCOSE BLD-MCNC: 232 MG/DL (ref 70–99)
HCT VFR BLD AUTO: 47.1 % (ref 39–53)
HGB BLD-MCNC: 15.6 G/DL (ref 13–17.5)
IMM GRANULOCYTES # BLD AUTO: 0.01 X10(3) UL (ref 0–1)
IMM GRANULOCYTES NFR BLD: 0.1 %
LYMPHOCYTES # BLD AUTO: 4.85 X10(3) UL (ref 1–4)
LYMPHOCYTES NFR BLD AUTO: 47.6 %
M PROTEIN MFR SERPL ELPH: 6.8 G/DL (ref 6.4–8.2)
MCH RBC QN AUTO: 29.2 PG (ref 26–34)
MCHC RBC AUTO-ENTMCNC: 33.1 G/DL (ref 31–37)
MCV RBC AUTO: 88.2 FL (ref 80–100)
MONOCYTES # BLD AUTO: 0.42 X10(3) UL (ref 0.1–1)
MONOCYTES NFR BLD AUTO: 4.1 %
NEUTROPHILS # BLD AUTO: 4.65 X10 (3) UL (ref 1.5–7.7)
NEUTROPHILS # BLD AUTO: 4.65 X10(3) UL (ref 1.5–7.7)
NEUTROPHILS NFR BLD AUTO: 45.6 %
OSMOLALITY SERPL CALC.SUM OF ELEC: 291 MOSM/KG (ref 275–295)
PLATELET # BLD AUTO: 211 10(3)UL (ref 150–450)
POTASSIUM SERPL-SCNC: 4.3 MMOL/L (ref 3.5–5.1)
RBC # BLD AUTO: 5.34 X10(6)UL (ref 3.8–5.8)
SODIUM SERPL-SCNC: 135 MMOL/L (ref 136–145)
WBC # BLD AUTO: 10.2 X10(3) UL (ref 4–11)

## 2020-09-16 PROCEDURE — 99215 OFFICE O/P EST HI 40 MIN: CPT | Performed by: INTERNAL MEDICINE

## 2020-09-16 PROCEDURE — 96413 CHEMO IV INFUSION 1 HR: CPT

## 2020-09-17 RX ORDER — ATORVASTATIN CALCIUM 10 MG/1
10 TABLET, FILM COATED ORAL DAILY
Qty: 90 TABLET | Refills: 0 | Status: SHIPPED | OUTPATIENT
Start: 2020-09-17 | End: 2020-10-29

## 2020-10-07 ENCOUNTER — OFFICE VISIT (OUTPATIENT)
Dept: HEMATOLOGY/ONCOLOGY | Facility: HOSPITAL | Age: 75
End: 2020-10-07
Attending: INTERNAL MEDICINE
Payer: MEDICARE

## 2020-10-07 VITALS
RESPIRATION RATE: 18 BRPM | SYSTOLIC BLOOD PRESSURE: 153 MMHG | DIASTOLIC BLOOD PRESSURE: 71 MMHG | WEIGHT: 196 LBS | OXYGEN SATURATION: 96 % | BODY MASS INDEX: 29.03 KG/M2 | HEIGHT: 68.9 IN | HEART RATE: 69 BPM | TEMPERATURE: 98 F

## 2020-10-07 DIAGNOSIS — R59.0 HILAR ADENOPATHY: ICD-10-CM

## 2020-10-07 DIAGNOSIS — C78.7 LIVER METASTASES (HCC): ICD-10-CM

## 2020-10-07 DIAGNOSIS — E78.00 PURE HYPERCHOLESTEROLEMIA: ICD-10-CM

## 2020-10-07 DIAGNOSIS — R59.9 ADENOPATHY: ICD-10-CM

## 2020-10-07 DIAGNOSIS — C34.81 MALIGNANT NEOPLASM OF OVERLAPPING SITES OF RIGHT LUNG (HCC): ICD-10-CM

## 2020-10-07 DIAGNOSIS — C34.81 MALIGNANT NEOPLASM OF OVERLAPPING SITES OF RIGHT LUNG (HCC): Primary | ICD-10-CM

## 2020-10-07 DIAGNOSIS — R53.83 FATIGUE DUE TO TREATMENT: ICD-10-CM

## 2020-10-07 DIAGNOSIS — D72.820 MONOCLONAL B-CELL LYMPHOCYTOSIS: ICD-10-CM

## 2020-10-07 DIAGNOSIS — R14.0 ABDOMINAL BLOATING: Primary | ICD-10-CM

## 2020-10-07 PROCEDURE — 99215 OFFICE O/P EST HI 40 MIN: CPT | Performed by: INTERNAL MEDICINE

## 2020-10-07 PROCEDURE — 96413 CHEMO IV INFUSION 1 HR: CPT

## 2020-10-07 NOTE — PROGRESS NOTES
Cancer Center Progress Note    Patient Name: Shazia Mullen   YOB: 1945   Medical Record Number: [id number]   CSN: [id number]   Attending Physician: Boston Kelly M.D.    Referring Physician: Christiano Greer Illness:  He says he is more tired recently. Still active and does his chores but at times when he does a lot he feels off balance and needs to take a break. Still with bloating and some intermittent abdominal pain that changes location. Appetite good.   H Succinate ER (TOPROL XL) 25 MG Oral Tablet 24 Hr, Take 1 tablet by mouth daily. , Disp: 90 tablet, Rfl: 4  •  omeprazole (PRILOSEC) 20 MG Oral Capsule Delayed Release, Take 20 mg by mouth every morning., Disp: , Rfl:     Past Medical History:  Past Medical OTHER SURGICAL HISTORY      pancreatic pins from biopsies   • TONSILLECTOMY         Family Medical History:   Mother with h/o cancer    Psychosocial History:  Social History    Socioeconomic History      Marital status:       Spouse name: Not on file Pulse 69   Temp 98.2 °F (36.8 °C) (Temporal)   Resp 18   Ht 1.75 m (5' 8.9\")   Wt 88.9 kg (196 lb)   SpO2 96%   BMI 29.03 kg/m²       Physical Examination:  PS 1  General: Patient is alert and oriented x 3, not in acute distress.   Psych:  Mood and affect HGB 15.8 13.0 - 17.5 g/dL    HCT 48.4 39.0 - 53.0 %    .0 150.0 - 450.0 10(3)uL    MCV 87.2 80.0 - 100.0 fL    MCH 28.5 26.0 - 34.0 pg    MCHC 32.6 31.0 - 37.0 g/dL    RDW 13.9 11.0 - 15.0 %    RDW-SD 44.3 35.1 - 46.3 fL    Neutrophil Absolute Preli its performance characteristics determined by Department of Pathology and Laboratory Medicine at [facility]. It has not been cleared or approved by the FDA.  The laboratory is regulated under CLIA as qualified to perform high-complexity testing.  Thi Registry. PATIENT STATED HISTORY:(As transcribed by Technologist)  The patient complaints of epigastric pain. The patient has lung cancer.       CONTRAST USED:  100cc of Omnipaque 350     FINDINGS:       CHEST:    LUNGS:  There is a linear focus of scar seen on the recent PET/CT scan. An adjacent focus of   increased radiotracer uptake was also seen slightly more medially concerning for an additional focus of adenopathy.   However on the current exam this appears to represent the duodenum and likely repre 12/03/2019, 10:10 AM.  [facility] , CT, CT CHEST+ABDOMEN (ALL CNTRST ONLY) (NVD=[id number]), 2/04/2020, 10:24 AM.  [facility] , CT, CT ANGIOGRAPHY, CHEST (CPT=71275), 2/27/2020,   2:53 PM.     INDICATIONS:  C34.81 Malignant neoplasm of overlapping sites of right pelvis. The previously demonstrated lesions in the liver on 12/3/2019 are not identified. Bladder calculi are noted. There is also a tiny nonobstructing left renal calculus. CONCLUSION:    1.  Favorable response to therapy with resolution of abnor stable again measuring 2.0 x 1.6 cm  CARDIAC:  No significant pericardial effusion. PLEURA:  No pneumothorax or effusion. LIMITED ABDOMEN:  Scattered calcified granulomata throughout the liver and spleen.   Enlarged adenopathy in the peripancreatic juanis spiculated lesion of the right hilar region extending into the right upper and lower lobes has decreased in prominence. This now measures approximately 3.0 x 1.7 cm compared   with 3.0 x 3.9 cm previously.   Mild atelectasis and scarring is present of the limits assessment. No dilated loops of small bowel are seen. No free fluid is seen. ABDOMINAL WALL:  Numerous postsurgical changes noted with presumed hernia repair of the anterior upper abdomen.   BONES:  Moderate multilevel degenerative changes of the foods that can increase flatulence. Trial probiotics? Proceed with Pembro today. Labs reviewed. PET ordered    RTC 3 weeks     Risk level: High- metastatic lung cancer on IO.        Emotional Well Being:  I have assessed the patient's emotional

## 2020-10-09 ENCOUNTER — E-ADVICE (OUTPATIENT)
Dept: CARDIOLOGY | Age: 75
End: 2020-10-09

## 2020-10-23 ENCOUNTER — HOSPITAL ENCOUNTER (OUTPATIENT)
Dept: NUCLEAR MEDICINE | Facility: HOSPITAL | Age: 75
Discharge: HOME | End: 2020-10-23
Attending: INTERNAL MEDICINE
Payer: MEDICARE

## 2020-10-23 ENCOUNTER — TELEPHONE (OUTPATIENT)
Dept: HEMATOLOGY/ONCOLOGY | Facility: HOSPITAL | Age: 75
End: 2020-10-23

## 2020-10-23 DIAGNOSIS — C34.81 MALIGNANT NEOPLASM OF OVERLAPPING SITES OF RIGHT LUNG (HCC): ICD-10-CM

## 2020-10-23 PROCEDURE — 78815 PET IMAGE W/CT SKULL-THIGH: CPT | Performed by: INTERNAL MEDICINE

## 2020-10-23 PROCEDURE — 82962 GLUCOSE BLOOD TEST: CPT

## 2020-10-23 NOTE — TELEPHONE ENCOUNTER
Reviewed with patient and wife LN were present last PET with a new one in paratracheal area. He has an appointment with Dr Itzel Champion on Wednesday and she will review in detail with him.

## 2020-10-27 DIAGNOSIS — C34.81 MALIGNANT NEOPLASM OF OVERLAPPING SITES OF RIGHT LUNG (HCC): Primary | ICD-10-CM

## 2020-10-28 ENCOUNTER — OFFICE VISIT (OUTPATIENT)
Dept: HEMATOLOGY/ONCOLOGY | Facility: HOSPITAL | Age: 75
End: 2020-10-28
Attending: INTERNAL MEDICINE
Payer: MEDICARE

## 2020-10-28 VITALS
TEMPERATURE: 97 F | HEART RATE: 78 BPM | OXYGEN SATURATION: 98 % | RESPIRATION RATE: 16 BRPM | WEIGHT: 197 LBS | HEIGHT: 68.9 IN | DIASTOLIC BLOOD PRESSURE: 76 MMHG | BODY MASS INDEX: 29.18 KG/M2 | SYSTOLIC BLOOD PRESSURE: 154 MMHG

## 2020-10-28 DIAGNOSIS — C34.81 MALIGNANT NEOPLASM OF OVERLAPPING SITES OF RIGHT LUNG (HCC): Primary | ICD-10-CM

## 2020-10-28 DIAGNOSIS — C78.7 LIVER METASTASES (HCC): ICD-10-CM

## 2020-10-28 DIAGNOSIS — R59.9 ADENOPATHY: ICD-10-CM

## 2020-10-28 DIAGNOSIS — C34.81 MALIGNANT NEOPLASM OF OVERLAPPING SITES OF RIGHT LUNG (HCC): ICD-10-CM

## 2020-10-28 DIAGNOSIS — R73.9 HYPERGLYCEMIA: ICD-10-CM

## 2020-10-28 DIAGNOSIS — D72.820 MONOCLONAL B-CELL LYMPHOCYTOSIS: ICD-10-CM

## 2020-10-28 DIAGNOSIS — R53.83 FATIGUE DUE TO TREATMENT: ICD-10-CM

## 2020-10-28 DIAGNOSIS — R59.0 HILAR ADENOPATHY: ICD-10-CM

## 2020-10-28 DIAGNOSIS — R14.0 ABDOMINAL BLOATING: Primary | ICD-10-CM

## 2020-10-28 PROCEDURE — 96413 CHEMO IV INFUSION 1 HR: CPT

## 2020-10-28 PROCEDURE — 99215 OFFICE O/P EST HI 40 MIN: CPT | Performed by: INTERNAL MEDICINE

## 2020-10-28 NOTE — PROGRESS NOTES
Pt here for C4D1 Pembrolizumab.   Arrives Ambulating independently, accompanied by Self           Patient reports possible pregnancy since last therapy cycle: Not Applicable    Modifications in dose or schedule: No     Frequency of blood return and site ricky

## 2020-10-28 NOTE — PROGRESS NOTES
Cancer Center Progress Note    Patient Name: Donny Godoy   YOB: 1945   Medical Record Number: [id number]   CSN: [id number]   Attending Physician: Tasneem Tadeo M.D.    Referring Physician: Naomi Calderon back to 2018. Levels overall stable. 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 History of Present Illness:  Says he is doing ok. Mild fatigue and occasional diarrhea. Still fairly active.  Didn't ), Disp: 90 tablet, Rfl: 4  •  Metoprolol Succinate ER (TOPROL XL) 25 MG Oral Tablet 24 Hr, Take 1 tablet by mouth daily. , Disp: 90 tablet, Rfl: 4  •  omeprazole (PRILOSEC) 20 MG Oral Capsule Delayed Release, Take 20 mg by mouth every morning., Disp: , Rfl SURGICAL HISTORY      FEET SURGERY   • OTHER SURGICAL HISTORY      pancreatic pins from biopsies   • TONSILLECTOMY         Family Medical History:   Mother with h/o cancer    Psychosocial History:  Social History    Socioeconomic History      Marital status Position: Sitting, Cuff Size: large)   Pulse 78   Temp 96.7 °F (35.9 °C) (Temporal)   Resp 16   Ht 1.75 m (5' 8.9\")   Wt 89.4 kg (197 lb)   SpO2 98%   BMI 29.18 kg/m²       Physical Examination:  PS 1  General: Patient is alert and oriented x 3, not in ac Component Value Date/Time     08/04/2020 1138           Radiology:  PROCEDURE:  PET/CT STANDARD BODY SCAN (ONCOLOGY) (CPT=78815)     COMPARISON:  [facility], PET/CT STANDARD BODY SCAN (ONCOLOGY) (CPT=78815), 5/26/2020, 2:57 PM.  [facility] , CT, CT C XR ABDOMEN (1 VIEW) (CPT=74018)     INDICATIONS:  Bloating C34.81 Malignant neoplasm of overlapping sites of right bronchus and lung. Bloating. COMPARISON:  None. TECHNIQUE:  Supine AP view was obtained.      PATIENT STATED HISTORY: (As transcribe or masses are noted. Mild diffuse emphysematous disease is stable. MEDIASTINUM:  Multiple shoddy mediastinal lymph nodes are stable. No jamie mediastinal adenopathy.   KT:  Right hilar adenopathy and subcarinal adenopathy is again seen and appears impr lymph node along the bifurcation of the celiac trunk measuring 1.5 x 1.5 cm. BOWEL/MESENTERY:  No visible mass, obstruction, or bowel wall thickening. Normal appendix. Moderate retained feces throughout the colon diffusely.   No evidence of diverticuli mid-thigh were obtained with concurrent CT scan for both anatomic localization as well as attenuation correction.       RADIOPHARMACEUTICAL:    14.9 mCi F-18 FDG  FASTING GLUCOSE LEVEL:  127 mg/dl  INJECTION TIME:         1320 hours  SCAN TIME: with resolution of hepatic lesions seen previously only on PET scan. 3. Development of new hypermetabolic upper abdominal retroperitoneal lymph nodes. 4. Bladder calculi and nonobstructing left renal calculus.            Dictated by: Luis Mcgregor MD on 5 =====  CONCLUSION:  No evidence of pulmonary embolism. Interval decrease in size of right hilar spiculated mass and nodule in the left lower lobe when compared to 2/4/2020. Tiny 3 mm endobronchial filling defect in the right mainstem bronchus. stable appearance when compared to the prior exam.  KT:  As above.   One additional nodular area of the right hilum with partial calcification measures up to 1.6 x 2.0 cm, overall stable from the prior exam.  CARDIAC:  No enlargement, pericardial thickeni right hilum and left lower lobe region. Continued follow-up is suggested. There is a mildly enlarged retroperitoneal lymph node in the region of the danis hepatis, however this is unchanged dating back to 2014.        There is increased prominence of t Oncology Group

## 2020-10-29 RX ORDER — METOPROLOL SUCCINATE 25 MG/1
TABLET, EXTENDED RELEASE ORAL
Qty: 270 TABLET | Refills: 3 | Status: SHIPPED | OUTPATIENT
Start: 2020-10-29

## 2020-10-29 RX ORDER — ATORVASTATIN CALCIUM 10 MG/1
TABLET, FILM COATED ORAL
Qty: 90 TABLET | Refills: 3 | Status: SHIPPED | OUTPATIENT
Start: 2020-10-29

## 2020-11-18 ENCOUNTER — OFFICE VISIT (OUTPATIENT)
Dept: HEMATOLOGY/ONCOLOGY | Facility: HOSPITAL | Age: 75
End: 2020-11-18
Attending: INTERNAL MEDICINE
Payer: MEDICARE

## 2020-11-18 VITALS
RESPIRATION RATE: 16 BRPM | WEIGHT: 196 LBS | TEMPERATURE: 98 F | DIASTOLIC BLOOD PRESSURE: 77 MMHG | HEIGHT: 68.9 IN | OXYGEN SATURATION: 99 % | SYSTOLIC BLOOD PRESSURE: 162 MMHG | BODY MASS INDEX: 29.03 KG/M2 | HEART RATE: 58 BPM

## 2020-11-18 DIAGNOSIS — R14.0 ABDOMINAL BLOATING: Primary | ICD-10-CM

## 2020-11-18 DIAGNOSIS — R53.83 FATIGUE DUE TO TREATMENT: ICD-10-CM

## 2020-11-18 DIAGNOSIS — C78.7 LIVER METASTASES (HCC): ICD-10-CM

## 2020-11-18 DIAGNOSIS — C34.81 MALIGNANT NEOPLASM OF OVERLAPPING SITES OF RIGHT LUNG (HCC): ICD-10-CM

## 2020-11-18 DIAGNOSIS — R59.9 ADENOPATHY: ICD-10-CM

## 2020-11-18 DIAGNOSIS — D72.820 MONOCLONAL B-CELL LYMPHOCYTOSIS: ICD-10-CM

## 2020-11-18 DIAGNOSIS — C34.81 MALIGNANT NEOPLASM OF OVERLAPPING SITES OF RIGHT LUNG (HCC): Primary | ICD-10-CM

## 2020-11-18 DIAGNOSIS — R59.0 HILAR ADENOPATHY: ICD-10-CM

## 2020-11-18 PROCEDURE — 99215 OFFICE O/P EST HI 40 MIN: CPT | Performed by: INTERNAL MEDICINE

## 2020-11-18 PROCEDURE — 96413 CHEMO IV INFUSION 1 HR: CPT

## 2020-11-18 NOTE — PROGRESS NOTES
Cancer Center Progress Note    Patient Name: Dolly Avendano   YOB: 1945   Medical Record Number: [id number]   CSN: [id number]   Attending Physician: Emperatriz Laurent M.D.    Referring Physician: Zebedee Poche Dr. Johnell Binet Dr. Kelvin Spatz stable. 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 History of Present Illness:  Reports doing about the same. Fatigued and does not tolerate as much activity as before.  Bloating but no diarrhea r XL) 25 MG Oral Tablet 24 Hr, Take 1 tablet by mouth daily. , Disp: 90 tablet, Rfl: 4  •  omeprazole (PRILOSEC) 20 MG Oral Capsule Delayed Release, Take 20 mg by mouth every morning., Disp: , Rfl:     Past Medical History:  Past Medical History:   Diagnosis pancreatic pins from biopsies   • TONSILLECTOMY         Family Medical History:   Mother with h/o cancer    Psychosocial History:  Social History    Socioeconomic History      Marital status:       Spouse name: Not on file      Number of children 97.5 °F (36.4 °C) (Temporal)   Resp 16   Ht 1.75 m (5' 8.9\")   Wt 88.9 kg (196 lb)   SpO2 99%   BMI 29.03 kg/m²       Physical Examination:  PS 1  General: Patient is alert and oriented x 3, not in acute distress.   Psych:  Mood and affect appropriate  DASHAWN HGB 16.0 13.0 - 17.5 g/dL    HCT 48.5 39.0 - 53.0 %    .0 150.0 - 450.0 10(3)uL    MCV 86.6 80.0 - 100.0 fL    MCH 28.6 26.0 - 34.0 pg    MCHC 33.0 31.0 - 37.0 g/dL    RDW 13.2 11.0 - 15.0 %    RDW-SD 41.2 35.1 - 46.3 fL    Neutrophil Absolute Pr the origin of the SMA measuring 15 SUV more inferiorly aortic caval lymph node have maximum SUV of 13 and 8.1. These   lymph nodes have enlarged and increase in FDG uptake since previous study.      Subtle FDG uptake is noted in the region of the left femo PET/CT STANDARD BODY SCAN (ONCOLOGY) (CPT=78815), 5/26/2020, 2:57 PM.     INDICATIONS:  C34.81 Malignant neoplasm of overlapping sites of right bronchus and lung     TECHNIQUE:  IV contrast-enhanced scanning through the chest, abdomen, and pelvis was perfo imaging. SPLEEN:  Multiple calcified granulomas of the spleen without significant interval change. KIDNEYS:  Simple nonenhancing left superior pole renal cyst measuring 1.3 cm. ADRENALS:  No mass or enlargement. AORTA:  No aneurysm or dissection. an enlarged lymph node as well as enlarged retroperitoneal lymph node along the celiac trunk bifurcation. 4. Please see further, less significant details above.         Dictated by (CST): Alexandria Rodriguez DO on 7/29/2020 at 11:04 AM       Finalized by (CST): area adjacent to surgical clips in the precaval region with artifact limiting measurement of this area. A lymph node at the   bifurcation of the celiac artery measures 1.3 x 1.3 cm and has a peak SUV of 4.35.   There is a small focus of abnormal FDG Jaime Garcia in the superior segment left lower lobe now measures 1.6 x 1.0 cm, previously 2.0 x 1.0 cm on 2/4/2020. Spiculated right hilar mass now measures 2.8 x 1 point 6 cm, previously 3.0 x 1.7 cm.   There is a small 3 mm endobronchial   filling defect in the righ Technologist)  Vandana Hare has a history of lung cancer. CONTRAST USED:  100cc of Omnipaque 350     FINDINGS:       CHEST:  LUNGS:  Central airways appear patent. Motion artifact slightly limits assessment of the lungs.   There has been interval decrease present. KIDNEYS:  Scattered parapelvic cysts present bilaterally. Cyst of the upper pole of the left kidney measures up to 1.3 x 1.3 cm which appears overall stable. ADRENALS:  Stable appearance. AORTA:  Mild calcific atherosclerosis noted.   RETROPERI for full body skin exams on a regular basis. 4. CAD s/p CABG and multiple PCI- continue current cardiac regimen. 5. MBL- Levels overall stable and no intervention required. We will continue to monitor this closely.      6. Hyponatremia- mild and like

## 2020-11-18 NOTE — PROGRESS NOTES
Pt here for C 5 D 1 keytruda.   Arrives Ambulating independently, accompanied by Self           Patient reports possible pregnancy since last therapy cycle: Not Applicable    Modifications in dose or schedule: No     Frequency of blood return and site check

## 2020-11-27 ENCOUNTER — TELEPHONE (OUTPATIENT)
Dept: CARDIOLOGY | Age: 75
End: 2020-11-27

## 2020-12-07 ENCOUNTER — OFFICE VISIT (OUTPATIENT)
Dept: CARDIOLOGY | Age: 75
End: 2020-12-07

## 2020-12-07 VITALS
HEART RATE: 65 BPM | SYSTOLIC BLOOD PRESSURE: 124 MMHG | WEIGHT: 199 LBS | DIASTOLIC BLOOD PRESSURE: 70 MMHG | HEIGHT: 70 IN | BODY MASS INDEX: 28.49 KG/M2

## 2020-12-07 DIAGNOSIS — E78.00 PURE HYPERCHOLESTEROLEMIA: ICD-10-CM

## 2020-12-07 DIAGNOSIS — I25.10 CORONARY ARTERY DISEASE INVOLVING NATIVE CORONARY ARTERY OF NATIVE HEART WITHOUT ANGINA PECTORIS: Primary | ICD-10-CM

## 2020-12-07 DIAGNOSIS — Z95.1 HX OF CABG: ICD-10-CM

## 2020-12-07 DIAGNOSIS — I25.810 ATHEROSCLEROSIS OF CORONARY ARTERY BYPASS GRAFT OF NATIVE HEART WITHOUT ANGINA PECTORIS: ICD-10-CM

## 2020-12-07 DIAGNOSIS — E11.9 TYPE 2 DIABETES MELLITUS WITHOUT COMPLICATION, UNSPECIFIED WHETHER LONG TERM INSULIN USE (CMD): ICD-10-CM

## 2020-12-07 PROBLEM — I10 ESSENTIAL HYPERTENSION: Status: ACTIVE | Noted: 2017-08-08

## 2020-12-07 PROCEDURE — 99441 TELEPHONE E&M BY PHYSICIAN EST PT NOT ORIG PREV 7 DAYS 5-10 MIN: CPT | Performed by: NURSE PRACTITIONER

## 2020-12-07 ASSESSMENT — ENCOUNTER SYMPTOMS
NEUROLOGICAL NEGATIVE: 1
WEIGHT GAIN: 0
DECREASED APPETITE: 0
GASTROINTESTINAL NEGATIVE: 1
WEIGHT LOSS: 0
SHORTNESS OF BREATH: 0
DIAPHORESIS: 0
SYNCOPE: 0

## 2020-12-08 DIAGNOSIS — C34.81 MALIGNANT NEOPLASM OF OVERLAPPING SITES OF RIGHT LUNG (HCC): Primary | ICD-10-CM

## 2020-12-09 ENCOUNTER — OFFICE VISIT (OUTPATIENT)
Dept: HEMATOLOGY/ONCOLOGY | Facility: HOSPITAL | Age: 75
End: 2020-12-09
Attending: INTERNAL MEDICINE
Payer: MEDICARE

## 2020-12-09 VITALS
WEIGHT: 199.38 LBS | DIASTOLIC BLOOD PRESSURE: 81 MMHG | OXYGEN SATURATION: 96 % | BODY MASS INDEX: 29.53 KG/M2 | RESPIRATION RATE: 18 BRPM | HEART RATE: 76 BPM | HEIGHT: 68.9 IN | TEMPERATURE: 98 F | SYSTOLIC BLOOD PRESSURE: 147 MMHG

## 2020-12-09 DIAGNOSIS — D72.820 MONOCLONAL B-CELL LYMPHOCYTOSIS: ICD-10-CM

## 2020-12-09 DIAGNOSIS — C34.81 MALIGNANT NEOPLASM OF OVERLAPPING SITES OF RIGHT LUNG (HCC): ICD-10-CM

## 2020-12-09 DIAGNOSIS — R59.0 HILAR ADENOPATHY: ICD-10-CM

## 2020-12-09 DIAGNOSIS — R14.0 ABDOMINAL BLOATING: Primary | ICD-10-CM

## 2020-12-09 DIAGNOSIS — C78.7 LIVER METASTASES (HCC): ICD-10-CM

## 2020-12-09 DIAGNOSIS — C34.81 MALIGNANT NEOPLASM OF OVERLAPPING SITES OF RIGHT LUNG (HCC): Primary | ICD-10-CM

## 2020-12-09 DIAGNOSIS — R59.9 ADENOPATHY: ICD-10-CM

## 2020-12-09 PROCEDURE — 96413 CHEMO IV INFUSION 1 HR: CPT

## 2020-12-09 PROCEDURE — 99215 OFFICE O/P EST HI 40 MIN: CPT | Performed by: INTERNAL MEDICINE

## 2020-12-09 NOTE — PROGRESS NOTES
Cancer Center Progress Note    Patient Name: Gokul Burger   YOB: 1945   Medical Record Number: [id number]   CSN: [id number]   Attending Physician: Maria Alejandra Thompson M.D.    Referring Physician: Renaye Bumpers Dr. Izella Conger Dr. Laura Purdue 2018. Levels overall stable. 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 History of Present Illness:  Feeling fairly well. No new complaints. Fatigued and active as much as he can tolerate.  Christina Shutter (Patient taking differently: Take 75 mg by mouth every evening.  ), Disp: 90 tablet, Rfl: 4  •  Metoprolol Succinate ER (TOPROL XL) 25 MG Oral Tablet 24 Hr, Take 1 tablet by mouth daily. , Disp: 90 tablet, Rfl: 4  •  omeprazole (PRILOSEC) 20 MG Oral Capsule knees replaced   • OTHER SURGICAL HISTORY      RHINOPLASTY    • OTHER SURGICAL HISTORY      FEET SURGERY   • OTHER SURGICAL HISTORY      pancreatic pins from biopsies   • TONSILLECTOMY         Family Medical History:   Mother with h/o cancer    Psychosocial per the HPI    Vital Signs:  /81   Pulse 76   Temp 98.1 °F (36.7 °C) (Temporal)   Resp 18   Ht 1.75 m (5' 8.9\")   Wt 90.4 kg (199 lb 6.4 oz)   SpO2 96%   BMI 29.53 kg/m²       Physical Examination:  PS 1  General: Patient is alert and oriented x 3, MCHC 31.9 31.0 - 37.0 g/dL    RDW 13.7 11.0 - 15.0 %    RDW-SD 44.3 35.1 - 46.3 fL    Neutrophil Absolute Prelim 4.93 1.50 - 7.70 x10 (3) uL     Lab Component   Component Value Date/Time    CEA 4.1 12/09/2020 0845     Lab Component   Component Value Date/T stable aortocaval and periaortic lymph nodes with elevated FDG uptake.               Dictated by (CST): Joby Boyd MD on 10/23/2020 at 2:50 PM       Finalized by (CST): Joby Boyd MD on 10/23/2020 at 3:17 PM       PROCEDURE:  XR ABDOMEN (1 VIEW) (CPT= USED:  100cc of Omnipaque 350     FINDINGS:       CHEST:    LUNGS:  There is a linear focus of scarring within the medial aspect of the superior segment of the left lower lobe, unchanged from prior imaging. No new pulmonary nodules or masses are noted.   Sulma Atkinson adenopathy. However on the current exam this appears to represent the duodenum and likely represented bowel activity on the prior PET/CT as opposed   to a enlarged lymph node in this region.   There is however no additional enlarged lymph node along the bi 2/27/2020,   2:53 PM.     INDICATIONS:  C34.81 Malignant neoplasm of overlapping sites of right bronchus and lung     TECHNIQUE:  The patient fasted for at least 6 hours.  F-18 FDG was injected IV, and whole-body images from vertex to mid-thigh were obtaine renal calculus. CONCLUSION:    1. Favorable response to therapy with resolution of abnormal activity in the right hilum and left lower lobe with corresponding regression of masses on CT scanning.   2. Favorable response to therapy with resolution of hep

## 2020-12-09 NOTE — PROGRESS NOTES
Pt here for Guicho Belcher.   Arrives Ambulating independently, accompanied by Self           Patient reports possible pregnancy since last therapy cycle: Not Applicable    Modifications in dose or schedule: No     Frequency of blood return and site check th

## 2020-12-30 ENCOUNTER — OFFICE VISIT (OUTPATIENT)
Dept: HEMATOLOGY/ONCOLOGY | Facility: HOSPITAL | Age: 75
End: 2020-12-30
Attending: INTERNAL MEDICINE
Payer: MEDICARE

## 2020-12-30 VITALS — HEART RATE: 78 BPM | SYSTOLIC BLOOD PRESSURE: 146 MMHG | DIASTOLIC BLOOD PRESSURE: 84 MMHG

## 2020-12-30 VITALS
RESPIRATION RATE: 18 BRPM | SYSTOLIC BLOOD PRESSURE: 179 MMHG | HEIGHT: 68.9 IN | HEART RATE: 73 BPM | OXYGEN SATURATION: 96 % | DIASTOLIC BLOOD PRESSURE: 80 MMHG | TEMPERATURE: 98 F | WEIGHT: 198.81 LBS | BODY MASS INDEX: 29.45 KG/M2

## 2020-12-30 DIAGNOSIS — C34.81 MALIGNANT NEOPLASM OF OVERLAPPING SITES OF RIGHT LUNG (HCC): Primary | ICD-10-CM

## 2020-12-30 DIAGNOSIS — C34.81 MALIGNANT NEOPLASM OF OVERLAPPING SITES OF RIGHT LUNG (HCC): ICD-10-CM

## 2020-12-30 DIAGNOSIS — C78.7 LIVER METASTASES (HCC): Primary | ICD-10-CM

## 2020-12-30 DIAGNOSIS — R59.0 HILAR ADENOPATHY: ICD-10-CM

## 2020-12-30 DIAGNOSIS — D72.820 MONOCLONAL B-CELL LYMPHOCYTOSIS: ICD-10-CM

## 2020-12-30 DIAGNOSIS — R53.83 FATIGUE DUE TO TREATMENT: ICD-10-CM

## 2020-12-30 PROCEDURE — 99215 OFFICE O/P EST HI 40 MIN: CPT | Performed by: INTERNAL MEDICINE

## 2020-12-30 PROCEDURE — 96413 CHEMO IV INFUSION 1 HR: CPT

## 2020-12-30 NOTE — PROGRESS NOTES
Cancer Center Progress Note    Patient Name: Vanessa Rosas   YOB: 1945   Medical Record Number: [id number]   CSN: [id number]   Attending Physician: Margo Dobson M.D.    Referring Physician: Surjit Knowles Levels overall stable. 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 History of Present Illness:  Feels the same. Has some fatigue and CHAHAL as before. GI symptoms- bloating etc. Seems to be improved. mouth daily. , Disp: 90 tablet, Rfl: 4  •  omeprazole (PRILOSEC) 20 MG Oral Capsule Delayed Release, Take 20 mg by mouth every morning., Disp: , Rfl:     Past Medical History:  Past Medical History:   Diagnosis Date   • Arthritis several years   • Atheroscl TONSILLECTOMY         Family Medical History:   Mother with h/o cancer    Psychosocial History:  Social History    Socioeconomic History      Marital status:       Spouse name: Not on file      Number of children: Not on file      Years of education: Ht 1.75 m (5' 8.9\")   Wt 90.2 kg (198 lb 12.8 oz)   SpO2 96%   BMI 29.44 kg/m²       Physical Examination:  PS 1  General: Patient is alert and oriented x 3, not in acute distress. Psych:  Mood and affect appropriate  HEENT: EOMs intact. PERRL.  Orophary 100.0 fL    MCH 28.7 26.0 - 34.0 pg    MCHC 32.8 31.0 - 37.0 g/dL    RDW 14.1 11.0 - 15.0 %    RDW-SD 44.8 35.1 - 46.3 fL    Neutrophil Absolute Prelim 4.64 1.50 - 7.70 x10 (3) uL    Neutrophil Absolute 4.64 1.50 - 7.70 x10(3) uL    Lymphocyte Absolute 5.3 8.1.  These   lymph nodes have enlarged and increase in FDG uptake since previous study.      Subtle FDG uptake is noted in the region of the left femoral neck without definitive CT correlate could represent degenerative changes this is stable since previou neoplasm of overlapping sites of right bronchus and lung     TECHNIQUE:  IV contrast-enhanced scanning through the chest, abdomen, and pelvis was performed. Dose reduction techniques were used.  Dose information is transmitted to the [address] Simple nonenhancing left superior pole renal cyst measuring 1.3 cm. ADRENALS:  No mass or enlargement. AORTA:  No aneurysm or dissection.     RETROPERITONEUM:  There is suggestion of increasing retroperitoneal adenopathy, with the largest lymph node se Please see further, less significant details above.         Dictated by (CST): Maryann Pinto DO on 7/29/2020 at 11:04 AM       Finalized by (CST): Maryann Pinto DO on 7/29/2020 at 11:24 AM       PROCEDURE:  PET/CT STANDARD BODY SCAN (ONCOLOGY) (JIN=19119) node at the   bifurcation of the celiac artery measures 1.3 x 1.3 cm and has a peak SUV of 4.35.   There is a small focus of abnormal FDG activity with a peak SUV of 6.68 associated with artifact from surgical clips in the aortocaval region below the superi visit- ordered. Prefers to stay on Q 3 week Pembro    Labs reviewed. RTC 3 weeks     Risk level: High- metastatic lung cancer on IO.        Agustina Meza MD  [address] [facility]

## 2021-01-01 ENCOUNTER — OFFICE VISIT (OUTPATIENT)
Dept: HEMATOLOGY/ONCOLOGY | Facility: HOSPITAL | Age: 76
End: 2021-01-01
Attending: INTERNAL MEDICINE
Payer: MEDICARE

## 2021-01-01 ENCOUNTER — TELEPHONE (OUTPATIENT)
Dept: HEMATOLOGY/ONCOLOGY | Facility: HOSPITAL | Age: 76
End: 2021-01-01

## 2021-01-01 VITALS
OXYGEN SATURATION: 99 % | SYSTOLIC BLOOD PRESSURE: 118 MMHG | HEART RATE: 66 BPM | RESPIRATION RATE: 18 BRPM | TEMPERATURE: 98 F | WEIGHT: 195 LBS | HEIGHT: 67.6 IN | BODY MASS INDEX: 29.9 KG/M2 | DIASTOLIC BLOOD PRESSURE: 68 MMHG

## 2021-01-01 DIAGNOSIS — C34.81 MALIGNANT NEOPLASM OF OVERLAPPING SITES OF RIGHT LUNG (HCC): Primary | ICD-10-CM

## 2021-01-01 LAB
ALBUMIN SERPL-MCNC: 3.1 G/DL (ref 3.4–5)
ALBUMIN/GLOB SERPL: 0.8 {RATIO} (ref 1–2)
ALP LIVER SERPL-CCNC: 77 U/L
ALT SERPL-CCNC: 17 U/L
ANION GAP SERPL CALC-SCNC: 6 MMOL/L (ref 0–18)
AST SERPL-CCNC: 16 U/L (ref 15–37)
BASOPHILS # BLD AUTO: 0.02 X10(3) UL (ref 0–0.2)
BASOPHILS NFR BLD AUTO: 0.3 %
BILIRUB SERPL-MCNC: 0.3 MG/DL (ref 0.1–2)
BUN BLD-MCNC: 27 MG/DL (ref 7–18)
CALCIUM BLD-MCNC: 9.1 MG/DL (ref 8.5–10.1)
CHLORIDE SERPL-SCNC: 103 MMOL/L (ref 98–112)
CO2 SERPL-SCNC: 25 MMOL/L (ref 21–32)
CREAT BLD-MCNC: 1.35 MG/DL
EOSINOPHIL # BLD AUTO: 0.04 X10(3) UL (ref 0–0.7)
EOSINOPHIL NFR BLD AUTO: 0.7 %
ERYTHROCYTE [DISTWIDTH] IN BLOOD BY AUTOMATED COUNT: 17.8 %
FASTING STATUS PATIENT QL REPORTED: NO
GLOBULIN PLAS-MCNC: 3.7 G/DL (ref 2.8–4.4)
GLUCOSE BLD-MCNC: 209 MG/DL (ref 70–99)
HCT VFR BLD AUTO: 32.9 %
HGB BLD-MCNC: 10.8 G/DL
IMM GRANULOCYTES # BLD AUTO: 0.01 X10(3) UL (ref 0–1)
IMM GRANULOCYTES NFR BLD: 0.2 %
LYMPHOCYTES # BLD AUTO: 2.99 X10(3) UL (ref 1–4)
LYMPHOCYTES NFR BLD AUTO: 49.9 %
MCH RBC QN AUTO: 30.3 PG (ref 26–34)
MCHC RBC AUTO-ENTMCNC: 32.8 G/DL (ref 31–37)
MCV RBC AUTO: 92.4 FL
MONOCYTES # BLD AUTO: 0.39 X10(3) UL (ref 0.1–1)
MONOCYTES NFR BLD AUTO: 6.5 %
NEUTROPHILS # BLD AUTO: 2.54 X10 (3) UL (ref 1.5–7.7)
NEUTROPHILS # BLD AUTO: 2.54 X10(3) UL (ref 1.5–7.7)
NEUTROPHILS NFR BLD AUTO: 42.4 %
OSMOLALITY SERPL CALC.SUM OF ELEC: 289 MOSM/KG (ref 275–295)
PLATELET # BLD AUTO: 322 10(3)UL (ref 150–450)
POTASSIUM SERPL-SCNC: 4.6 MMOL/L (ref 3.5–5.1)
PROT SERPL-MCNC: 6.8 G/DL (ref 6.4–8.2)
RBC # BLD AUTO: 3.56 X10(6)UL
SODIUM SERPL-SCNC: 134 MMOL/L (ref 136–145)
WBC # BLD AUTO: 6 X10(3) UL (ref 4–11)

## 2021-01-01 PROCEDURE — 85025 COMPLETE CBC W/AUTO DIFF WBC: CPT

## 2021-01-01 PROCEDURE — 36415 COLL VENOUS BLD VENIPUNCTURE: CPT

## 2021-01-01 PROCEDURE — 80053 COMPREHEN METABOLIC PANEL: CPT

## 2021-01-04 ENCOUNTER — DIETICIAN VISIT (OUTPATIENT)
Dept: HEMATOLOGY/ONCOLOGY | Facility: HOSPITAL | Age: 76
End: 2021-01-04

## 2021-01-04 NOTE — PROGRESS NOTES
Nutrition screen complete as triggered by [employer] dx of Stage IV NSCLC. Chart reviewed. Pt appears nutritionally stable at present. RD available on consult.

## 2021-01-15 RX ORDER — CLOPIDOGREL BISULFATE 75 MG/1
TABLET ORAL
Qty: 90 TABLET | Refills: 3 | Status: SHIPPED | OUTPATIENT
Start: 2021-01-15

## 2021-01-18 ENCOUNTER — HOSPITAL ENCOUNTER (OUTPATIENT)
Dept: CT IMAGING | Facility: HOSPITAL | Age: 76
Discharge: HOME | End: 2021-01-18
Attending: INTERNAL MEDICINE
Payer: MEDICARE

## 2021-01-18 DIAGNOSIS — C34.81 MALIGNANT NEOPLASM OF OVERLAPPING SITES OF RIGHT LUNG (HCC): ICD-10-CM

## 2021-01-18 LAB — CREAT BLD-MCNC: 1 MG/DL (ref 0.7–1.3)

## 2021-01-18 PROCEDURE — 74177 CT ABD & PELVIS W/CONTRAST: CPT | Performed by: INTERNAL MEDICINE

## 2021-01-18 PROCEDURE — 71260 CT THORAX DX C+: CPT | Performed by: INTERNAL MEDICINE

## 2021-01-18 PROCEDURE — 82565 ASSAY OF CREATININE: CPT

## 2021-01-20 ENCOUNTER — OFFICE VISIT (OUTPATIENT)
Dept: HEMATOLOGY/ONCOLOGY | Facility: HOSPITAL | Age: 76
End: 2021-01-20
Attending: INTERNAL MEDICINE
Payer: MEDICARE

## 2021-01-20 VITALS — SYSTOLIC BLOOD PRESSURE: 135 MMHG | DIASTOLIC BLOOD PRESSURE: 78 MMHG

## 2021-01-20 VITALS
HEART RATE: 61 BPM | HEIGHT: 68.9 IN | DIASTOLIC BLOOD PRESSURE: 75 MMHG | BODY MASS INDEX: 29.5 KG/M2 | WEIGHT: 199.19 LBS | OXYGEN SATURATION: 96 % | TEMPERATURE: 97 F | RESPIRATION RATE: 16 BRPM | SYSTOLIC BLOOD PRESSURE: 171 MMHG

## 2021-01-20 DIAGNOSIS — C78.7 LIVER METASTASES (HCC): Primary | ICD-10-CM

## 2021-01-20 DIAGNOSIS — R53.83 FATIGUE DUE TO TREATMENT: ICD-10-CM

## 2021-01-20 DIAGNOSIS — R59.0 HILAR ADENOPATHY: ICD-10-CM

## 2021-01-20 DIAGNOSIS — L98.9 SKIN LESIONS: ICD-10-CM

## 2021-01-20 DIAGNOSIS — C34.81 MALIGNANT NEOPLASM OF OVERLAPPING SITES OF RIGHT LUNG (HCC): Primary | ICD-10-CM

## 2021-01-20 DIAGNOSIS — E87.1 HYPONATREMIA: ICD-10-CM

## 2021-01-20 DIAGNOSIS — D72.820 LYMPHOCYTOSIS: ICD-10-CM

## 2021-01-20 DIAGNOSIS — R59.9 ADENOPATHY: ICD-10-CM

## 2021-01-20 DIAGNOSIS — D72.820 MONOCLONAL B-CELL LYMPHOCYTOSIS: ICD-10-CM

## 2021-01-20 DIAGNOSIS — C34.81 MALIGNANT NEOPLASM OF OVERLAPPING SITES OF RIGHT LUNG (HCC): ICD-10-CM

## 2021-01-20 LAB
ALBUMIN SERPL-MCNC: 3.5 G/DL (ref 3.4–5)
ALBUMIN/GLOB SERPL: 0.9 {RATIO} (ref 1–2)
ALP LIVER SERPL-CCNC: 105 U/L
ALT SERPL-CCNC: 21 U/L
ANION GAP SERPL CALC-SCNC: 5 MMOL/L (ref 0–18)
AST SERPL-CCNC: 16 U/L (ref 15–37)
BASOPHILS # BLD AUTO: 0.04 X10(3) UL (ref 0–0.2)
BASOPHILS NFR BLD AUTO: 0.4 %
BILIRUB SERPL-MCNC: 0.5 MG/DL (ref 0.1–2)
BUN BLD-MCNC: 26 MG/DL (ref 7–18)
BUN/CREAT SERPL: 21 (ref 10–20)
CALCIUM BLD-MCNC: 9.4 MG/DL (ref 8.5–10.1)
CEA SERPL-MCNC: 4.6 NG/ML (ref ?–5)
CHLORIDE SERPL-SCNC: 106 MMOL/L (ref 98–112)
CO2 SERPL-SCNC: 21 MMOL/L (ref 21–32)
CREAT BLD-MCNC: 1.24 MG/DL
DEPRECATED RDW RBC AUTO: 47.6 FL (ref 35.1–46.3)
EOSINOPHIL # BLD AUTO: 0.19 X10(3) UL (ref 0–0.7)
EOSINOPHIL NFR BLD AUTO: 1.8 %
ERYTHROCYTE [DISTWIDTH] IN BLOOD BY AUTOMATED COUNT: 14.2 % (ref 11–15)
GLOBULIN PLAS-MCNC: 4 G/DL (ref 2.8–4.4)
GLUCOSE BLD-MCNC: 277 MG/DL (ref 70–99)
HCT VFR BLD AUTO: 52.6 %
HGB BLD-MCNC: 16.3 G/DL
IMM GRANULOCYTES # BLD AUTO: 0.01 X10(3) UL (ref 0–1)
IMM GRANULOCYTES NFR BLD: 0.1 %
LYMPHOCYTES # BLD AUTO: 5.47 X10(3) UL (ref 1–4)
LYMPHOCYTES NFR BLD AUTO: 50.8 %
M PROTEIN MFR SERPL ELPH: 7.5 G/DL (ref 6.4–8.2)
MCH RBC QN AUTO: 28.6 PG (ref 26–34)
MCHC RBC AUTO-ENTMCNC: 31 G/DL (ref 31–37)
MCV RBC AUTO: 92.3 FL
MONOCYTES # BLD AUTO: 0.4 X10(3) UL (ref 0.1–1)
MONOCYTES NFR BLD AUTO: 3.7 %
NEUTROPHILS # BLD AUTO: 4.66 X10 (3) UL (ref 1.5–7.7)
NEUTROPHILS # BLD AUTO: 4.66 X10(3) UL (ref 1.5–7.7)
NEUTROPHILS NFR BLD AUTO: 43.2 %
OSMOLALITY SERPL CALC.SUM OF ELEC: 289 MOSM/KG (ref 275–295)
PATIENT FASTING Y/N/NP: NO
PLATELET # BLD AUTO: 250 10(3)UL (ref 150–450)
POTASSIUM SERPL-SCNC: 4.7 MMOL/L (ref 3.5–5.1)
RBC # BLD AUTO: 5.7 X10(6)UL
SODIUM SERPL-SCNC: 132 MMOL/L (ref 136–145)
WBC # BLD AUTO: 10.8 X10(3) UL (ref 4–11)

## 2021-01-20 PROCEDURE — 99215 OFFICE O/P EST HI 40 MIN: CPT | Performed by: INTERNAL MEDICINE

## 2021-01-20 PROCEDURE — 96413 CHEMO IV INFUSION 1 HR: CPT

## 2021-01-21 RX ORDER — ISOSORBIDE MONONITRATE 30 MG/1
30 TABLET, EXTENDED RELEASE ORAL DAILY
Qty: 90 TABLET | Refills: 3 | Status: SHIPPED | OUTPATIENT
Start: 2021-01-21

## 2021-01-21 RX ORDER — ISOSORBIDE MONONITRATE 30 MG/1
30 TABLET, EXTENDED RELEASE ORAL DAILY
Qty: 90 TABLET | Refills: 3 | OUTPATIENT
Start: 2021-01-21

## 2021-01-25 NOTE — PROGRESS NOTES
Cancer Center Progress Note    Patient Name: Lynette Hodgson   YOB: 1945   Medical Record Number: [id number]   CSN: [id number]   Attending Physician: Siri Velazquez M.D.    Referring Physician: Bertha BERRY      Date of Visit: 1/20/2021     Ch 12/27/19    2. Monoclonal B cell lymphocytosis     - was noted to have persistent mild lymphocytosis going back to 2018. Levels overall stable. 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        History Take 75 mg by mouth every evening.  ), Disp: 90 tablet, Rfl: 4  •  Metoprolol Succinate ER (TOPROL XL) 25 MG Oral Tablet 24 Hr, Take 1 tablet by mouth daily. , Disp: 90 tablet, Rfl: 4  •  omeprazole (PRILOSEC) 20 MG Oral Capsule Delayed Release, Take 20 mg HISTORY      RHINOPLASTY    • OTHER SURGICAL HISTORY      FEET SURGERY   • OTHER SURGICAL HISTORY      pancreatic pins from biopsies   • TONSILLECTOMY         Family Medical History:   Mother with h/o cancer    Psychosocial History:  Social History    Socio 171/75 (BP Location: Right arm, Patient Position: Sitting, Cuff Size: large)   Pulse 61   Temp 97.1 °F (36.2 °C) (Temporal)   Resp 16   Ht 1.75 m (5' 8.9\")   Wt 90.4 kg (199 lb 3.2 oz)   SpO2 96%   BMI 29.50 kg/m²       Physical Examination:  PS 1  Genera 21.0 01/20/2021     Lab Component   Component Value Date/Time    CEA 4.6 01/20/2021 0852     Lab Component   Component Value Date/Time     08/04/2020 1138         Radiology:  PROCEDURE:  CT CHEST+ABDOMEN+PELVIS(ALL CNTRST ONLY)(CPT=71260/04899) No lesion, fluid collection, ductal dilatation, or atrophy. SPLEEN:  No enlargement or focal lesion. Multiple small calcified granulomas. KIDNEYS:  No mass, obstruction, or calcification.   Stable small left renal cyst.  ADRENALS:  No mass or enlargeme this finding is therefore of doubtful significance. 4. Stable bladder calculus.         Dictated by (CST): Margot Durham MD on 1/18/2021 at 9:59 AM       Finalized by (CST): Margot Durham MD on 1/18/2021 at 10:33 AM       PROCEDURE:  PET/CT STANDARD BODY S Dictated by (CST): Flynn Rich MD on 10/23/2020 at 2:50 PM       Finalized by (CST): Flynn Rich MD on 10/23/2020 at 3:17 PM       PROCEDURE:  XR ABDOMEN (1 VIEW) (CPT=74018)     INDICATIONS:  Bloating C34.81 Malignant neoplasm of overlapping sites o focus of scarring within the medial aspect of the superior segment of the left lower lobe, unchanged from prior imaging. No new pulmonary nodules or masses are noted. Mild diffuse emphysematous disease is stable.   MEDIASTINUM:  Multiple shoddy mediastina likely represented bowel activity on the prior PET/CT as opposed   to a enlarged lymph node in this region. There is however no additional enlarged lymph node along the bifurcation of the celiac trunk measuring 1.5 x 1.5 cm.     BOWEL/MESENTERY:  No visibl sites of right bronchus and lung     TECHNIQUE:  The patient fasted for at least 6 hours.  F-18 FDG was injected IV, and whole-body images from vertex to mid-thigh were obtained with concurrent CT scan for both anatomic localization as well as attenuation c resolution of abnormal activity in the right hilum and left lower lobe with corresponding regression of masses on CT scanning. 2. Favorable response to therapy with resolution of hepatic lesions seen previously only on PET scan.   3. Development of new hyp

## 2021-01-26 DIAGNOSIS — Z23 NEED FOR VACCINATION: ICD-10-CM

## 2021-01-30 ENCOUNTER — IMMUNIZATION (OUTPATIENT)
Dept: LAB | Age: 76
End: 2021-01-30
Attending: HOSPITALIST
Payer: MEDICARE

## 2021-01-30 DIAGNOSIS — Z23 NEED FOR VACCINATION: Primary | ICD-10-CM

## 2021-01-30 PROCEDURE — 0001A SARSCOV2 VAC 30MCG/0.3ML IM: CPT

## 2021-02-10 ENCOUNTER — OFFICE VISIT (OUTPATIENT)
Dept: HEMATOLOGY/ONCOLOGY | Facility: HOSPITAL | Age: 76
End: 2021-02-10
Attending: INTERNAL MEDICINE
Payer: MEDICARE

## 2021-02-10 VITALS
HEART RATE: 64 BPM | TEMPERATURE: 97 F | DIASTOLIC BLOOD PRESSURE: 78 MMHG | HEIGHT: 68.9 IN | OXYGEN SATURATION: 97 % | SYSTOLIC BLOOD PRESSURE: 168 MMHG | BODY MASS INDEX: 29.53 KG/M2 | RESPIRATION RATE: 18 BRPM | WEIGHT: 199.38 LBS

## 2021-02-10 DIAGNOSIS — R59.0 HILAR ADENOPATHY: ICD-10-CM

## 2021-02-10 DIAGNOSIS — E23.6: ICD-10-CM

## 2021-02-10 DIAGNOSIS — C34.81 MALIGNANT NEOPLASM OF OVERLAPPING SITES OF RIGHT LUNG (HCC): Primary | ICD-10-CM

## 2021-02-10 DIAGNOSIS — E87.1 HYPONATREMIA: ICD-10-CM

## 2021-02-10 DIAGNOSIS — R53.83 FATIGUE DUE TO TREATMENT: ICD-10-CM

## 2021-02-10 DIAGNOSIS — Z51.11 ENCOUNTER FOR ANTINEOPLASTIC CHEMOTHERAPY: ICD-10-CM

## 2021-02-10 DIAGNOSIS — C78.7 LIVER METASTASES (HCC): ICD-10-CM

## 2021-02-10 DIAGNOSIS — R53.83 OTHER FATIGUE: ICD-10-CM

## 2021-02-10 DIAGNOSIS — D72.820 MONOCLONAL B-CELL LYMPHOCYTOSIS: ICD-10-CM

## 2021-02-10 LAB
ALBUMIN SERPL-MCNC: 3.6 G/DL (ref 3.4–5)
ALBUMIN/GLOB SERPL: 1 {RATIO} (ref 1–2)
ALP LIVER SERPL-CCNC: 85 U/L
ALT SERPL-CCNC: 20 U/L
ANION GAP SERPL CALC-SCNC: 5 MMOL/L (ref 0–18)
AST SERPL-CCNC: 11 U/L (ref 15–37)
BASOPHILS # BLD AUTO: 0.04 X10(3) UL (ref 0–0.2)
BASOPHILS NFR BLD AUTO: 0.3 %
BILIRUB SERPL-MCNC: 0.4 MG/DL (ref 0.1–2)
BUN BLD-MCNC: 26 MG/DL (ref 7–18)
BUN/CREAT SERPL: 23.2 (ref 10–20)
CALCIUM BLD-MCNC: 9.3 MG/DL (ref 8.5–10.1)
CEA SERPL-MCNC: 4.9 NG/ML (ref ?–5)
CHLORIDE SERPL-SCNC: 105 MMOL/L (ref 98–112)
CO2 SERPL-SCNC: 25 MMOL/L (ref 21–32)
CORTIS SERPL-MCNC: 15.5 UG/DL
CREAT BLD-MCNC: 1.12 MG/DL
DEPRECATED RDW RBC AUTO: 45.1 FL (ref 35.1–46.3)
EOSINOPHIL # BLD AUTO: 0.18 X10(3) UL (ref 0–0.7)
EOSINOPHIL NFR BLD AUTO: 1.5 %
ERYTHROCYTE [DISTWIDTH] IN BLOOD BY AUTOMATED COUNT: 14 % (ref 11–15)
FSH SERPL-ACNC: 18.7 MIU/ML
GLOBULIN PLAS-MCNC: 3.7 G/DL (ref 2.8–4.4)
GLUCOSE BLD-MCNC: 128 MG/DL (ref 70–99)
HCT VFR BLD AUTO: 49.5 %
HGB BLD-MCNC: 15.9 G/DL
IMM GRANULOCYTES # BLD AUTO: 0.03 X10(3) UL (ref 0–1)
IMM GRANULOCYTES NFR BLD: 0.3 %
LH SERPL-ACNC: 9.9 MIU/ML
LYMPHOCYTES # BLD AUTO: 5.07 X10(3) UL (ref 1–4)
LYMPHOCYTES NFR BLD AUTO: 42.4 %
M PROTEIN MFR SERPL ELPH: 7.3 G/DL (ref 6.4–8.2)
MCH RBC QN AUTO: 28.7 PG (ref 26–34)
MCHC RBC AUTO-ENTMCNC: 32.1 G/DL (ref 31–37)
MCV RBC AUTO: 89.4 FL
MONOCYTES # BLD AUTO: 0.43 X10(3) UL (ref 0.1–1)
MONOCYTES NFR BLD AUTO: 3.6 %
NEUTROPHILS # BLD AUTO: 6.2 X10 (3) UL (ref 1.5–7.7)
NEUTROPHILS # BLD AUTO: 6.2 X10(3) UL (ref 1.5–7.7)
NEUTROPHILS NFR BLD AUTO: 51.9 %
OSMOLALITY SERPL CALC.SUM OF ELEC: 286 MOSM/KG (ref 275–295)
PATIENT FASTING Y/N/NP: NO
PLATELET # BLD AUTO: 235 10(3)UL (ref 150–450)
POTASSIUM SERPL-SCNC: 4.5 MMOL/L (ref 3.5–5.1)
PROLACTIN SERPL-MCNC: 6.5 NG/ML
RBC # BLD AUTO: 5.54 X10(6)UL
SODIUM SERPL-SCNC: 135 MMOL/L (ref 136–145)
TSI SER-ACNC: 1.61 MIU/ML (ref 0.36–3.74)
VIT B12 SERPL-MCNC: 591 PG/ML (ref 193–986)
WBC # BLD AUTO: 12 X10(3) UL (ref 4–11)

## 2021-02-10 PROCEDURE — 96413 CHEMO IV INFUSION 1 HR: CPT

## 2021-02-10 PROCEDURE — 99215 OFFICE O/P EST HI 40 MIN: CPT | Performed by: INTERNAL MEDICINE

## 2021-02-10 NOTE — PROGRESS NOTES
Cancer Center Progress Note    Patient Name: Gucci Gastelum   YOB: 1945   Medical Record Number: [id number]   CSN: [id number]   Attending Physician: Adriana Heath M.D.    Referring Physician: DR Hilda BERRY      Date of Visit: 2/10/2021 2018. Levels overall stable. 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 History of Present Illness:  Feels he is more fatigued. With usual ADLs he has to stop and take a break.  Breathing is the s (TOPROL XL) 25 MG Oral Tablet 24 Hr, Take 1 tablet by mouth daily. , Disp: 90 tablet, Rfl: 4  •  omeprazole (PRILOSEC) 20 MG Oral Capsule Delayed Release, Take 20 mg by mouth every morning., Disp: , Rfl:     Past Medical History:  Past Medical History:   Dieudonne Epstein HISTORY      pancreatic pins from biopsies   • TONSILLECTOMY         Family Medical History:   Mother with h/o cancer    Psychosocial History:  Social History    Socioeconomic History      Marital status:       Spouse name: Not on file      Number of Temp 96.9 °F (36.1 °C) (Tympanic)   Resp 18   Ht 1.75 m (5' 8.9\")   Wt 90.4 kg (199 lb 6.4 oz)   SpO2 97%   BMI 29.53 kg/m²       Physical Examination:  PS 1  General: Patient is alert and oriented x 3, not in acute distress.  Scaly lesions with scabbing o HCT 49.5 39.0 - 53.0 %    .0 150.0 - 450.0 10(3)uL    MCV 89.4 80.0 - 100.0 fL    MCH 28.7 26.0 - 34.0 pg    MCHC 32.1 31.0 - 37.0 g/dL    RDW 14.0 11.0 - 15.0 %    RDW-SD 45.1 35.1 - 46.3 fL    Neutrophil Absolute Prelim 6.20 1.50 - 7.70 x10 (3) lymph node is grossly unchanged in size measuring at least 9 mm, beam hardening artifact from adjacent contrast in the superior vena cava limits precise measurement. Stable calcified subcarinal lymph node. No   enlarging or new lymphadenopathy.   KT:  S seen measuring up to 11 mm. No bladder wall thickening. Stable posterior bladder diverticulum. PELVIC NODES:  No adenopathy. PELVIC ORGANS:  No visible mass. Pelvic organs appropriate for patient age.     BONES:  Stable degenerative changes of spine, noted with aortocaval lymph node measuring 13.7 SUV and periaortic lymph node anterior to the aorta at the origin of the SMA measuring 15 SUV more inferiorly aortic caval lymph node have maximum SUV of 13 and 8.1.   These   lymph nodes have enlarged and inc scans showed some waxing and waning adenopathy- not unusual on IO. As mentioned in my previous notes rest of bulky primary findings not showing uptake on last PET (primary lung mass, lung nodules, liver mets).  Though fatigue most common side effect of IO w

## 2021-02-11 DIAGNOSIS — E23.6: Primary | ICD-10-CM

## 2021-02-11 LAB — TESTOST SERPL-MCNC: 317.45 NG/DL

## 2021-02-19 ENCOUNTER — HOSPITAL ENCOUNTER (OUTPATIENT)
Dept: CV DIAGNOSTICS | Facility: HOSPITAL | Age: 76
Discharge: HOME | End: 2021-02-19
Attending: INTERNAL MEDICINE
Payer: MEDICARE

## 2021-02-19 DIAGNOSIS — R53.83 OTHER FATIGUE: ICD-10-CM

## 2021-02-19 DIAGNOSIS — Z51.11 ENCOUNTER FOR ANTINEOPLASTIC CHEMOTHERAPY: ICD-10-CM

## 2021-02-19 DIAGNOSIS — C34.81 MALIGNANT NEOPLASM OF OVERLAPPING SITES OF RIGHT LUNG (HCC): ICD-10-CM

## 2021-02-19 PROCEDURE — 93306 TTE W/DOPPLER COMPLETE: CPT | Performed by: INTERNAL MEDICINE

## 2021-02-20 ENCOUNTER — IMMUNIZATION (OUTPATIENT)
Dept: LAB | Age: 76
End: 2021-02-20
Attending: HOSPITALIST
Payer: MEDICARE

## 2021-02-20 DIAGNOSIS — Z23 NEED FOR VACCINATION: Primary | ICD-10-CM

## 2021-02-20 PROCEDURE — 0002A SARSCOV2 VAC 30MCG/0.3ML IM: CPT

## 2021-03-03 ENCOUNTER — OFFICE VISIT (OUTPATIENT)
Dept: HEMATOLOGY/ONCOLOGY | Facility: HOSPITAL | Age: 76
End: 2021-03-03
Attending: INTERNAL MEDICINE
Payer: MEDICARE

## 2021-03-03 VITALS
BODY MASS INDEX: 29.33 KG/M2 | RESPIRATION RATE: 16 BRPM | TEMPERATURE: 96 F | HEIGHT: 68.9 IN | SYSTOLIC BLOOD PRESSURE: 147 MMHG | WEIGHT: 198 LBS | HEART RATE: 68 BPM | DIASTOLIC BLOOD PRESSURE: 69 MMHG | OXYGEN SATURATION: 96 %

## 2021-03-03 DIAGNOSIS — R53.83 FATIGUE DUE TO TREATMENT: ICD-10-CM

## 2021-03-03 DIAGNOSIS — C34.81 MALIGNANT NEOPLASM OF OVERLAPPING SITES OF RIGHT LUNG (HCC): Primary | ICD-10-CM

## 2021-03-03 DIAGNOSIS — E87.1 HYPONATREMIA: ICD-10-CM

## 2021-03-03 DIAGNOSIS — D72.820 LYMPHOCYTOSIS: ICD-10-CM

## 2021-03-03 DIAGNOSIS — R59.0 HILAR ADENOPATHY: ICD-10-CM

## 2021-03-03 DIAGNOSIS — C78.7 LIVER METASTASES (HCC): ICD-10-CM

## 2021-03-03 DIAGNOSIS — D72.820 MONOCLONAL B-CELL LYMPHOCYTOSIS: ICD-10-CM

## 2021-03-03 DIAGNOSIS — R59.9 ADENOPATHY: ICD-10-CM

## 2021-03-03 LAB
ALBUMIN SERPL-MCNC: 3.4 G/DL (ref 3.4–5)
ALBUMIN/GLOB SERPL: 0.9 {RATIO} (ref 1–2)
ALP LIVER SERPL-CCNC: 104 U/L
ALT SERPL-CCNC: 19 U/L
ANION GAP SERPL CALC-SCNC: 5 MMOL/L (ref 0–18)
AST SERPL-CCNC: 13 U/L (ref 15–37)
BASOPHILS # BLD AUTO: 0.04 X10(3) UL (ref 0–0.2)
BASOPHILS NFR BLD AUTO: 0.4 %
BILIRUB SERPL-MCNC: 0.3 MG/DL (ref 0.1–2)
BUN BLD-MCNC: 28 MG/DL (ref 7–18)
BUN/CREAT SERPL: 25.2 (ref 10–20)
CALCIUM BLD-MCNC: 9.4 MG/DL (ref 8.5–10.1)
CEA SERPL-MCNC: 4 NG/ML (ref ?–5)
CHLORIDE SERPL-SCNC: 106 MMOL/L (ref 98–112)
CO2 SERPL-SCNC: 25 MMOL/L (ref 21–32)
CREAT BLD-MCNC: 1.11 MG/DL
DEPRECATED RDW RBC AUTO: 46.5 FL (ref 35.1–46.3)
EOSINOPHIL # BLD AUTO: 0.23 X10(3) UL (ref 0–0.7)
EOSINOPHIL NFR BLD AUTO: 2.1 %
ERYTHROCYTE [DISTWIDTH] IN BLOOD BY AUTOMATED COUNT: 14.5 % (ref 11–15)
GLOBULIN PLAS-MCNC: 3.8 G/DL (ref 2.8–4.4)
GLUCOSE BLD-MCNC: 235 MG/DL (ref 70–99)
HCT VFR BLD AUTO: 48.3 %
HGB BLD-MCNC: 15.7 G/DL
IMM GRANULOCYTES # BLD AUTO: 0.02 X10(3) UL (ref 0–1)
IMM GRANULOCYTES NFR BLD: 0.2 %
LYMPHOCYTES # BLD AUTO: 4.99 X10(3) UL (ref 1–4)
LYMPHOCYTES NFR BLD AUTO: 46.5 %
M PROTEIN MFR SERPL ELPH: 7.2 G/DL (ref 6.4–8.2)
MCH RBC QN AUTO: 28.6 PG (ref 26–34)
MCHC RBC AUTO-ENTMCNC: 32.5 G/DL (ref 31–37)
MCV RBC AUTO: 88 FL
MONOCYTES # BLD AUTO: 0.41 X10(3) UL (ref 0.1–1)
MONOCYTES NFR BLD AUTO: 3.8 %
NEUTROPHILS # BLD AUTO: 5.03 X10 (3) UL (ref 1.5–7.7)
NEUTROPHILS # BLD AUTO: 5.03 X10(3) UL (ref 1.5–7.7)
NEUTROPHILS NFR BLD AUTO: 47 %
OSMOLALITY SERPL CALC.SUM OF ELEC: 295 MOSM/KG (ref 275–295)
PLATELET # BLD AUTO: 246 10(3)UL (ref 150–450)
POTASSIUM SERPL-SCNC: 4.4 MMOL/L (ref 3.5–5.1)
RBC # BLD AUTO: 5.49 X10(6)UL
SODIUM SERPL-SCNC: 136 MMOL/L (ref 136–145)
WBC # BLD AUTO: 10.7 X10(3) UL (ref 4–11)

## 2021-03-03 PROCEDURE — 96413 CHEMO IV INFUSION 1 HR: CPT

## 2021-03-03 PROCEDURE — 99215 OFFICE O/P EST HI 40 MIN: CPT | Performed by: INTERNAL MEDICINE

## 2021-03-03 NOTE — PROGRESS NOTES
Pt here for C10D1 Keytruda.   Arrives Ambulating independently, accompanied by Self           Patient reports possible pregnancy since last therapy cycle: Not Applicable    Modifications in dose or schedule: No     Frequency of blood return and site check t

## 2021-03-03 NOTE — PROGRESS NOTES
Cancer Center Progress Note    Patient Name: Lindsay Cedeño   YOB: 1945   Medical Record Number: [id number]   CSN: [id number]   Attending Physician: Bora Hannah M.D.    Referring Physician: Franci BERRY      Date of Visit: 3/3/2021     Chi cytometry showed presence of a monotypic B cell population. 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 History of Present Illness:  Doing about the same. Reports fatigue, CHAHAL and cough with PND. Appetite good.   Denies chest or abdominal pain, change in his uri omeprazole (PRILOSEC) 20 MG Oral Capsule Delayed Release, Take 20 mg by mouth every morning., Disp: , Rfl:     Past Medical History:  Past Medical History:   Diagnosis Date   • Arthritis several years   • Atherosclerosis of coronary artery    • Back pain s History:   Mother with h/o cancer    Psychosocial History:  Social History    Socioeconomic History      Marital status:       Spouse name: Not on file      Number of children: Not on file      Years of education: Not on file      Highest education l (198 lb)   SpO2 96%   BMI 29.33 kg/m²       Physical Examination:  PS 1  General: Patient is alert and oriented x 3, not in acute distress. Afebrile  Psych:  Mood and affect appropriate  HEENT: EOMs intact. PERRL. Oropharynx is clear. Neck: No JVD.  No pa RDW-SD 46.5 (H) 35.1 - 46.3 fL    Neutrophil Absolute Prelim 5.03 1.50 - 7.70 x10 (3) uL    Neutrophil Absolute 5.03 1.50 - 7.70 x10(3) uL    Lymphocyte Absolute 4.99 (H) 1.00 - 4.00 x10(3) uL    Monocyte Absolute 0.41 0.10 - 1.00 x10(3) uL    Eosinophi axillary adenopathy. AORTA:  Mild atherosclerosis. No aneurysm or dissection. VASCULATURE:  No visible pulmonary arterial thrombus or attenuation. ABDOMEN/PELVIS:  LIVER:  No enlargement, atrophy, abnormal density, or significant focal lesion. demonstrates moderate increase in size and the other which demonstrates similar moderate decrease in size. A 3rd retroperitoneal lymph node is stable. 2. Stable small right paratracheal lymph node.   3. Increase in diameter of the appendix with developmen could represent degenerative changes this is stable since previous study with SUV uptake of 4.5 maximally.                   =====  CONCLUSION:       New finding of right paratracheal lymph node and periaortic lymph nodes with increasing FDG uptake consist ----------------------------------------------------------------------------   Procedure information:  A transthoracic complete 2D study was performed. Additional evaluation included M-mode, complete spectral Doppler, and color   Doppler. Outpatient. atrium:  The atrium was normal in size. Mitral valve:   Structurally normal valve.   Leaflet separation was normal.   Doppler:  Transvalvular velocity was within the normal range. There was no   evidence for stenosis.  There was trivial regurgitation.      LV ejection fraction                    68    %      >=55    LV E/e', lateral                        17           ---------    LV E/e', medial                         16           ---------        Ventricular septum                      Value        Ref                       Value        Reference    RV pressure, S, DP                      29    mm Hg  ---------     Legend:   (L)  and  (H)  keron values outside specified reference range.     -----------------------------------------------------------------

## 2021-03-05 LAB — ADRENOCORTICOTROPIC HORMONE: 7.1 PG/ML

## 2021-03-11 DIAGNOSIS — Z23 NEED FOR VACCINATION: ICD-10-CM

## 2021-03-24 ENCOUNTER — OFFICE VISIT (OUTPATIENT)
Dept: HEMATOLOGY/ONCOLOGY | Facility: HOSPITAL | Age: 76
End: 2021-03-24
Attending: INTERNAL MEDICINE
Payer: MEDICARE

## 2021-03-24 VITALS
TEMPERATURE: 97 F | HEART RATE: 64 BPM | HEIGHT: 68.9 IN | WEIGHT: 199 LBS | OXYGEN SATURATION: 96 % | DIASTOLIC BLOOD PRESSURE: 66 MMHG | RESPIRATION RATE: 16 BRPM | BODY MASS INDEX: 29.47 KG/M2 | SYSTOLIC BLOOD PRESSURE: 130 MMHG

## 2021-03-24 DIAGNOSIS — R59.9 ADENOPATHY: ICD-10-CM

## 2021-03-24 DIAGNOSIS — C78.7 LIVER METASTASES (HCC): ICD-10-CM

## 2021-03-24 DIAGNOSIS — C34.81 MALIGNANT NEOPLASM OF OVERLAPPING SITES OF RIGHT LUNG (HCC): Primary | ICD-10-CM

## 2021-03-24 DIAGNOSIS — E87.1 HYPONATREMIA: ICD-10-CM

## 2021-03-24 DIAGNOSIS — R53.83 FATIGUE DUE TO TREATMENT: ICD-10-CM

## 2021-03-24 DIAGNOSIS — R59.0 HILAR ADENOPATHY: ICD-10-CM

## 2021-03-24 DIAGNOSIS — D72.820 MONOCLONAL B-CELL LYMPHOCYTOSIS: ICD-10-CM

## 2021-03-24 LAB
ALBUMIN SERPL-MCNC: 3.3 G/DL (ref 3.4–5)
ALBUMIN/GLOB SERPL: 0.9 {RATIO} (ref 1–2)
ALP LIVER SERPL-CCNC: 99 U/L
ALT SERPL-CCNC: 18 U/L
ANION GAP SERPL CALC-SCNC: 6 MMOL/L (ref 0–18)
AST SERPL-CCNC: 9 U/L (ref 15–37)
BASOPHILS # BLD AUTO: 0.03 X10(3) UL (ref 0–0.2)
BASOPHILS NFR BLD AUTO: 0.3 %
BILIRUB SERPL-MCNC: 0.3 MG/DL (ref 0.1–2)
BUN BLD-MCNC: 28 MG/DL (ref 7–18)
BUN/CREAT SERPL: 24.6 (ref 10–20)
CALCIUM BLD-MCNC: 9.1 MG/DL (ref 8.5–10.1)
CEA SERPL-MCNC: 3.8 NG/ML (ref ?–5)
CHLORIDE SERPL-SCNC: 105 MMOL/L (ref 98–112)
CO2 SERPL-SCNC: 23 MMOL/L (ref 21–32)
CREAT BLD-MCNC: 1.14 MG/DL
DEPRECATED RDW RBC AUTO: 45.8 FL (ref 35.1–46.3)
EOSINOPHIL # BLD AUTO: 0.18 X10(3) UL (ref 0–0.7)
EOSINOPHIL NFR BLD AUTO: 1.8 %
ERYTHROCYTE [DISTWIDTH] IN BLOOD BY AUTOMATED COUNT: 14.3 % (ref 11–15)
GLOBULIN PLAS-MCNC: 3.7 G/DL (ref 2.8–4.4)
GLUCOSE BLD-MCNC: 251 MG/DL (ref 70–99)
HCT VFR BLD AUTO: 45.9 %
HGB BLD-MCNC: 14.6 G/DL
IMM GRANULOCYTES # BLD AUTO: 0.02 X10(3) UL (ref 0–1)
IMM GRANULOCYTES NFR BLD: 0.2 %
LYMPHOCYTES # BLD AUTO: 4.93 X10(3) UL (ref 1–4)
LYMPHOCYTES NFR BLD AUTO: 49.1 %
M PROTEIN MFR SERPL ELPH: 7 G/DL (ref 6.4–8.2)
MCH RBC QN AUTO: 28.3 PG (ref 26–34)
MCHC RBC AUTO-ENTMCNC: 31.8 G/DL (ref 31–37)
MCV RBC AUTO: 89 FL
MONOCYTES # BLD AUTO: 0.46 X10(3) UL (ref 0.1–1)
MONOCYTES NFR BLD AUTO: 4.6 %
NEUTROPHILS # BLD AUTO: 4.43 X10 (3) UL (ref 1.5–7.7)
NEUTROPHILS # BLD AUTO: 4.43 X10(3) UL (ref 1.5–7.7)
NEUTROPHILS NFR BLD AUTO: 44 %
OSMOLALITY SERPL CALC.SUM OF ELEC: 292 MOSM/KG (ref 275–295)
PLATELET # BLD AUTO: 212 10(3)UL (ref 150–450)
POTASSIUM SERPL-SCNC: 4.5 MMOL/L (ref 3.5–5.1)
RBC # BLD AUTO: 5.16 X10(6)UL
SODIUM SERPL-SCNC: 134 MMOL/L (ref 136–145)
TSI SER-ACNC: 1.4 MIU/ML (ref 0.36–3.74)
WBC # BLD AUTO: 10.1 X10(3) UL (ref 4–11)

## 2021-03-24 PROCEDURE — 96413 CHEMO IV INFUSION 1 HR: CPT

## 2021-03-24 PROCEDURE — 99215 OFFICE O/P EST HI 40 MIN: CPT | Performed by: INTERNAL MEDICINE

## 2021-03-24 NOTE — PROGRESS NOTES
Pt here for 3 week MD f/u and due for Keytruda. Energy level has been good overall, but today it has been a little less today only. Appetite is good. Pt denies pain, no bowel issues or nausea. Pt has no further complaints.      Outpatient Oncology Care Plan

## 2021-03-24 NOTE — PROGRESS NOTES
Cancer Center Progress Note    Patient Name: Montana Fisher   YOB: 1945   Medical Record Number: [id number]   CSN: [id number]   Attending Physician: Verner Crape, M.D.    Referring Physician: Dalia BERRY      Date of Visit: 3/24/2021     Ch cell population. 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 History of Present Illness:  Energy he says has been fairly good overall. Reports mild fatigue today, CHAHAL and cough with PND. Appetite good.   Denies chest or abdominal pain, change in his urinary habi Capsule Delayed Release, Take 20 mg by mouth every morning., Disp: , Rfl:     Past Medical History:  Past Medical History:   Diagnosis Date   • Arthritis several years   • Atherosclerosis of coronary artery    • Back pain several years   • Cancer ([facility] cancer    Psychosocial History:  Social History    Socioeconomic History      Marital status:       Spouse name: Not on file      Number of children: Not on file      Years of education: Not on file      Highest education level: Not on file    Occup (5' 8.9\")   Wt 90.3 kg (199 lb)   SpO2 96%   BMI 29.47 kg/m²       Physical Examination:  PS 1  General: Patient is alert and oriented x 3, not in acute distress. Afebrile  Psych:  Mood and affect appropriate  HEENT: EOMs intact. PERRL.  Oropharynx is clement 39.0 - 53.0 %    .0 150.0 - 450.0 10(3)uL    MCV 89.0 80.0 - 100.0 fL    MCH 28.3 26.0 - 34.0 pg    MCHC 31.8 31.0 - 37.0 g/dL    RDW 14.3 11.0 - 15.0 %    RDW-SD 45.8 35.1 - 46.3 fL    Neutrophil Absolute Prelim 4.43 1.50 - 7.70 x10 (3) uL    Neutr partially calcified right hilar lymph node. CARDIAC:  Coronary artery calcium. CABG changes. PLEURA:  No mass or effusion. CHEST WALL:  No mass or axillary adenopathy. AORTA:  Mild atherosclerosis. No aneurysm or dissection.     VASCULATURE:  No v shoulders and hips. No osteolytic lesion.                        =====  CONCLUSION:    1. Mixed changes in 2 upper abdominal lymph nodes, 1 which demonstrates moderate increase in size and the other which demonstrates similar moderate decrease in size.   A increase in FDG uptake since previous study. Subtle FDG uptake is noted in the region of the left femoral neck without definitive CT correlate could represent degenerative changes this is stable since previous study with SUV uptake of 4.5 maximally. ----------------------------------------------------------------------------   History/Indications:  Lung CA.  Dyspnea.  Coronary artery disease.  Coronary   artery bypass grafting.     ------------------------------------------------------------------ dysfunction. Left atrium:  The left atrium was normal in size. The left atrial volume was   normal.   Right ventricle:  The cavity size was normal. Systolic function was normal.   Right atrium:  The atrium was normal in size.    Mitral valve:   Structural mid-wall fx shortening, PLAX         17    %      14 - 22    LV PW thickness, ED, PLAX               1.0   cm     0.6 - 1.0    IVS/LV PW ratio, ED, PLAX               0.98         ---------    LV ejection fraction                    68    %      >=55    LV       24    mm Hg  ---------        Systemic veins                          Value        Reference    Estimated CVP                           5     mm Hg  ---------        Right ventricle                         Value        Reference    RV pressure, S, DP

## 2021-03-24 NOTE — PROGRESS NOTES
Pt here for C11D1 Keytruda.   Arrives Ambulating independently, accompanied by Self           Pregnancy screening: Not applicable    Modifications in dose or schedule: no     Frequency of blood return and site check throughout administration: Prior to [facility]

## 2021-03-26 LAB — ADRENOCORTICOTROPIC HORMONE: 8.1 PG/ML

## 2021-04-14 ENCOUNTER — OFFICE VISIT (OUTPATIENT)
Dept: HEMATOLOGY/ONCOLOGY | Facility: HOSPITAL | Age: 76
End: 2021-04-14
Attending: INTERNAL MEDICINE
Payer: MEDICARE

## 2021-04-14 VITALS
HEIGHT: 68.9 IN | DIASTOLIC BLOOD PRESSURE: 72 MMHG | SYSTOLIC BLOOD PRESSURE: 152 MMHG | RESPIRATION RATE: 16 BRPM | WEIGHT: 200 LBS | TEMPERATURE: 97 F | OXYGEN SATURATION: 96 % | BODY MASS INDEX: 29.62 KG/M2 | HEART RATE: 69 BPM

## 2021-04-14 DIAGNOSIS — R59.0 HILAR ADENOPATHY: ICD-10-CM

## 2021-04-14 DIAGNOSIS — D72.820 MONOCLONAL B-CELL LYMPHOCYTOSIS: ICD-10-CM

## 2021-04-14 DIAGNOSIS — R53.83 FATIGUE DUE TO TREATMENT: ICD-10-CM

## 2021-04-14 DIAGNOSIS — C34.81 MALIGNANT NEOPLASM OF OVERLAPPING SITES OF RIGHT LUNG (HCC): Primary | ICD-10-CM

## 2021-04-14 DIAGNOSIS — E23.6: ICD-10-CM

## 2021-04-14 DIAGNOSIS — R59.9 ADENOPATHY: ICD-10-CM

## 2021-04-14 DIAGNOSIS — E87.1 HYPONATREMIA: ICD-10-CM

## 2021-04-14 DIAGNOSIS — R52 SHARP PAIN: ICD-10-CM

## 2021-04-14 DIAGNOSIS — C78.7 LIVER METASTASES (HCC): Primary | ICD-10-CM

## 2021-04-14 DIAGNOSIS — C34.81 MALIGNANT NEOPLASM OF OVERLAPPING SITES OF RIGHT LUNG (HCC): ICD-10-CM

## 2021-04-14 PROCEDURE — 99215 OFFICE O/P EST HI 40 MIN: CPT | Performed by: INTERNAL MEDICINE

## 2021-04-14 PROCEDURE — 96413 CHEMO IV INFUSION 1 HR: CPT

## 2021-04-14 NOTE — PROGRESS NOTES
[facility] Progress Note    Patient Name: Harleen Avendano   YOB: 1945   Medical Record Number: [id number]   CSN: [id number]   Attending Physician: Henri Solomon M.D.    Referring Physician: Candy BERRY      Date of Visit: 4/14/2021      Monoclonal B cell lymphocytosis     - was noted to have persistent mild lymphocytosis going back to 2018. Levels overall stable. 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        History of Present Illn by mouth daily. (Patient taking differently: Take 75 mg by mouth every evening.  ), Disp: 90 tablet, Rfl: 4  •  Metoprolol Succinate ER (TOPROL XL) 25 MG Oral Tablet 24 Hr, Take 1 tablet by mouth daily. , Disp: 90 tablet, Rfl: 4  •  omeprazole (PRILOSEC) 20 2009    both knees replaced   • OTHER SURGICAL HISTORY      RHINOPLASTY    • OTHER SURGICAL HISTORY      FEET SURGERY   • OTHER SURGICAL HISTORY      pancreatic pins from biopsies   • TONSILLECTOMY         Family Medical History:   Mother with h/o cancer Systems:  A 14-point ROS was done with pertinent positives and negative per the HPI    Vital Signs:  /72 (BP Location: Left arm, Patient Position: Sitting, Cuff Size: adult)   Pulse 69   Temp 97.1 °F (36.2 °C) (Temporal)   Resp 16   Ht 1.75 m (5' 8.9 26.6 (H) 04/14/2021    CREATSERUM 1.09 04/14/2021    ANIONGAP 5 04/14/2021    GFR 77 01/03/2018    GFRNAA 65 04/14/2021    GFRAA 75 04/14/2021    CA 9.3 04/14/2021    OSMOCALC 293 04/14/2021    ALKPHO 90 04/14/2021    AST 12 (L) 04/14/2021    ALT 20 04/14/ mass or effusion. CHEST WALL:  No mass or axillary adenopathy. AORTA:  Mild atherosclerosis. No aneurysm or dissection. VASCULATURE:  No visible pulmonary arterial thrombus or attenuation.        ABDOMEN/PELVIS:  LIVER:  No enlargement, atrophy, a upper abdominal lymph nodes, 1 which demonstrates moderate increase in size and the other which demonstrates similar moderate decrease in size. A 3rd retroperitoneal lymph node is stable. 2. Stable small right paratracheal lymph node.   3. Increase in saundra without definitive CT correlate could represent degenerative changes this is stable since previous study with SUV uptake of 4.5 maximally.                   =====  CONCLUSION:       New finding of right paratracheal lymph node and periaortic lymph nodes wi grafting.     ----------------------------------------------------------------------------   Procedure information:  A transthoracic complete 2D study was performed. Additional evaluation included M-mode, complete spectral Doppler, and color   Doppler.  O Right atrium:  The atrium was normal in size. Mitral valve:   Structurally normal valve.   Leaflet separation was normal.   Doppler:  Transvalvular velocity was within the normal range. There was no   evidence for stenosis.  There was trivial regurgitat ---------    LV ejection fraction                    68    %      >=55    LV E/e', lateral                        17           ---------    LV E/e', medial                         16           ---------        Ventricular septum                      Value ventricle                         Value        Reference    RV pressure, S, DP                      29    mm Hg  ---------     Legend:   (L)  and  (H)  keron values outside specified reference range.     -----------------------------------------------------

## 2021-04-14 NOTE — PROGRESS NOTES
Pt here for C12D1 of  Keytruda.   Arrives Ambulating independently, accompanied by Self           Pregnancy screening: Not applicable    Modifications in dose or schedule: No     Frequency of blood return and site check throughout administration: Prior to a 74

## 2021-04-22 ENCOUNTER — HOSPITAL ENCOUNTER (OUTPATIENT)
Dept: CT IMAGING | Facility: HOSPITAL | Age: 76
Discharge: HOME | End: 2021-04-22
Attending: INTERNAL MEDICINE
Payer: MEDICARE

## 2021-04-22 DIAGNOSIS — C34.81 MALIGNANT NEOPLASM OF OVERLAPPING SITES OF RIGHT LUNG (HCC): ICD-10-CM

## 2021-04-22 PROCEDURE — 74177 CT ABD & PELVIS W/CONTRAST: CPT | Performed by: INTERNAL MEDICINE

## 2021-04-22 PROCEDURE — 71260 CT THORAX DX C+: CPT | Performed by: INTERNAL MEDICINE

## 2021-05-05 ENCOUNTER — OFFICE VISIT (OUTPATIENT)
Dept: HEMATOLOGY/ONCOLOGY | Facility: HOSPITAL | Age: 76
End: 2021-05-05
Attending: INTERNAL MEDICINE
Payer: MEDICARE

## 2021-05-05 VITALS
DIASTOLIC BLOOD PRESSURE: 75 MMHG | BODY MASS INDEX: 29 KG/M2 | HEART RATE: 87 BPM | RESPIRATION RATE: 16 BRPM | SYSTOLIC BLOOD PRESSURE: 155 MMHG | WEIGHT: 197.5 LBS | OXYGEN SATURATION: 96 %

## 2021-05-05 DIAGNOSIS — C34.81 MALIGNANT NEOPLASM OF OVERLAPPING SITES OF RIGHT LUNG (HCC): Primary | ICD-10-CM

## 2021-05-05 DIAGNOSIS — R19.8 GI SYMPTOMS: ICD-10-CM

## 2021-05-05 DIAGNOSIS — E87.1 HYPONATREMIA: ICD-10-CM

## 2021-05-05 DIAGNOSIS — R59.9 ADENOPATHY: ICD-10-CM

## 2021-05-05 DIAGNOSIS — R53.0 NEOPLASTIC MALIGNANT RELATED FATIGUE: ICD-10-CM

## 2021-05-05 DIAGNOSIS — T88.7XXA MEDICATION SIDE EFFECTS: ICD-10-CM

## 2021-05-05 PROCEDURE — 99215 OFFICE O/P EST HI 40 MIN: CPT | Performed by: INTERNAL MEDICINE

## 2021-05-05 PROCEDURE — 96413 CHEMO IV INFUSION 1 HR: CPT

## 2021-05-05 NOTE — PROGRESS NOTES
Pt here for C13D1.   Arrives Ambulating independently, accompanied by Self           Pregnancy screening: Not applicable    Modifications in dose or schedule: No     Frequency of blood return and site check throughout administration: Prior to administration

## 2021-05-11 ENCOUNTER — HOSPITAL ENCOUNTER (OUTPATIENT)
Age: 76
End: 2021-05-11
Attending: OPHTHALMOLOGY | Admitting: OPHTHALMOLOGY

## 2021-05-11 NOTE — PROGRESS NOTES
Cancer Center Progress Note    Patient Name: Bhavesh Corcoran   YOB: 1945   Medical Record Number: [id number]   CSN: [id number]   Attending Physician: Sharlee Lesches, M.D.    Referring Physician: DR Katherine BERRY      Date of Visit: 5/5/2021 mild lymphocytosis going back to 2018. Levels overall stable. 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        History of Present Illness:  Abdominal discomfort with occasional cramping happening again Misc, 1 each by Other route 2 (two) times daily. , Disp: 60 each, Rfl: 1  •  Clopidogrel Bisulfate (PLAVIX) 75 MG Oral Tab, Take 1 tablet by mouth daily.  (Patient taking differently: Take 75 mg by mouth every evening.  ), Disp: 90 tablet, Rfl: 4  •  Metopro LeftTKR   • OTHER SURGICAL HISTORY  2009    both knees replaced   • OTHER SURGICAL HISTORY      RHINOPLASTY    • OTHER SURGICAL HISTORY      FEET SURGERY   • OTHER SURGICAL HISTORY      pancreatic pins from biopsies   • [facility] Known Allergies     Review of Systems:  A 14-point ROS was done with pertinent positives and negative per the HPI    Vital Signs:  /75 (BP Location: Left arm, Patient Position: Sitting, Cuff Size: adult)   Pulse 87   Resp 16   Wt 89.6 kg (197 lb 8 oz CO2 21.0 05/05/2021     Lab Component   Component Value Date/Time    CEA 4.7 05/05/2021 0848     Lab Component   Component Value Date/Time     08/04/2020 1138         Radiology:  PROCEDURE:  CT CHEST+ABDOMEN+PELVIS(ALL CNTRST ONLY)(CPT=71260/58829)  Simple 1 cm cyst in the left upper pole of the kidney.  There are parapelvic cysts noted on the left measuring up to 2 x 9 mm in size and parapelvic cyst in the right measuring 22 x 20 mm in size.    ADRENALS:  No mass or enlargement.     AORTA:  No aneury 4/22/2021 at 10:07 AM       Finalized by (CST): Catherine Rollins MD on 4/22/2021 at 10:43 AM            PROCEDURE:  CT CHEST+ABDOMEN+PELVIS(ALL CNTRST ONLY)(CPT=71260/31523)     COMPARISON:  [facility] , CT, CT CHEST+ABDOMEN+PELVIS(ALL CNTRST ONLY)(CPT=71260/7417 enlargement or focal lesion. Multiple small calcified granulomas. KIDNEYS:  No mass, obstruction, or calcification. Stable small left renal cyst.  ADRENALS:  No mass or enlargement. AORTA/VASCULAR:  Atherosclerosis. No aneurysm.   Circumaortic left r calculus.         Dictated by (CST): Simone Fitzpatrick MD on 2021 at 9:59 AM       Finalized by (CST): Simone Fitzpatrick MD on 2021 at 10:33 AM         Cardiology:  Transthoracic Echocardiogram     Name:Cricket Luke     Date: 2021 :  19 *     ----------------------------------------------------------------------------   *   Left ventricle:  The cavity size was normal. Wall thickness was normal.   Systolic function was normal. The estimated ejection fraction was 60-65%.    There was no di vessel was normal in size.  The respirophasic   diameter changes were in the normal range (greater than or equal to 50%).     ----------------------------------------------------------------------------   Measurements      Left ventricle                     peak velocity             0.9   m/sec  ---------    Mitral peak gradient, D                 4     mm Hg  ---------    Mitral E/A ratio, peak                  1.1          ---------        Pulmonary arteries                      Value        Reference    PA contributing. ECHO just done showed no change from 2018    5. MBL- Lymphocytosis overall stable and no intervention required. We will continue to monitor this closely. Hb and platelets continue to be normal    6. 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

## 2021-05-17 ENCOUNTER — TELEPHONE (OUTPATIENT)
Dept: CARDIOLOGY | Age: 76
End: 2021-05-17

## 2021-05-24 ENCOUNTER — OFFICE VISIT (OUTPATIENT)
Dept: HEMATOLOGY/ONCOLOGY | Facility: HOSPITAL | Age: 76
End: 2021-05-24
Attending: INTERNAL MEDICINE
Payer: MEDICARE

## 2021-05-24 VITALS
BODY MASS INDEX: 28.76 KG/M2 | DIASTOLIC BLOOD PRESSURE: 76 MMHG | OXYGEN SATURATION: 97 % | HEIGHT: 68.9 IN | SYSTOLIC BLOOD PRESSURE: 128 MMHG | WEIGHT: 194.19 LBS | TEMPERATURE: 97 F | HEART RATE: 75 BPM | RESPIRATION RATE: 16 BRPM

## 2021-05-24 DIAGNOSIS — R59.0 HILAR ADENOPATHY: ICD-10-CM

## 2021-05-24 DIAGNOSIS — E87.1 HYPONATREMIA: ICD-10-CM

## 2021-05-24 DIAGNOSIS — C34.81 MALIGNANT NEOPLASM OF OVERLAPPING SITES OF RIGHT LUNG (HCC): Primary | ICD-10-CM

## 2021-05-24 DIAGNOSIS — R53.83 FATIGUE DUE TO TREATMENT: ICD-10-CM

## 2021-05-24 DIAGNOSIS — C78.7 LIVER METASTASES (HCC): ICD-10-CM

## 2021-05-24 DIAGNOSIS — C34.81 MALIGNANT NEOPLASM OF OVERLAPPING SITES OF RIGHT LUNG (HCC): ICD-10-CM

## 2021-05-24 DIAGNOSIS — R19.8 GI SYMPTOMS: ICD-10-CM

## 2021-05-24 DIAGNOSIS — R53.0 NEOPLASTIC MALIGNANT RELATED FATIGUE: ICD-10-CM

## 2021-05-24 DIAGNOSIS — R59.9 ADENOPATHY: Primary | ICD-10-CM

## 2021-05-24 PROCEDURE — 99215 OFFICE O/P EST HI 40 MIN: CPT | Performed by: INTERNAL MEDICINE

## 2021-05-24 PROCEDURE — 96413 CHEMO IV INFUSION 1 HR: CPT

## 2021-05-24 NOTE — PROGRESS NOTES
Cancer Center Progress Note    Patient Name: Bolivar Barry   YOB: 1945   Medical Record Number: [id number]   CSN: [id number]   Attending Physician: Margie Martines M.D.    Referring Physician: DR Carisa BERRY      Date of Visit: 5/24/2021 back to 2018. Levels overall stable. 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 History of Present Illness:  Abdominal discomfort with occasional cramping persistent.  Not painful but aches and \" Tablet 24 Hr, Take 1 tablet by mouth daily. , Disp: 90 tablet, Rfl: 4  •  omeprazole (PRILOSEC) 20 MG Oral Capsule Delayed Release, Take 20 mg by mouth every morning., Disp: , Rfl:     Past Medical History:  Past Medical History:   Diagnosis Date   • Arthri cancer    Psychosocial History:  Social History    Socioeconomic History      Marital status:       Spouse name: Not on file      Number of children: Not on file      Years of education: Not on file      Highest education level: Not on file    Occup (5' 8.9\")   Wt 88.1 kg (194 lb 3.2 oz)   SpO2 97%   BMI 28.76 kg/m²       Physical Examination:  PS 1  General: Patient is alert and oriented x 3, not in acute distress. Afebrile  Psych:  Mood and affect appropriate  HEENT: EOMs intact. PERRL.  Oropharynx CHEST+ABDOMEN+PELVIS(ALL CNTRST ONLY)(CPT=71260/47474), 1/18/2021, 9:20 AM.       INDICATIONS:  C34.81 Malignant neoplasm of overlapping sites of right lung ([address]       TECHNIQUE:  IV contrast-enhanced scanning through the chest, abdomen, and pelvis was pe atherosclerotic plaque of the aorta iliac vessels. RETROPERITONEUM:  A portal caval lymph node is increased in size measuring 26 x 20 mm were previously measured (22 x 17 mm) on image number 183.  A lymph node just anterior to the bifurcation of the nay PET/CT STANDARD BODY SCAN (ONCOLOGY) (CPT=78815), 10/23/2020, 9:49 AM.     INDICATIONS:  C34.81 Malignant neoplasm of overlapping sites of right lung ([address]     TECHNIQUE:  IV contrast-enhanced scanning through the chest, abdomen, and pelvis was performed. between the inferior vena cava and proper hepatic artery measures 1.9 x 1.8 cm on image 28 of series 2, previously 2.7 x 2.5 cm.   Lymph node at the branch point of the celiac artery between the proper hepatic and splenic   arteries measures 2.1 x 2.1 cm on  Age:  76years    Wt:  (200lb) HR: 55bpm   Loc:  [facility]        Gndr: M          BSA: 4.12m^2     Sonographer: Svetlana SHIPLEY   Ordering:   Kaci Salcedo       Referring: Nic Cassidy I     -------------------------------------------------------------- abnormalities. Features are consistent with a pseudonormal left ventricular filling   pattern, with concomitant abnormal relaxation and increased filling pressure   - grade 2 diastolic dysfunction. Left atrium:  The left atrium was normal in size.  The PLAX                         4.7   cm     4.2 - 5.9    LV ID, ES, PLAX                         2.9   cm     ---------    LV fx shortening, PLAX                  38    %      25 - 43    LV mid-wall fx shortening, PLAX         17    %      14 - 22    LV PW t Hg  ---------        Tricuspid valve                         Value        Reference    Tricuspid regurg peak velocity          2.47  m/sec  ---------    Tricuspid peak RV-RA gradient           24    mm Hg  ---------        Systemic veins                   Resolved    8. Abdominal discomfort, cramping- also felt to be from IO which comes and goes. OTC meds for comfort for now. Adenopathy on imaging seem to correlate when he has GI symptoms. No significant diarrhea      Proceed with Pembro today.  Labs reviewe

## 2021-05-25 VITALS
HEIGHT: 70 IN | BODY MASS INDEX: 30.78 KG/M2 | HEART RATE: 62 BPM | WEIGHT: 215 LBS | DIASTOLIC BLOOD PRESSURE: 72 MMHG | SYSTOLIC BLOOD PRESSURE: 116 MMHG

## 2021-05-26 ENCOUNTER — APPOINTMENT (OUTPATIENT)
Dept: HEMATOLOGY/ONCOLOGY | Facility: HOSPITAL | Age: 76
End: 2021-05-26
Attending: INTERNAL MEDICINE
Payer: MEDICARE

## 2021-06-14 ENCOUNTER — OFFICE VISIT (OUTPATIENT)
Dept: HEMATOLOGY/ONCOLOGY | Facility: HOSPITAL | Age: 76
End: 2021-06-14
Attending: INTERNAL MEDICINE
Payer: MEDICARE

## 2021-06-14 VITALS
HEART RATE: 71 BPM | OXYGEN SATURATION: 98 % | HEIGHT: 68.9 IN | SYSTOLIC BLOOD PRESSURE: 126 MMHG | TEMPERATURE: 98 F | BODY MASS INDEX: 29.03 KG/M2 | WEIGHT: 196 LBS | DIASTOLIC BLOOD PRESSURE: 72 MMHG | RESPIRATION RATE: 16 BRPM

## 2021-06-14 DIAGNOSIS — G47.09 OTHER INSOMNIA: ICD-10-CM

## 2021-06-14 DIAGNOSIS — C78.7 LIVER METASTASES (HCC): ICD-10-CM

## 2021-06-14 DIAGNOSIS — R53.0 NEOPLASTIC MALIGNANT RELATED FATIGUE: ICD-10-CM

## 2021-06-14 DIAGNOSIS — C34.81 MALIGNANT NEOPLASM OF OVERLAPPING SITES OF RIGHT LUNG (HCC): Primary | ICD-10-CM

## 2021-06-14 DIAGNOSIS — E87.1 HYPONATREMIA: ICD-10-CM

## 2021-06-14 DIAGNOSIS — D72.820 MONOCLONAL B-CELL LYMPHOCYTOSIS: ICD-10-CM

## 2021-06-14 DIAGNOSIS — R59.9 ADENOPATHY: ICD-10-CM

## 2021-06-14 DIAGNOSIS — R19.8 GI SYMPTOMS: ICD-10-CM

## 2021-06-14 DIAGNOSIS — R59.0 HILAR ADENOPATHY: ICD-10-CM

## 2021-06-14 DIAGNOSIS — T88.7XXA MEDICATION SIDE EFFECTS: ICD-10-CM

## 2021-06-14 PROCEDURE — 96372 THER/PROPH/DIAG INJ SC/IM: CPT

## 2021-06-14 PROCEDURE — 96413 CHEMO IV INFUSION 1 HR: CPT

## 2021-06-14 PROCEDURE — 99215 OFFICE O/P EST HI 40 MIN: CPT | Performed by: INTERNAL MEDICINE

## 2021-06-14 RX ORDER — CYANOCOBALAMIN 1000 UG/ML
1000 INJECTION INTRAMUSCULAR; SUBCUTANEOUS ONCE
Status: CANCELLED | OUTPATIENT
Start: 2021-07-12

## 2021-06-14 RX ORDER — CYANOCOBALAMIN 1000 UG/ML
1000 INJECTION INTRAMUSCULAR; SUBCUTANEOUS ONCE
Status: CANCELLED | OUTPATIENT
Start: 2021-06-14

## 2021-06-14 RX ORDER — CYANOCOBALAMIN 1000 UG/ML
1000 INJECTION INTRAMUSCULAR; SUBCUTANEOUS ONCE
Status: COMPLETED | OUTPATIENT
Start: 2021-06-14 | End: 2021-06-14

## 2021-06-14 RX ADMIN — CYANOCOBALAMIN 1000 MCG: 1000 INJECTION INTRAMUSCULAR; SUBCUTANEOUS at 14:01:00

## 2021-06-14 NOTE — PROGRESS NOTES
Cancer Center Progress Note    Patient Name: Gabe Litten   YOB: 1945   Medical Record Number: [id number]   CSN: [id number]   Attending Physician: Yolanda Mullins M.D.    Referring Physician: DR Duc BERRY      Date of Visit: 6/14/2021 to 2018. Levels overall stable. 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 History of Present Illness:  Continued fatigue but still active and working this summer.  Abdominal discomfort with occas (TOPROL XL) 25 MG Oral Tablet 24 Hr, Take 1 tablet by mouth daily. , Disp: 90 tablet, Rfl: 4  •  omeprazole (PRILOSEC) 20 MG Oral Capsule Delayed Release, Take 20 mg by mouth every morning., Disp: , Rfl:     Past Medical History:  Past Medical History:   Rj Parrish History:   Mother with h/o cancer    Psychosocial History:  Social History    Socioeconomic History      Marital status:       Spouse name: Not on file      Number of children: Not on file      Years of education: Not on file      Highest education l Resp 16   Ht 1.75 m (5' 8.9\")   Wt 88.9 kg (196 lb)   SpO2 98%   BMI 29.03 kg/m²       Physical Examination:  PS 1  General: Patient is alert and oriented x 3, not in acute distress. Afebrile  Psych:  Mood and affect appropriate  HEENT: EOMs intact.  PER CHEST+ABDOMEN+PELVIS(ALL CNTRST ONLY)(CZP=59182/24734)       COMPARISON:  [facility] , CT, CT CHEST+ABDOMEN+PELVIS(ALL CNTRST ONLY)(CPT=71260/22564), 1/18/2021, 9:20 AM.       INDICATIONS:  C34.81 Malignant neoplasm of overlapping sites of right lung ([address]     ADRENALS:  No mass or enlargement.     AORTA:  No aneurysm or dissection.  Moderate diffuse atherosclerotic plaque of the aorta iliac vessels.    RETROPERITONEUM:  A portal caval lymph node is increased in size measuring 26 x 20 mm were previously measure CHEST+ABDOMEN+PELVIS(ALL CNTRST ONLY)(CPT=71260/40657), 7/29/2020, 10:37 AM.  [facility] , [place], PET/CT STANDARD BODY SCAN (ONCOLOGY) (CPT=78815), 10/23/2020, 9:49 AM.     INDICATIONS:  C34.81 Malignant neoplasm of overlapping sites of right lung [facility])     TECHN Atherosclerosis. No aneurysm. Circumaortic left renal vein. RETROPERITONEUM:  Lymph node between the inferior vena cava and proper hepatic artery measures 1.9 x 1.8 cm on image 28 of series 2, previously 2.7 x 2.5 cm.   Lymph node at the branch point of Irineo Gonzalez     Date: 2021 :  1945 Ht:  (70in)  BP: 110 / 60   MRN:  [id number]    Age:  76years    Wt:  (200lb) HR: 55bpm   Loc:  [facility]        Gndr: M          BSA: 2.09m^2     Sonographer: Svetlana SHIPLEY   Ordering:   Chan Khan The estimated ejection fraction was 60-65%. There was no diagnostic evidence for regional wall motion abnormalities.    Features are consistent with a pseudonormal left ventricular filling   pattern, with concomitant abnormal relaxation and increased fill ----------------------------------------------------------------------------   Measurements      Left ventricle                          Value        Reference    LV ID, ED, PLAX                         4.7   cm     4.2 - 5.9    LV ID, ES, PLAX         E/A ratio, peak                  1.1          ---------        Pulmonary arteries                      Value        Reference    PA pressure, S, DP                      29    mm Hg  ---------        Tricuspid valve                         Value        Refe Abdominal discomfort, cramping-  felt to be from IO which comes and goes. OTC meds for comfort for now. Adenopathy on imaging seem to correlate when he has GI symptoms. No significant diarrhea    8.  Insomnia- OTC sleep aids to start    Proceed with [facility]

## 2021-06-14 NOTE — PROGRESS NOTES
Pt here for C15D1 of Keytruda  Arrives Ambulating independently, accompanied by Self           Pregnancy screening: Not applicable    Modifications in dose or schedule: No     Frequency of blood return and site check throughout administration: Prior to adm

## 2021-07-06 ENCOUNTER — OFFICE VISIT (OUTPATIENT)
Dept: HEMATOLOGY/ONCOLOGY | Facility: HOSPITAL | Age: 76
End: 2021-07-06
Attending: INTERNAL MEDICINE
Payer: MEDICARE

## 2021-07-06 ENCOUNTER — DOCUMENTATION ONLY (OUTPATIENT)
Dept: HEMATOLOGY/ONCOLOGY | Facility: HOSPITAL | Age: 76
End: 2021-07-06

## 2021-07-06 VITALS
HEART RATE: 68 BPM | OXYGEN SATURATION: 98 % | WEIGHT: 195.19 LBS | TEMPERATURE: 97 F | DIASTOLIC BLOOD PRESSURE: 76 MMHG | RESPIRATION RATE: 18 BRPM | SYSTOLIC BLOOD PRESSURE: 155 MMHG | BODY MASS INDEX: 29 KG/M2

## 2021-07-06 DIAGNOSIS — R53.0 NEOPLASTIC MALIGNANT RELATED FATIGUE: ICD-10-CM

## 2021-07-06 DIAGNOSIS — C34.81 MALIGNANT NEOPLASM OF OVERLAPPING SITES OF RIGHT LUNG (HCC): Primary | ICD-10-CM

## 2021-07-06 DIAGNOSIS — R59.9 ADENOPATHY: ICD-10-CM

## 2021-07-06 DIAGNOSIS — Z51.12 ENCOUNTER FOR ANTINEOPLASTIC IMMUNOTHERAPY: ICD-10-CM

## 2021-07-06 PROBLEM — Z95.5 PRESENCE OF DRUG COATED STENT IN LAD CORONARY ARTERY: Status: ACTIVE | Noted: 2019-11-11

## 2021-07-06 LAB
ALBUMIN SERPL-MCNC: 3.4 G/DL (ref 3.4–5)
ALBUMIN/GLOB SERPL: 1 {RATIO} (ref 1–2)
ALP LIVER SERPL-CCNC: 96 U/L
ALT SERPL-CCNC: 19 U/L
ANION GAP SERPL CALC-SCNC: 6 MMOL/L (ref 0–18)
AST SERPL-CCNC: 11 U/L (ref 15–37)
BASOPHILS # BLD AUTO: 0.04 X10(3) UL (ref 0–0.2)
BASOPHILS NFR BLD AUTO: 0.4 %
BILIRUB SERPL-MCNC: 0.4 MG/DL (ref 0.1–2)
BUN BLD-MCNC: 22 MG/DL (ref 7–18)
BUN/CREAT SERPL: 24.2 (ref 10–20)
CALCIUM BLD-MCNC: 9.1 MG/DL (ref 8.5–10.1)
CEA SERPL-MCNC: 4.4 NG/ML (ref ?–5)
CHLORIDE SERPL-SCNC: 105 MMOL/L (ref 98–112)
CO2 SERPL-SCNC: 26 MMOL/L (ref 21–32)
CREAT BLD-MCNC: 0.91 MG/DL
DEPRECATED RDW RBC AUTO: 45.2 FL (ref 35.1–46.3)
EOSINOPHIL # BLD AUTO: 0.25 X10(3) UL (ref 0–0.7)
EOSINOPHIL NFR BLD AUTO: 2.6 %
ERYTHROCYTE [DISTWIDTH] IN BLOOD BY AUTOMATED COUNT: 14.2 % (ref 11–15)
GLOBULIN PLAS-MCNC: 3.5 G/DL (ref 2.8–4.4)
GLUCOSE BLD-MCNC: 133 MG/DL (ref 70–99)
HCT VFR BLD AUTO: 42.8 %
HGB BLD-MCNC: 13.7 G/DL
IMM GRANULOCYTES # BLD AUTO: 0.02 X10(3) UL (ref 0–1)
IMM GRANULOCYTES NFR BLD: 0.2 %
LYMPHOCYTES # BLD AUTO: 4.66 X10(3) UL (ref 1–4)
LYMPHOCYTES NFR BLD AUTO: 49.2 %
M PROTEIN MFR SERPL ELPH: 6.9 G/DL (ref 6.4–8.2)
MCH RBC QN AUTO: 28 PG (ref 26–34)
MCHC RBC AUTO-ENTMCNC: 32 G/DL (ref 31–37)
MCV RBC AUTO: 87.3 FL
MONOCYTES # BLD AUTO: 0.41 X10(3) UL (ref 0.1–1)
MONOCYTES NFR BLD AUTO: 4.3 %
NEUTROPHILS # BLD AUTO: 4.09 X10 (3) UL (ref 1.5–7.7)
NEUTROPHILS # BLD AUTO: 4.09 X10(3) UL (ref 1.5–7.7)
NEUTROPHILS NFR BLD AUTO: 43.3 %
OSMOLALITY SERPL CALC.SUM OF ELEC: 289 MOSM/KG (ref 275–295)
PLATELET # BLD AUTO: 283 10(3)UL (ref 150–450)
POTASSIUM SERPL-SCNC: 4.2 MMOL/L (ref 3.5–5.1)
RBC # BLD AUTO: 4.9 X10(6)UL
SODIUM SERPL-SCNC: 137 MMOL/L (ref 136–145)
WBC # BLD AUTO: 9.5 X10(3) UL (ref 4–11)

## 2021-07-06 PROCEDURE — 96413 CHEMO IV INFUSION 1 HR: CPT

## 2021-07-06 PROCEDURE — 99215 OFFICE O/P EST HI 40 MIN: CPT | Performed by: NURSE PRACTITIONER

## 2021-07-06 PROCEDURE — 96372 THER/PROPH/DIAG INJ SC/IM: CPT

## 2021-07-06 RX ORDER — CYANOCOBALAMIN 1000 UG/ML
1000 INJECTION INTRAMUSCULAR; SUBCUTANEOUS ONCE
Status: COMPLETED | OUTPATIENT
Start: 2021-07-06 | End: 2021-07-06

## 2021-07-06 RX ORDER — CYANOCOBALAMIN 1000 UG/ML
1000 INJECTION INTRAMUSCULAR; SUBCUTANEOUS ONCE
Status: CANCELLED | OUTPATIENT
Start: 2021-07-12

## 2021-07-06 RX ADMIN — CYANOCOBALAMIN 1000 MCG: 1000 INJECTION INTRAMUSCULAR; SUBCUTANEOUS at 10:07:00

## 2021-07-06 NOTE — PROGRESS NOTES
Patient presents with: Follow - Up: APN assessment prior to treatment    Pt is here for treatment; C16 D1 Lazarus Ixonia is expected. Pt states that overall he is doing well. Eating and drinking without issue; denies N,V,D,C or skin rashes.  Fatigue has improved

## 2021-07-06 NOTE — PROGRESS NOTES
ANP Visit Note    Patient Name: Bhavesh Corcoran   YOB: 1945   Medical Record Number: [id number]   CSN: [id number]   Date of visit: 7/6/2021       Chief Complaint/Reason for Visit:  Patient presents with:   Follow - Up: APN assessment prior to tr mg by mouth daily. , Disp: , Rfl:   •  Insulin Pen Needle (NOVOFINE) 32G X 6 MM Does not apply Misc, 1 each by Other route 2 (two) times daily. , Disp: 60 each, Rfl: 1  •  Clopidogrel Bisulfate (PLAVIX) 75 MG Oral Tab, Take 1 tablet by mouth daily.  (Patient Creatinine 0.91 0.70 - 1.30 mg/dL    BUN/CREA Ratio 24.2 (H) 10.0 - 20.0    Calcium, Total 9.1 8.5 - 10.1 mg/dL    Calculated Osmolality 289 275 - 295 mOsm/kg    GFR, Non- 81 >=60    GFR, -American 94 >=60    AST 11 (L) 15 - 37 U/L for MD, labs, and Eran    Risk Level: HIGH - lung cancer receiving immunotherapy requiring close monitoring       Electronically Signed by:    Joanie Drew DNP, NP-C  Nurse Practitioner  [facility]

## 2021-07-06 NOTE — PROGRESS NOTES
Pt here for C16D1.   Arrives Ambulating independently, accompanied by Self           Pregnancy screening: Not applicable    Modifications in dose or schedule: No     Frequency of blood return and site check throughout administration: Prior to administration

## 2021-07-09 ENCOUNTER — TELEPHONE (OUTPATIENT)
Dept: CARDIOLOGY | Age: 76
End: 2021-07-09

## 2021-07-26 ENCOUNTER — OFFICE VISIT (OUTPATIENT)
Dept: HEMATOLOGY/ONCOLOGY | Facility: HOSPITAL | Age: 76
End: 2021-07-26
Attending: INTERNAL MEDICINE
Payer: MEDICARE

## 2021-07-26 VITALS
SYSTOLIC BLOOD PRESSURE: 123 MMHG | WEIGHT: 195 LBS | HEART RATE: 71 BPM | BODY MASS INDEX: 28.88 KG/M2 | TEMPERATURE: 98 F | RESPIRATION RATE: 18 BRPM | OXYGEN SATURATION: 97 % | DIASTOLIC BLOOD PRESSURE: 71 MMHG | HEIGHT: 68.9 IN

## 2021-07-26 DIAGNOSIS — Z51.12 ENCOUNTER FOR ANTINEOPLASTIC IMMUNOTHERAPY: ICD-10-CM

## 2021-07-26 DIAGNOSIS — T88.7XXA MEDICATION SIDE EFFECTS: ICD-10-CM

## 2021-07-26 DIAGNOSIS — R59.0 HILAR ADENOPATHY: ICD-10-CM

## 2021-07-26 DIAGNOSIS — R59.9 ADENOPATHY: ICD-10-CM

## 2021-07-26 DIAGNOSIS — R53.0 NEOPLASTIC MALIGNANT RELATED FATIGUE: ICD-10-CM

## 2021-07-26 DIAGNOSIS — C34.81 MALIGNANT NEOPLASM OF OVERLAPPING SITES OF RIGHT LUNG (HCC): Primary | ICD-10-CM

## 2021-07-26 DIAGNOSIS — E87.1 HYPONATREMIA: ICD-10-CM

## 2021-07-26 DIAGNOSIS — C78.7 LIVER METASTASES (HCC): ICD-10-CM

## 2021-07-26 DIAGNOSIS — R19.8 GI SYMPTOMS: ICD-10-CM

## 2021-07-26 DIAGNOSIS — R53.83 FATIGUE DUE TO TREATMENT: ICD-10-CM

## 2021-07-26 DIAGNOSIS — D72.820 MONOCLONAL B-CELL LYMPHOCYTOSIS: ICD-10-CM

## 2021-07-26 LAB
ALBUMIN SERPL-MCNC: 3.4 G/DL (ref 3.4–5)
ALBUMIN/GLOB SERPL: 0.9 {RATIO} (ref 1–2)
ALP LIVER SERPL-CCNC: 95 U/L
ALT SERPL-CCNC: 17 U/L
ANION GAP SERPL CALC-SCNC: 6 MMOL/L (ref 0–18)
AST SERPL-CCNC: 16 U/L (ref 15–37)
BASOPHILS # BLD AUTO: 0.04 X10(3) UL (ref 0–0.2)
BASOPHILS NFR BLD AUTO: 0.4 %
BILIRUB SERPL-MCNC: 0.4 MG/DL (ref 0.1–2)
BUN BLD-MCNC: 26 MG/DL (ref 7–18)
BUN/CREAT SERPL: 22.2 (ref 10–20)
CALCIUM BLD-MCNC: 9.1 MG/DL (ref 8.5–10.1)
CEA SERPL-MCNC: 4.3 NG/ML (ref ?–5)
CHLORIDE SERPL-SCNC: 104 MMOL/L (ref 98–112)
CO2 SERPL-SCNC: 25 MMOL/L (ref 21–32)
CREAT BLD-MCNC: 1.17 MG/DL
DEPRECATED RDW RBC AUTO: 45.1 FL (ref 35.1–46.3)
EOSINOPHIL # BLD AUTO: 0.21 X10(3) UL (ref 0–0.7)
EOSINOPHIL NFR BLD AUTO: 2.2 %
ERYTHROCYTE [DISTWIDTH] IN BLOOD BY AUTOMATED COUNT: 14.3 % (ref 11–15)
GLOBULIN PLAS-MCNC: 3.7 G/DL (ref 2.8–4.4)
GLUCOSE BLD-MCNC: 223 MG/DL (ref 70–99)
HCT VFR BLD AUTO: 41.5 %
HGB BLD-MCNC: 13.5 G/DL
IMM GRANULOCYTES # BLD AUTO: 0.02 X10(3) UL (ref 0–1)
IMM GRANULOCYTES NFR BLD: 0.2 %
LYMPHOCYTES # BLD AUTO: 4.36 X10(3) UL (ref 1–4)
LYMPHOCYTES NFR BLD AUTO: 45.9 %
M PROTEIN MFR SERPL ELPH: 7.1 G/DL (ref 6.4–8.2)
MCH RBC QN AUTO: 28.1 PG (ref 26–34)
MCHC RBC AUTO-ENTMCNC: 32.5 G/DL (ref 31–37)
MCV RBC AUTO: 86.3 FL
MONOCYTES # BLD AUTO: 0.39 X10(3) UL (ref 0.1–1)
MONOCYTES NFR BLD AUTO: 4.1 %
NEUTROPHILS # BLD AUTO: 4.47 X10 (3) UL (ref 1.5–7.7)
NEUTROPHILS # BLD AUTO: 4.47 X10(3) UL (ref 1.5–7.7)
NEUTROPHILS NFR BLD AUTO: 47.2 %
OSMOLALITY SERPL CALC.SUM OF ELEC: 292 MOSM/KG (ref 275–295)
PATIENT FASTING Y/N/NP: NO
PLATELET # BLD AUTO: 270 10(3)UL (ref 150–450)
POTASSIUM SERPL-SCNC: 4.6 MMOL/L (ref 3.5–5.1)
RBC # BLD AUTO: 4.81 X10(6)UL
SODIUM SERPL-SCNC: 135 MMOL/L (ref 136–145)
TSI SER-ACNC: 1.33 MIU/ML (ref 0.36–3.74)
WBC # BLD AUTO: 9.5 X10(3) UL (ref 4–11)

## 2021-07-26 PROCEDURE — 96372 THER/PROPH/DIAG INJ SC/IM: CPT

## 2021-07-26 PROCEDURE — 96413 CHEMO IV INFUSION 1 HR: CPT

## 2021-07-26 PROCEDURE — 99215 OFFICE O/P EST HI 40 MIN: CPT | Performed by: INTERNAL MEDICINE

## 2021-07-26 RX ORDER — CYANOCOBALAMIN 1000 UG/ML
1000 INJECTION INTRAMUSCULAR; SUBCUTANEOUS ONCE
Status: COMPLETED | OUTPATIENT
Start: 2021-07-26 | End: 2021-07-26

## 2021-07-26 RX ORDER — CYANOCOBALAMIN 1000 UG/ML
1000 INJECTION INTRAMUSCULAR; SUBCUTANEOUS ONCE
Status: CANCELLED | OUTPATIENT
Start: 2021-08-09

## 2021-07-26 RX ADMIN — CYANOCOBALAMIN 1000 MCG: 1000 INJECTION INTRAMUSCULAR; SUBCUTANEOUS at 15:45:00

## 2021-07-26 NOTE — PROGRESS NOTES
Pt here for C17D1.   Arrives Ambulating independently, accompanied by Self           Pregnancy screening: Denies possibility of pregnancy    Modifications in dose or schedule: No     Frequency of blood return and site check throughout administration: Prior

## 2021-07-26 NOTE — PROGRESS NOTES
Cancer Center Progress Note    Patient Name: Good Abdul   YOB: 1945   Medical Record Number: [id number]   CSN: [id number]   Attending Physician: Dax Polk M.D.    Referring Physician: DR Fely BERRY      Date of Visit: 7/26/2021 overall stable. 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 History of Present Illness:  Feels more fatigue and with increased cough. Just saw his cardiologist and reports heart-wise doing ok.  Abd MG Oral Capsule Delayed Release, Take 20 mg by mouth every morning., Disp: , Rfl:     Past Medical History:  Past Medical History:   Diagnosis Date   • Arthritis several years   • Atherosclerosis of coronary artery    • Back pain several years   • Cancer ( Spouse name: Not on file      Number of children: Not on file      Years of education: Not on file      Highest education level: Not on file    Occupational History      Not on file    Tobacco Use      Smoking status: Former Smoker        Packs/day: 2.00 Patient is alert and oriented x 3, not in acute distress. Afebrile  Psych:  Mood and affect appropriate  HEENT: EOMs intact. PERRL. Oropharynx is clear. Neck: No JVD. No palpable lymphadenopathy. Neck is supple. Lymphatics:  There is no palpable lymphade ONLY)(GYP=58088/39502), 1/18/2021, 9:20 AM.       INDICATIONS:  C34.81 Malignant neoplasm of overlapping sites of right lung ([facility])       TECHNIQUE:  IV contrast-enhanced scanning through the chest, abdomen, and pelvis was performed. [employer] reduction techniq aorta iliac vessels. RETROPERITONEUM:  A portal caval lymph node is increased in size measuring 26 x 20 mm were previously measured (22 x 17 mm) on image number 183.  A lymph node just anterior to the bifurcation of the celiac axis measures 28 x 18 mm whi (ONCOLOGY) (CPT=78815), 10/23/2020, 9:49 AM.     INDICATIONS:  C34.81 Malignant neoplasm of overlapping sites of right lung ([facility] [address]     TECHNIQUE:  IV contrast-enhanced scanning through the chest, abdomen, and pelvis was performed.   Dose reduction techniques and proper hepatic artery measures 1.9 x 1.8 cm on image 28 of series 2, previously 2.7 x 2.5 cm. Lymph node at the branch point of the celiac artery between the proper hepatic and splenic   arteries measures 2.1 x 2.1 cm on image 206, previously 1.5 x 1. HR:  55bpm   Loc:  [facility]        Gndr: M          BSA: 2.09m^2     Sonographer: Svetlana FIELD   Ordering:   Nalini Carvajal       Referring: Russ Bones I     ----------------------------------------------------------------------------   History/Indica consistent with a pseudonormal left ventricular filling   pattern, with concomitant abnormal relaxation and increased filling pressure   - grade 2 diastolic dysfunction. Left atrium:  The left atrium was normal in size.  The left atrial volume was   ebenezer   cm     4.2 - 5.9    LV ID, ES, PLAX                         2.9   cm     ---------    LV fx shortening, PLAX                  38    %      25 - 43    LV mid-wall fx shortening, PLAX         17    %      14 - 22    LV PW thickness, ED, PLAX               valve                         Value        Reference    Tricuspid regurg peak velocity          2.47  m/sec  ---------    Tricuspid peak RV-RA gradient           24    mm Hg  ---------        Systemic veins                          Value        Reference lately      Proceed with Pembro today. Labs reviewed. PET ordered to be done before next visit    RTC 3 weeks. Risk level: High- metastatic lung cancer on IO.        [facility]

## 2021-08-01 ENCOUNTER — LAB SERVICES (OUTPATIENT)
Dept: LAB | Age: 76
End: 2021-08-01

## 2021-08-01 DIAGNOSIS — Z01.812 PRE-PROCEDURE LAB EXAM: Primary | ICD-10-CM

## 2021-08-01 PROCEDURE — U0003 INFECTIOUS AGENT DETECTION BY NUCLEIC ACID (DNA OR RNA); SEVERE ACUTE RESPIRATORY SYNDROME CORONAVIRUS 2 (SARS-COV-2) (CORONAVIRUS DISEASE [COVID-19]), AMPLIFIED PROBE TECHNIQUE, MAKING USE OF HIGH THROUGHPUT TECHNOLOGIES AS DESCRIBED BY CMS-2020-01-R: HCPCS | Performed by: CLINICAL MEDICAL LABORATORY

## 2021-08-01 PROCEDURE — U0005 INFEC AGEN DETEC AMPLI PROBE: HCPCS | Performed by: CLINICAL MEDICAL LABORATORY

## 2021-08-02 VITALS — HEIGHT: 69 IN | BODY MASS INDEX: 28.88 KG/M2 | WEIGHT: 195 LBS

## 2021-08-02 LAB
SARS-COV-2 RNA RESP QL NAA+PROBE: NOT DETECTED
SERVICE CMNT-IMP: NORMAL
SERVICE CMNT-IMP: NORMAL

## 2021-08-10 ENCOUNTER — HOSPITAL ENCOUNTER (OUTPATIENT)
Dept: NUCLEAR MEDICINE | Facility: HOSPITAL | Age: 76
Discharge: HOME | End: 2021-08-10
Attending: INTERNAL MEDICINE
Payer: MEDICARE

## 2021-08-10 ENCOUNTER — HOSPITAL ENCOUNTER (OUTPATIENT)
Dept: NUCLEAR MEDICINE | Facility: HOSPITAL | Age: 76
End: 2021-08-10
Attending: INTERNAL MEDICINE
Payer: MEDICARE

## 2021-08-10 DIAGNOSIS — C34.81 MALIGNANT NEOPLASM OF OVERLAPPING SITES OF RIGHT LUNG (HCC): ICD-10-CM

## 2021-08-10 LAB — GLUCOSE BLD-MCNC: 216 MG/DL (ref 70–99)

## 2021-08-10 PROCEDURE — 82962 GLUCOSE BLOOD TEST: CPT

## 2021-08-13 ENCOUNTER — ANESTHESIA EVENT (OUTPATIENT)
Dept: SURGERY | Age: 76
End: 2021-08-13

## 2021-08-15 ENCOUNTER — LAB SERVICES (OUTPATIENT)
Dept: LAB | Age: 76
End: 2021-08-15

## 2021-08-15 DIAGNOSIS — Z01.812 PRE-PROCEDURE LAB EXAM: Primary | ICD-10-CM

## 2021-08-15 PROCEDURE — U0003 INFECTIOUS AGENT DETECTION BY NUCLEIC ACID (DNA OR RNA); SEVERE ACUTE RESPIRATORY SYNDROME CORONAVIRUS 2 (SARS-COV-2) (CORONAVIRUS DISEASE [COVID-19]), AMPLIFIED PROBE TECHNIQUE, MAKING USE OF HIGH THROUGHPUT TECHNOLOGIES AS DESCRIBED BY CMS-2020-01-R: HCPCS | Performed by: CLINICAL MEDICAL LABORATORY

## 2021-08-15 PROCEDURE — U0005 INFEC AGEN DETEC AMPLI PROBE: HCPCS | Performed by: CLINICAL MEDICAL LABORATORY

## 2021-08-16 ENCOUNTER — OFFICE VISIT (OUTPATIENT)
Dept: HEMATOLOGY/ONCOLOGY | Facility: HOSPITAL | Age: 76
End: 2021-08-16
Attending: INTERNAL MEDICINE
Payer: MEDICARE

## 2021-08-16 VITALS
TEMPERATURE: 97 F | WEIGHT: 191 LBS | HEIGHT: 68.9 IN | DIASTOLIC BLOOD PRESSURE: 70 MMHG | HEART RATE: 65 BPM | BODY MASS INDEX: 28.29 KG/M2 | SYSTOLIC BLOOD PRESSURE: 119 MMHG | OXYGEN SATURATION: 98 % | RESPIRATION RATE: 16 BRPM

## 2021-08-16 DIAGNOSIS — C78.7 LIVER METASTASES (HCC): ICD-10-CM

## 2021-08-16 DIAGNOSIS — R53.0 NEOPLASTIC MALIGNANT RELATED FATIGUE: ICD-10-CM

## 2021-08-16 DIAGNOSIS — R59.9 ADENOPATHY: ICD-10-CM

## 2021-08-16 DIAGNOSIS — C34.81 MALIGNANT NEOPLASM OF OVERLAPPING SITES OF RIGHT LUNG (HCC): Primary | ICD-10-CM

## 2021-08-16 DIAGNOSIS — R59.0 HILAR ADENOPATHY: ICD-10-CM

## 2021-08-16 DIAGNOSIS — R19.8 GI SYMPTOMS: ICD-10-CM

## 2021-08-16 DIAGNOSIS — T88.7XXA MEDICATION SIDE EFFECTS: ICD-10-CM

## 2021-08-16 DIAGNOSIS — Z51.12 ENCOUNTER FOR ANTINEOPLASTIC IMMUNOTHERAPY: ICD-10-CM

## 2021-08-16 DIAGNOSIS — E87.1 HYPONATREMIA: ICD-10-CM

## 2021-08-16 LAB
ALBUMIN SERPL-MCNC: 3.1 G/DL (ref 3.4–5)
ALBUMIN/GLOB SERPL: 0.8 {RATIO} (ref 1–2)
ALP LIVER SERPL-CCNC: 89 U/L
ALT SERPL-CCNC: 16 U/L
ANION GAP SERPL CALC-SCNC: 3 MMOL/L (ref 0–18)
AST SERPL-CCNC: 14 U/L (ref 15–37)
BASOPHILS # BLD AUTO: 0.04 X10(3) UL (ref 0–0.2)
BASOPHILS NFR BLD AUTO: 0.4 %
BILIRUB SERPL-MCNC: 0.5 MG/DL (ref 0.1–2)
BUN BLD-MCNC: 26 MG/DL (ref 7–18)
CALCIUM BLD-MCNC: 9 MG/DL (ref 8.5–10.1)
CEA SERPL-MCNC: 5 NG/ML (ref ?–5)
CHLORIDE SERPL-SCNC: 104 MMOL/L (ref 98–112)
CO2 SERPL-SCNC: 25 MMOL/L (ref 21–32)
CREAT BLD-MCNC: 1.06 MG/DL
EOSINOPHIL # BLD AUTO: 0.13 X10(3) UL (ref 0–0.7)
EOSINOPHIL NFR BLD AUTO: 1.4 %
ERYTHROCYTE [DISTWIDTH] IN BLOOD BY AUTOMATED COUNT: 14 %
GLOBULIN PLAS-MCNC: 3.9 G/DL (ref 2.8–4.4)
GLUCOSE BLD-MCNC: 242 MG/DL (ref 70–99)
HCT VFR BLD AUTO: 40.9 %
HGB BLD-MCNC: 13 G/DL
IMM GRANULOCYTES # BLD AUTO: 0.02 X10(3) UL (ref 0–1)
IMM GRANULOCYTES NFR BLD: 0.2 %
LYMPHOCYTES # BLD AUTO: 4.29 X10(3) UL (ref 1–4)
LYMPHOCYTES NFR BLD AUTO: 46.8 %
M PROTEIN MFR SERPL ELPH: 7 G/DL (ref 6.4–8.2)
MCH RBC QN AUTO: 27.7 PG (ref 26–34)
MCHC RBC AUTO-ENTMCNC: 31.8 G/DL (ref 31–37)
MCV RBC AUTO: 87 FL
MONOCYTES # BLD AUTO: 0.39 X10(3) UL (ref 0.1–1)
MONOCYTES NFR BLD AUTO: 4.3 %
NEUTROPHILS # BLD AUTO: 4.3 X10 (3) UL (ref 1.5–7.7)
NEUTROPHILS # BLD AUTO: 4.3 X10(3) UL (ref 1.5–7.7)
NEUTROPHILS NFR BLD AUTO: 46.9 %
OSMOLALITY SERPL CALC.SUM OF ELEC: 287 MOSM/KG (ref 275–295)
PATIENT FASTING Y/N/NP: NO
PLATELET # BLD AUTO: 257 10(3)UL (ref 150–450)
POTASSIUM SERPL-SCNC: 4.7 MMOL/L (ref 3.5–5.1)
RBC # BLD AUTO: 4.7 X10(6)UL
SARS-COV-2 RNA RESP QL NAA+PROBE: NOT DETECTED
SERVICE CMNT-IMP: NORMAL
SERVICE CMNT-IMP: NORMAL
SODIUM SERPL-SCNC: 132 MMOL/L (ref 136–145)
TSI SER-ACNC: 1.2 MIU/ML (ref 0.36–3.74)
WBC # BLD AUTO: 9.2 X10(3) UL (ref 4–11)

## 2021-08-16 PROCEDURE — 99215 OFFICE O/P EST HI 40 MIN: CPT | Performed by: INTERNAL MEDICINE

## 2021-08-16 PROCEDURE — 96413 CHEMO IV INFUSION 1 HR: CPT

## 2021-08-16 NOTE — PROGRESS NOTES
Cancer Center Progress Note    Patient Name: Germán Thomas   YOB: 1945   Medical Record Number: [id number]   CSN: [id number]   Attending Physician: Bryan Mo M.D.    Referring Physician: DR Vianney BERRY      Date of Visit: 8/16/2021 Flow cytometry showed presence of a monotypic B cell population. 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 History of Present Illness:  PET had to be rescheduled for Wednesday as glucose levels were high. Feels about the same. Main complaint is fatigue.  He al omeprazole (PRILOSEC) 20 MG Oral Capsule Delayed Release, Take 20 mg by mouth every morning., Disp: , Rfl:     Past Medical History:  Past Medical History:   Diagnosis Date   • Arthritis several years   • Atherosclerosis of coronary artery    • Back pain s status:       Spouse name: Not on file      Number of children: Not on file      Years of education: Not on file      Highest education level: Not on file    Occupational History      Not on file    Tobacco Use      Smoking status: Former Smoker Patient is alert and oriented x 3, not in acute distress. Afebrile  Psych:  Mood and affect appropriate  HEENT: EOMs intact. PERRL. Oropharynx is clear. Neck: No JVD. No palpable lymphadenopathy. Neck is supple. Lymphatics:  There is no palpable lymphade Prelim 4.30 1.50 - 7.70 x10 (3) uL    Neutrophil Absolute 4.30 1.50 - 7.70 x10(3) uL    Lymphocyte Absolute 4.29 (H) 1.00 - 4.00 x10(3) uL    Monocyte Absolute 0.39 0.10 - 1.00 x10(3) uL    Eosinophil Absolute 0.13 0.00 - 0.70 x10(3) uL    Basophil Absolut ABDOMEN/PELVIS:   LIVER:  Multiple calcified granulomas throughout the liver.    BILIARY:  No visible dilatation or calcification.     PANCREAS:  No lesion, fluid collection, ductal dilatation, or atrophy.     SPLEEN:  Multiple calcified granulomas throug disease.  Possibility of other etiologies such as lymphoma cannot be completely excluded.       2. Stable soft tissue thickening along the right upper lobe bronchus likely related to post treatment changes of patient's known lung cancer.       3. Stable bl attenuation. ABDOMEN/PELVIS:  LIVER:  No enlargement, atrophy, abnormal density, or significant focal lesion. Multiple small calcified granulomas are again demonstrated. BILIARY:  No visible dilatation or calcification.     PANCREAS:  No lesion, flu stable. 2. Stable small right paratracheal lymph node.   3. Increase in diameter of the appendix with development of intraluminal debris is noted without appendiceal wall thickening or periappendiceal inflammatory changes to indicate appendicitis, this fin pattern, with concomitant abnormal relaxation and increased filling      pressure - grade 2 diastolic dysfunction.    2. Left atrium: The left atrial volume was normal.     Impressions:  This study is compared with previous dated 12/07/2018:   Compared to t was no pericardial effusion. Aorta:  Aortic root: The aortic root was normal.   Ascending aorta: The ascending aorta was normal.   Pulmonary artery:   The main pulmonary artery was normal-sized.  Systolic   pressure was within the normal range, estimated      23    ml/m^2 ---------    LA volume, ES, 1-p A2C                  41    ml     18 - 58    LA volume/bsa, ES, 1-p A2C              20    ml/m^2 ---------        Mitral valve                            Value        Reference    Mitral E-wave peak veloci JAY- he will continue to follow-up with dermatology for full body skin exams on a regular basis. 4. CAD s/p CABG and multiple PCI- continue current cardiac regimen. ECHO just done showed no change from 2018. 5.  MBL- CBC showed a normal WBC and mild

## 2021-08-16 NOTE — PROGRESS NOTES
Pt here for C18D1.   Arrives Ambulating independently, accompanied by Self           Pregnancy screening: Denies possibility of pregnancy    Modifications in dose or schedule: No     Frequency of blood return and site check throughout administration: Prior

## 2021-08-17 ENCOUNTER — HOSPITAL ENCOUNTER (OUTPATIENT)
Age: 76
Discharge: HOME | End: 2021-08-17
Attending: OPHTHALMOLOGY | Admitting: OPHTHALMOLOGY

## 2021-08-17 ENCOUNTER — ANESTHESIA (OUTPATIENT)
Dept: SURGERY | Age: 76
End: 2021-08-17

## 2021-08-17 VITALS
BODY MASS INDEX: 27.2 KG/M2 | RESPIRATION RATE: 17 BRPM | SYSTOLIC BLOOD PRESSURE: 129 MMHG | OXYGEN SATURATION: 97 % | DIASTOLIC BLOOD PRESSURE: 60 MMHG | HEIGHT: 70 IN | WEIGHT: 190 LBS | HEART RATE: 69 BPM | TEMPERATURE: 97.2 F

## 2021-08-17 LAB
GLUCOSE BLDC GLUCOMTR-MCNC: 131 MG/DL (ref 70–99)
GLUCOSE BLDC GLUCOMTR-MCNC: 146 MG/DL (ref 70–99)

## 2021-08-17 PROCEDURE — 82962 GLUCOSE BLOOD TEST: CPT

## 2021-08-17 PROCEDURE — 13000001 HB PHASE II RECOVERY EA 30 MINUTES: Performed by: OPHTHALMOLOGY

## 2021-08-17 PROCEDURE — 13000006 HB ANESTHESIA MAC S/U + 1ST 15 MIN: Performed by: OPHTHALMOLOGY

## 2021-08-17 PROCEDURE — 10002807 HB RX 258: Performed by: OPHTHALMOLOGY

## 2021-08-17 PROCEDURE — 10002801 HB RX 250 W/O HCPCS: Performed by: OPHTHALMOLOGY

## 2021-08-17 PROCEDURE — 13000035 HB BASIC CASE EA ADD MINUTE: Performed by: OPHTHALMOLOGY

## 2021-08-17 PROCEDURE — 10002803 HB RX 637: Performed by: OPHTHALMOLOGY

## 2021-08-17 PROCEDURE — 10006023 HB SUPPLY 272: Performed by: OPHTHALMOLOGY

## 2021-08-17 PROCEDURE — 13000034 HB BASIC CASE  S/U +1ST 15 MIN: Performed by: OPHTHALMOLOGY

## 2021-08-17 PROCEDURE — 10002801 HB RX 250 W/O HCPCS

## 2021-08-17 PROCEDURE — 10002800 HB RX 250 W HCPCS: Performed by: ANESTHESIOLOGY

## 2021-08-17 PROCEDURE — 13000007 HB ANESTHESIA MAC EA ADD MINUTE: Performed by: OPHTHALMOLOGY

## 2021-08-17 RX ORDER — LIDOCAINE HYDROCHLORIDE AND EPINEPHRINE 10; 10 MG/ML; UG/ML
INJECTION, SOLUTION INFILTRATION; PERINEURAL PRN
Status: DISCONTINUED | OUTPATIENT
Start: 2021-08-17 | End: 2021-08-17 | Stop reason: HOSPADM

## 2021-08-17 RX ORDER — SODIUM CHLORIDE, SODIUM LACTATE, POTASSIUM CHLORIDE, CALCIUM CHLORIDE 600; 310; 30; 20 MG/100ML; MG/100ML; MG/100ML; MG/100ML
INJECTION, SOLUTION INTRAVENOUS CONTINUOUS
Status: DISCONTINUED | OUTPATIENT
Start: 2021-08-17 | End: 2021-08-17 | Stop reason: HOSPADM

## 2021-08-17 RX ORDER — BUPIVACAINE HYDROCHLORIDE 5 MG/ML
INJECTION, SOLUTION EPIDURAL; INTRACAUDAL PRN
Status: DISCONTINUED | OUTPATIENT
Start: 2021-08-17 | End: 2021-08-17 | Stop reason: HOSPADM

## 2021-08-17 RX ORDER — MIDAZOLAM HYDROCHLORIDE 1 MG/ML
INJECTION, SOLUTION INTRAMUSCULAR; INTRAVENOUS PRN
Status: DISCONTINUED | OUTPATIENT
Start: 2021-08-17 | End: 2021-08-17

## 2021-08-17 RX ADMIN — MIDAZOLAM HYDROCHLORIDE 1 MG: 1 INJECTION, SOLUTION INTRAMUSCULAR; INTRAVENOUS at 11:22

## 2021-08-17 RX ADMIN — PROPOFOL 50 MCG/KG/MIN: 10 INJECTION, EMULSION INTRAVENOUS at 11:17

## 2021-08-17 RX ADMIN — MIDAZOLAM HYDROCHLORIDE 1 MG: 1 INJECTION, SOLUTION INTRAMUSCULAR; INTRAVENOUS at 11:14

## 2021-08-17 RX ADMIN — SODIUM CHLORIDE, SODIUM LACTATE, POTASSIUM CHLORIDE, AND CALCIUM CHLORIDE: .6; .31; .03; .02 INJECTION, SOLUTION INTRAVENOUS at 11:09

## 2021-08-17 SDOH — SOCIAL STABILITY: SOCIAL INSECURITY: RISK FACTORS: HEART DISEASE

## 2021-08-17 SDOH — SOCIAL STABILITY: SOCIAL INSECURITY: RISK FACTORS: AGE

## 2021-08-17 ASSESSMENT — ACTIVITIES OF DAILY LIVING (ADL)
ADL_BEFORE_ADMISSION: INDEPENDENT
CHRONIC_PAIN_PRESENT: NO
SENSORY_SUPPORT_DEVICES: EYEGLASSES
NEEDS_ASSIST: NO
ADL_SCORE: 12
ADL_SHORT_OF_BREATH: NO
HISTORY OF FALLING IN THE LAST YEAR (PRIOR TO ADMISSION): NO
RECENT_DECLINE_ADL: NO

## 2021-08-17 ASSESSMENT — COGNITIVE AND FUNCTIONAL STATUS - GENERAL: ARE YOU DEAF OR DO YOU HAVE SERIOUS DIFFICULTY  HEARING: NO

## 2021-08-17 ASSESSMENT — PAIN SCALES - GENERAL
PAINLEVEL_OUTOF10: 0

## 2021-08-25 ENCOUNTER — HOSPITAL ENCOUNTER (OUTPATIENT)
Dept: NUCLEAR MEDICINE | Facility: HOSPITAL | Age: 76
Discharge: HOME | End: 2021-08-25
Attending: INTERNAL MEDICINE
Payer: MEDICARE

## 2021-08-25 LAB — GLUCOSE BLD-MCNC: 117 MG/DL (ref 70–99)

## 2021-08-25 PROCEDURE — 78815 PET IMAGE W/CT SKULL-THIGH: CPT | Performed by: INTERNAL MEDICINE

## 2021-08-25 PROCEDURE — 82962 GLUCOSE BLOOD TEST: CPT

## 2021-08-31 ENCOUNTER — TELEPHONE (OUTPATIENT)
Dept: HEMATOLOGY/ONCOLOGY | Facility: HOSPITAL | Age: 76
End: 2021-08-31

## 2021-08-31 DIAGNOSIS — C34.81 MALIGNANT NEOPLASM OF OVERLAPPING SITES OF RIGHT LUNG (HCC): Primary | ICD-10-CM

## 2021-08-31 NOTE — TELEPHONE ENCOUNTER
Patient calling and had a PET scan last week. Received a [employer] Message from Fr. Katia North last Thursday with the results, that PT stated were not to good, and That Dr Katia North would call him.     PT stated he hasnt received a call

## 2021-08-31 NOTE — PROGRESS NOTES
Central scheduling attempted calling IR to get a date/time for STAT US bx of LN. Awaiting callback from dept, will call RN and pt with date/time.

## 2021-08-31 NOTE — TELEPHONE ENCOUNTER
Spoke to Maureen Slaughter. Went over PET results. Some new areas of worsening (mainly annabel and rib lesion with no correlate) but still no evidence of disease back in lungs/liver which he had before. Will proceed with biopsy of left supraclav node (one of new areas).

## 2021-09-01 ENCOUNTER — LAB ENCOUNTER (OUTPATIENT)
Dept: LAB | Age: 76
End: 2021-09-01
Attending: INTERNAL MEDICINE
Payer: MEDICARE

## 2021-09-01 ENCOUNTER — ORDER TRANSCRIPTION (OUTPATIENT)
Dept: ADMINISTRATIVE | Facility: HOSPITAL | Age: 76
End: 2021-09-01

## 2021-09-01 DIAGNOSIS — Z01.818 PREOP EXAMINATION: ICD-10-CM

## 2021-09-01 DIAGNOSIS — Z01.818 PREOP EXAMINATION: Primary | ICD-10-CM

## 2021-09-01 DIAGNOSIS — Z11.59 ENCOUNTER FOR SCREENING FOR OTHER VIRAL DISEASES: ICD-10-CM

## 2021-09-02 LAB — SARS-COV-2 RNA RESP QL NAA+PROBE: NOT DETECTED

## 2021-09-03 ENCOUNTER — HOSPITAL ENCOUNTER (OUTPATIENT)
Dept: ULTRASOUND IMAGING | Facility: HOSPITAL | Age: 76
Discharge: HOME | End: 2021-09-03
Attending: INTERNAL MEDICINE
Payer: MEDICARE

## 2021-09-03 DIAGNOSIS — C34.81 MALIGNANT NEOPLASM OF OVERLAPPING SITES OF RIGHT LUNG (HCC): ICD-10-CM

## 2021-09-03 PROCEDURE — 88305 TISSUE EXAM BY PATHOLOGIST: CPT | Performed by: INTERNAL MEDICINE

## 2021-09-03 PROCEDURE — 88342 IMHCHEM/IMCYTCHM 1ST ANTB: CPT | Performed by: INTERNAL MEDICINE

## 2021-09-03 PROCEDURE — 88341 IMHCHEM/IMCYTCHM EA ADD ANTB: CPT | Performed by: INTERNAL MEDICINE

## 2021-09-03 PROCEDURE — 76942 ECHO GUIDE FOR BIOPSY: CPT | Performed by: INTERNAL MEDICINE

## 2021-09-03 PROCEDURE — 38505 NEEDLE BIOPSY LYMPH NODES: CPT | Performed by: INTERNAL MEDICINE

## 2021-09-03 NOTE — PROCEDURES
[facility]  Procedure Note    [facility] SURGERY Patient Status:  Outpatient    2/3/1945 MRN [id number]   Location [address] [place] [address] Attending Asif Pritchett MD   Hosp Day # 0 PCP Viridiana BERRY     Procedure: US guided lymph node biopsy    P

## 2021-09-07 ENCOUNTER — APPOINTMENT (OUTPATIENT)
Dept: HEMATOLOGY/ONCOLOGY | Facility: HOSPITAL | Age: 76
End: 2021-09-07
Attending: INTERNAL MEDICINE
Payer: MEDICARE

## 2021-09-07 ENCOUNTER — APPOINTMENT (OUTPATIENT)
Dept: HEMATOLOGY/ONCOLOGY | Age: 76
End: 2021-09-07
Attending: INTERNAL MEDICINE
Payer: MEDICARE

## 2021-09-08 ENCOUNTER — APPOINTMENT (OUTPATIENT)
Dept: HEMATOLOGY/ONCOLOGY | Facility: HOSPITAL | Age: 76
End: 2021-09-08
Attending: INTERNAL MEDICINE
Payer: MEDICARE

## 2021-09-08 VITALS
TEMPERATURE: 98 F | OXYGEN SATURATION: 98 % | DIASTOLIC BLOOD PRESSURE: 61 MMHG | SYSTOLIC BLOOD PRESSURE: 128 MMHG | WEIGHT: 190.5 LBS | BODY MASS INDEX: 29.21 KG/M2 | HEART RATE: 57 BPM | RESPIRATION RATE: 17 BRPM | HEIGHT: 67.6 IN

## 2021-09-08 DIAGNOSIS — D72.820 MONOCLONAL B-CELL LYMPHOCYTOSIS: ICD-10-CM

## 2021-09-08 DIAGNOSIS — R59.0 HILAR ADENOPATHY: ICD-10-CM

## 2021-09-08 DIAGNOSIS — C34.81 MALIGNANT NEOPLASM OF OVERLAPPING SITES OF RIGHT LUNG (HCC): Primary | ICD-10-CM

## 2021-09-08 DIAGNOSIS — C78.7 LIVER METASTASES (HCC): ICD-10-CM

## 2021-09-08 DIAGNOSIS — M89.9 RIB LESION: ICD-10-CM

## 2021-09-08 DIAGNOSIS — R53.0 NEOPLASTIC MALIGNANT RELATED FATIGUE: ICD-10-CM

## 2021-09-08 DIAGNOSIS — R59.9 ADENOPATHY: ICD-10-CM

## 2021-09-08 DIAGNOSIS — Z51.12 ENCOUNTER FOR ANTINEOPLASTIC IMMUNOTHERAPY: ICD-10-CM

## 2021-09-08 DIAGNOSIS — E87.1 HYPONATREMIA: ICD-10-CM

## 2021-09-08 DIAGNOSIS — T88.7XXA MEDICATION SIDE EFFECTS: ICD-10-CM

## 2021-09-08 DIAGNOSIS — R19.8 GI SYMPTOMS: ICD-10-CM

## 2021-09-08 LAB
ALBUMIN SERPL-MCNC: 2.9 G/DL (ref 3.4–5)
ALBUMIN/GLOB SERPL: 0.7 {RATIO} (ref 1–2)
ALP LIVER SERPL-CCNC: 86 U/L
ALT SERPL-CCNC: 14 U/L
ANION GAP SERPL CALC-SCNC: 5 MMOL/L (ref 0–18)
AST SERPL-CCNC: 11 U/L (ref 15–37)
BASOPHILS # BLD AUTO: 0.03 X10(3) UL (ref 0–0.2)
BASOPHILS NFR BLD AUTO: 0.3 %
BILIRUB SERPL-MCNC: 0.4 MG/DL (ref 0.1–2)
BUN BLD-MCNC: 26 MG/DL (ref 7–18)
CALCIUM BLD-MCNC: 9.3 MG/DL (ref 8.5–10.1)
CHLORIDE SERPL-SCNC: 103 MMOL/L (ref 98–112)
CO2 SERPL-SCNC: 27 MMOL/L (ref 21–32)
CREAT BLD-MCNC: 1.08 MG/DL
EOSINOPHIL # BLD AUTO: 0.18 X10(3) UL (ref 0–0.7)
EOSINOPHIL NFR BLD AUTO: 2.1 %
ERYTHROCYTE [DISTWIDTH] IN BLOOD BY AUTOMATED COUNT: 14 %
GLOBULIN PLAS-MCNC: 4.1 G/DL (ref 2.8–4.4)
GLUCOSE BLD-MCNC: 245 MG/DL (ref 70–99)
HCT VFR BLD AUTO: 39.3 %
HGB BLD-MCNC: 12.6 G/DL
IMM GRANULOCYTES # BLD AUTO: 0.03 X10(3) UL (ref 0–1)
IMM GRANULOCYTES NFR BLD: 0.3 %
LYMPHOCYTES # BLD AUTO: 3.9 X10(3) UL (ref 1–4)
LYMPHOCYTES NFR BLD AUTO: 45.5 %
M PROTEIN MFR SERPL ELPH: 7 G/DL (ref 6.4–8.2)
MCH RBC QN AUTO: 27.7 PG (ref 26–34)
MCHC RBC AUTO-ENTMCNC: 32.1 G/DL (ref 31–37)
MCV RBC AUTO: 86.4 FL
MONOCYTES # BLD AUTO: 0.38 X10(3) UL (ref 0.1–1)
MONOCYTES NFR BLD AUTO: 4.4 %
NEUTROPHILS # BLD AUTO: 4.06 X10 (3) UL (ref 1.5–7.7)
NEUTROPHILS # BLD AUTO: 4.06 X10(3) UL (ref 1.5–7.7)
NEUTROPHILS NFR BLD AUTO: 47.4 %
OSMOLALITY SERPL CALC.SUM OF ELEC: 293 MOSM/KG (ref 275–295)
PATIENT FASTING Y/N/NP: NO
PLATELET # BLD AUTO: 276 10(3)UL (ref 150–450)
POTASSIUM SERPL-SCNC: 4.2 MMOL/L (ref 3.5–5.1)
RBC # BLD AUTO: 4.55 X10(6)UL
SODIUM SERPL-SCNC: 135 MMOL/L (ref 136–145)
T4 FREE SERPL-MCNC: 1.3 NG/DL (ref 0.8–1.7)
TSI SER-ACNC: 1.75 MIU/ML (ref 0.36–3.74)
WBC # BLD AUTO: 8.6 X10(3) UL (ref 4–11)

## 2021-09-08 PROCEDURE — 96413 CHEMO IV INFUSION 1 HR: CPT

## 2021-09-08 PROCEDURE — 99215 OFFICE O/P EST HI 40 MIN: CPT | Performed by: INTERNAL MEDICINE

## 2021-09-08 PROCEDURE — 84439 ASSAY OF FREE THYROXINE: CPT

## 2021-09-08 PROCEDURE — 80053 COMPREHEN METABOLIC PANEL: CPT

## 2021-09-08 PROCEDURE — 96372 THER/PROPH/DIAG INJ SC/IM: CPT

## 2021-09-08 PROCEDURE — 84443 ASSAY THYROID STIM HORMONE: CPT

## 2021-09-08 PROCEDURE — 85025 COMPLETE CBC W/AUTO DIFF WBC: CPT

## 2021-09-08 RX ORDER — CYANOCOBALAMIN 1000 UG/ML
1000 INJECTION INTRAMUSCULAR; SUBCUTANEOUS ONCE
Status: COMPLETED | OUTPATIENT
Start: 2021-09-08 | End: 2021-09-08

## 2021-09-08 RX ORDER — PROCHLORPERAZINE MALEATE 10 MG
10 TABLET ORAL EVERY 6 HOURS PRN
Qty: 30 TABLET | Refills: 3 | Status: SHIPPED | OUTPATIENT
Start: 2021-09-08 | End: 2021-12-22

## 2021-09-08 RX ORDER — CYANOCOBALAMIN 1000 UG/ML
1000 INJECTION INTRAMUSCULAR; SUBCUTANEOUS ONCE
Status: CANCELLED | OUTPATIENT
Start: 2021-09-08

## 2021-09-08 RX ORDER — ONDANSETRON HYDROCHLORIDE 8 MG/1
8 TABLET, FILM COATED ORAL EVERY 8 HOURS PRN
Qty: 30 TABLET | Refills: 3 | Status: SHIPPED | OUTPATIENT
Start: 2021-09-08 | End: 2021-12-22

## 2021-09-08 RX ORDER — FOLIC ACID 1 MG/1
1 TABLET ORAL DAILY
Qty: 90 TABLET | Refills: 3 | Status: SHIPPED | OUTPATIENT
Start: 2021-09-08 | End: 2021-09-21

## 2021-09-08 RX ADMIN — CYANOCOBALAMIN 1000 MCG: 1000 INJECTION INTRAMUSCULAR; SUBCUTANEOUS at 10:07:00

## 2021-09-08 NOTE — PROGRESS NOTES
Cancer Center Progress Note    Patient Name: Jose Weber   YOB: 1945   Medical Record Number: [id number]   CSN: [id number]   Attending Physician: Teresa Watkins M.D.    Referring Physician: Julianne BERRY      Date of Visit: 9/8/2021     Chi cytometry showed presence of a monotypic B cell population. 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        History of Present Illness:  Since I last saw Mr. Nicko Bae, he had his PET scan done 8/25/21 which showed abnormal uptake involving a left supraclavicular, lo diagnosis.      Ancillary Studies     Immunohistochemistry:     Material:  Block A2  Population:  Tissue     Antibody                                        Result  CKAE1/3                          See Comment  P40                                   See Com Prochlorperazine Maleate (COMPAZINE) 10 mg tablet, Take 1 tablet (10 mg total) by mouth every 6 (six) hours as needed for Nausea., Disp: 30 tablet, Rfl: 3  •  ondansetron (ZOFRAN) 8 MG tablet, Take 1 tablet (8 mg total) by mouth every 8 (eight) hours as ne Date   • Arthritis several years   • Atherosclerosis of coronary artery    • Back pain several years   • Cancer [facility])    • Chest pain on exertion on occasion   • Diabetes mellitus (HCC) 20 years   • Easy bruising on placid/aleve   • Esophageal reflux    • Occupational History      Not on file    Tobacco Use      Smoking status: Former Smoker        Packs/day: 2.00        Years: 50.00        Pack years: 100        Quit date: 3/5/2010        Years since quittin.5      Smokeless tobacco: Never Used    Vap auscultation. Heart: Regular rate and rhythm. Abdomen: Less distention, soft, non tender with good bowel sounds. No hepatosplenomegaly. No palpable mass. Extremities: Pedal pulses are present. No BLE edema. Neurological: Grossly intact.        Jamie Galdamez  [facility] , CT, CT CHEST+ABDOMEN+PELVIS(ALL CNTRST ONLY)(CPT=71260/61996), 4/22/2021, 9:29 AM.  BEAR CRUZ, PET/CT STANDARD BODY SCAN (ONCOLOGY) (CPT=78815), 10/23/2020, 9:49 AM.       INDICATIONS:  C34.81 Malignant neoplasm of overlapping sites of right wilder further details.  Anterior to the left femoral neck is abnormal uptake with a maximum SUV of 3.1 may represent an inflammatory/infectious process.                        Impression   CONCLUSION:     1.  When compared to most recent PET performed 10/23/2020, or dissection.     VASCULATURE:  No visible pulmonary arterial thrombus or attenuation.         ABDOMEN/PELVIS:   LIVER:  Multiple calcified granulomas throughout the liver.    BILIARY:  No visible dilatation or calcification.     PANCREAS:  No lesion, flui 1. Increasing size of retroperitoneal lymphadenopathy concerning for worsening metastatic disease.  Possibility of other etiologies such as lymphoma cannot be completely excluded.       2. Stable soft tissue thickening along the right upper lobe bronchus atherosclerosis. No aneurysm or dissection. VASCULATURE:  No visible pulmonary arterial thrombus or attenuation. ABDOMEN/PELVIS:  LIVER:  No enlargement, atrophy, abnormal density, or significant focal lesion.   Multiple small calcified granulomas other which demonstrates similar moderate decrease in size. A 3rd retroperitoneal lymph node is stable. 2. Stable small right paratracheal lymph node.   3. Increase in diameter of the appendix with development of intraluminal debris is noted without appen motion abnormalities.      Features are consistent with a pseudonormal left ventricular filling      pattern, with concomitant abnormal relaxation and increased filling      pressure - grade 2 diastolic dysfunction.    2. Left atrium: The left atrial volume range. There was no   evidence for stenosis. There was no significant regurgitation. Pericardium: Mojgan Chew was no pericardial effusion. Aorta:  Aortic root: The aortic root was normal.   Ascending aorta:  The ascending aorta was normal.   Pulmonary artery - 28    LA volume, ES, 1-p A4C                  49    ml     18 - 58    LA volume/bsa, ES, 1-p A4C              23    ml/m^2 ---------    LA volume, ES, 1-p A2C                  41    ml     18 - 58    LA volume/bsa, ES, 1-p A2C              20    ml/m^2 - to previous. I feel IO is still controlling some of his disease. I therefore discussed adding chemotherapy to IO. Rationale for this, how given and potential side effects were discussed at length.  He verbalized understanding but prefers to start this next

## 2021-09-08 NOTE — PROGRESS NOTES
Pt here for C1D1 of Keytruda/Alimpta/Carbo .   Arrives Ambulating independently, accompanied by Self and Spouse           Pregnancy screening: Not applicable    Modifications in dose or schedule: No     Frequency of blood return and site check throughout ad

## 2021-09-20 PROBLEM — E11.69 DYSLIPIDEMIA ASSOCIATED WITH TYPE 2 DIABETES MELLITUS (HCC): Status: ACTIVE | Noted: 2021-09-20

## 2021-09-20 PROBLEM — E78.5 DYSLIPIDEMIA ASSOCIATED WITH TYPE 2 DIABETES MELLITUS (HCC): Status: ACTIVE | Noted: 2021-09-20

## 2021-09-20 PROBLEM — E11.319 TYPE 2 DIABETES MELLITUS WITH RETINOPATHY, WITH LONG-TERM CURRENT USE OF INSULIN, MACULAR EDEMA PRESENCE UNSPECIFIED, UNSPECIFIED LATERALITY, UNSPECIFIED RETINOPATHY SEVERITY (HCC): Status: ACTIVE | Noted: 2021-09-20

## 2021-09-20 PROBLEM — E78.5 DYSLIPIDEMIA ASSOCIATED WITH TYPE 2 DIABETES MELLITUS: Status: ACTIVE | Noted: 2021-09-20

## 2021-09-20 PROBLEM — Z79.4 TYPE 2 DIABETES MELLITUS WITH RETINOPATHY, WITH LONG-TERM CURRENT USE OF INSULIN, MACULAR EDEMA PRESENCE UNSPECIFIED, UNSPECIFIED LATERALITY, UNSPECIFIED RETINOPATHY SEVERITY (HCC): Status: ACTIVE | Noted: 2021-09-20

## 2021-09-20 PROBLEM — E11.69 DYSLIPIDEMIA ASSOCIATED WITH TYPE 2 DIABETES MELLITUS: Status: ACTIVE | Noted: 2021-09-20

## 2021-09-21 DIAGNOSIS — C34.81 MALIGNANT NEOPLASM OF OVERLAPPING SITES OF RIGHT LUNG (HCC): ICD-10-CM

## 2021-09-21 RX ORDER — FOLIC ACID 1 MG/1
1 TABLET ORAL DAILY
Qty: 90 TABLET | Refills: 3 | Status: SHIPPED | OUTPATIENT
Start: 2021-09-21 | End: 2021-12-22

## 2021-09-29 ENCOUNTER — OFFICE VISIT (OUTPATIENT)
Dept: HEMATOLOGY/ONCOLOGY | Facility: HOSPITAL | Age: 76
End: 2021-09-29
Attending: INTERNAL MEDICINE
Payer: MEDICARE

## 2021-09-29 VITALS
BODY MASS INDEX: 29.69 KG/M2 | HEART RATE: 67 BPM | SYSTOLIC BLOOD PRESSURE: 144 MMHG | TEMPERATURE: 98 F | OXYGEN SATURATION: 95 % | WEIGHT: 193.63 LBS | RESPIRATION RATE: 16 BRPM | HEIGHT: 67.6 IN | DIASTOLIC BLOOD PRESSURE: 65 MMHG

## 2021-09-29 DIAGNOSIS — Z71.9 ENCOUNTER FOR HEALTH EDUCATION: Primary | ICD-10-CM

## 2021-09-29 DIAGNOSIS — R91.8 LUNG MASS: ICD-10-CM

## 2021-09-29 DIAGNOSIS — R59.0 HILAR ADENOPATHY: ICD-10-CM

## 2021-09-29 DIAGNOSIS — D72.820 MONOCLONAL B-CELL LYMPHOCYTOSIS: ICD-10-CM

## 2021-09-29 DIAGNOSIS — C78.7 LIVER METASTASES (HCC): ICD-10-CM

## 2021-09-29 DIAGNOSIS — Z51.12 ENCOUNTER FOR ANTINEOPLASTIC IMMUNOTHERAPY: ICD-10-CM

## 2021-09-29 DIAGNOSIS — E87.1 HYPONATREMIA: ICD-10-CM

## 2021-09-29 DIAGNOSIS — R53.0 NEOPLASTIC MALIGNANT RELATED FATIGUE: ICD-10-CM

## 2021-09-29 DIAGNOSIS — M89.9 RIB LESION: ICD-10-CM

## 2021-09-29 DIAGNOSIS — R91.8 LUNG MASS: Primary | ICD-10-CM

## 2021-09-29 DIAGNOSIS — C34.81 MALIGNANT NEOPLASM OF OVERLAPPING SITES OF RIGHT LUNG (HCC): ICD-10-CM

## 2021-09-29 DIAGNOSIS — T88.7XXA MEDICATION SIDE EFFECTS: ICD-10-CM

## 2021-09-29 DIAGNOSIS — R53.83 FATIGUE DUE TO TREATMENT: ICD-10-CM

## 2021-09-29 DIAGNOSIS — R19.8 GI SYMPTOMS: ICD-10-CM

## 2021-09-29 DIAGNOSIS — R59.9 ADENOPATHY: ICD-10-CM

## 2021-09-29 DIAGNOSIS — C34.81 MALIGNANT NEOPLASM OF OVERLAPPING SITES OF RIGHT LUNG (HCC): Primary | ICD-10-CM

## 2021-09-29 LAB
ALBUMIN SERPL-MCNC: 3.1 G/DL (ref 3.4–5)
ALBUMIN/GLOB SERPL: 0.8 {RATIO} (ref 1–2)
ALP LIVER SERPL-CCNC: 101 U/L
ALT SERPL-CCNC: 16 U/L
ANION GAP SERPL CALC-SCNC: 4 MMOL/L (ref 0–18)
AST SERPL-CCNC: 10 U/L (ref 15–37)
BASOPHILS # BLD AUTO: 0.03 X10(3) UL (ref 0–0.2)
BASOPHILS NFR BLD AUTO: 0.3 %
BILIRUB SERPL-MCNC: 0.4 MG/DL (ref 0.1–2)
BUN BLD-MCNC: 24 MG/DL (ref 7–18)
CALCIUM BLD-MCNC: 9 MG/DL (ref 8.5–10.1)
CHLORIDE SERPL-SCNC: 106 MMOL/L (ref 98–112)
CO2 SERPL-SCNC: 25 MMOL/L (ref 21–32)
CREAT BLD-MCNC: 0.97 MG/DL
EOSINOPHIL # BLD AUTO: 0.17 X10(3) UL (ref 0–0.7)
EOSINOPHIL NFR BLD AUTO: 1.8 %
ERYTHROCYTE [DISTWIDTH] IN BLOOD BY AUTOMATED COUNT: 14.5 %
GLOBULIN PLAS-MCNC: 4 G/DL (ref 2.8–4.4)
GLUCOSE BLD-MCNC: 220 MG/DL (ref 70–99)
HCT VFR BLD AUTO: 40.4 %
HGB BLD-MCNC: 12.6 G/DL
IMM GRANULOCYTES # BLD AUTO: 0.01 X10(3) UL (ref 0–1)
IMM GRANULOCYTES NFR BLD: 0.1 %
LYMPHOCYTES # BLD AUTO: 4.22 X10(3) UL (ref 1–4)
LYMPHOCYTES NFR BLD AUTO: 45.3 %
MCH RBC QN AUTO: 27.2 PG (ref 26–34)
MCHC RBC AUTO-ENTMCNC: 31.2 G/DL (ref 31–37)
MCV RBC AUTO: 87.1 FL
MONOCYTES # BLD AUTO: 0.36 X10(3) UL (ref 0.1–1)
MONOCYTES NFR BLD AUTO: 3.9 %
NEUTROPHILS # BLD AUTO: 4.52 X10 (3) UL (ref 1.5–7.7)
NEUTROPHILS # BLD AUTO: 4.52 X10(3) UL (ref 1.5–7.7)
NEUTROPHILS NFR BLD AUTO: 48.6 %
OSMOLALITY SERPL CALC.SUM OF ELEC: 291 MOSM/KG (ref 275–295)
PLATELET # BLD AUTO: 261 10(3)UL (ref 150–450)
POTASSIUM SERPL-SCNC: 4.5 MMOL/L (ref 3.5–5.1)
PROT SERPL-MCNC: 7.1 G/DL (ref 6.4–8.2)
RBC # BLD AUTO: 4.64 X10(6)UL
SODIUM SERPL-SCNC: 135 MMOL/L (ref 136–145)
T4 FREE SERPL-MCNC: 1.3 NG/DL (ref 0.8–1.7)
WBC # BLD AUTO: 9.3 X10(3) UL (ref 4–11)

## 2021-09-29 PROCEDURE — 96375 TX/PRO/DX INJ NEW DRUG ADDON: CPT

## 2021-09-29 PROCEDURE — 80053 COMPREHEN METABOLIC PANEL: CPT | Performed by: INTERNAL MEDICINE

## 2021-09-29 PROCEDURE — 84439 ASSAY OF FREE THYROXINE: CPT | Performed by: INTERNAL MEDICINE

## 2021-09-29 PROCEDURE — 99215 OFFICE O/P EST HI 40 MIN: CPT | Performed by: INTERNAL MEDICINE

## 2021-09-29 PROCEDURE — 96411 CHEMO IV PUSH ADDL DRUG: CPT

## 2021-09-29 PROCEDURE — 96367 TX/PROPH/DG ADDL SEQ IV INF: CPT

## 2021-09-29 PROCEDURE — 96417 CHEMO IV INFUS EACH ADDL SEQ: CPT

## 2021-09-29 PROCEDURE — 96413 CHEMO IV INFUSION 1 HR: CPT

## 2021-09-29 PROCEDURE — 85025 COMPLETE CBC W/AUTO DIFF WBC: CPT | Performed by: INTERNAL MEDICINE

## 2021-09-29 NOTE — PROGRESS NOTES
Pt here for C 1 D 1 .   Arrives Ambulating independently, accompanied by Self           Pregnancy screening: Not applicable    Modifications in dose or schedule: No     Frequency of blood return and site check throughout administration: Prior to administrat

## 2021-09-29 NOTE — PROGRESS NOTES
Chemotherapy Education    Learner:  Patient    Barriers / Limitations:  None    Chemotherapy education goals:  · Learn the drug names  · Administration schedule  · Routes of administration  · Treatment setting    Drug names:  Carboplatin, Pemetrexed, Pembr Achieved    Possible menstrual irregularity and vaginal dryness:  N/A    Notify MD/RN of any changes or problems:  Achieved    Comments:    Treatment Effects on Emotional Status:    Potential mood changes, depression, nervousness, difficulty sleeping:  Ach

## 2021-09-29 NOTE — PROGRESS NOTES
Cancer Center Progress Note    Patient Name: Monae Felton   YOB: 1945   Medical Record Number: [id number]   CSN: [id number]   Attending Physician: Luciana Wilson M.D.    Referring Physician: DR Go BERRY      Date of Visit: 9/29/2021 paratracheal and retroperitoneal adenopathy as well as 4th right rib lesion. 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     2.  Rolandoo Inject 30 Units into the skin every evening.  90 days and 1 refill, Disp: 18 mL, Rfl: 1  •  METFORMIN  MG Oral Tab, TAKE 1 TABLET 3 TIMES A DAY, Disp: 270 tablet, Rfl: 3  •  YUPELRI 175 MCG/3ML Inhalation Solution nebulizer solution, , Disp: , Rfl: • High blood pressure    • High cholesterol    • Hoarseness, chronic on occasion   • Osteoarthrosis, unspecified whether generalized or localized, unspecified site    • Other and unspecified hyperlipidemia    • Pain in joints several years   • Pancreatit No      Sexual activity: Not on file    Other Topics      Concerns:        Not on file    Social History Narrative      Not on file    Social Determinants of Health  Financial Resource Strain:       Difficulty of Paying Living Expenses: Not on file  Food I auscultation. Heart: Regular rate and rhythm. Abdomen: Less distention, soft, non tender with good bowel sounds. No hepatosplenomegaly. No palpable mass. Extremities: Pedal pulses are present. No BLE edema. Neurological: Grossly intact.        Anais Finley  [facility] , CT, CT CHEST+ABDOMEN+PELVIS(ALL CNTRST ONLY)(CPT=71260/80299), 4/22/2021, 9:29 AM.  BEAR CRUZ, PET/CT STANDARD BODY SCAN (ONCOLOGY) (CPT=78815), 10/23/2020, 9:49 AM.       INDICATIONS:  C34.81 Malignant neoplasm of overlapping sites of right wilder further details.  Anterior to the left femoral neck is abnormal uptake with a maximum SUV of 3.1 may represent an inflammatory/infectious process.                        Impression   CONCLUSION:     1.  When compared to most recent PET performed 10/23/2020, or dissection.     VASCULATURE:  No visible pulmonary arterial thrombus or attenuation.         ABDOMEN/PELVIS:   LIVER:  Multiple calcified granulomas throughout the liver.    BILIARY:  No visible dilatation or calcification.     PANCREAS:  No lesion, flui 1. Increasing size of retroperitoneal lymphadenopathy concerning for worsening metastatic disease.  Possibility of other etiologies such as lymphoma cannot be completely excluded.       2. Stable soft tissue thickening along the right upper lobe bronchus atherosclerosis. No aneurysm or dissection. VASCULATURE:  No visible pulmonary arterial thrombus or attenuation. ABDOMEN/PELVIS:  LIVER:  No enlargement, atrophy, abnormal density, or significant focal lesion.   Multiple small calcified granulomas other which demonstrates similar moderate decrease in size. A 3rd retroperitoneal lymph node is stable. 2. Stable small right paratracheal lymph node.   3. Increase in diameter of the appendix with development of intraluminal debris is noted without appen motion abnormalities.      Features are consistent with a pseudonormal left ventricular filling      pattern, with concomitant abnormal relaxation and increased filling      pressure - grade 2 diastolic dysfunction.    2. Left atrium: The left atrial volume range. There was no   evidence for stenosis. There was no significant regurgitation. Pericardium: Mara Gold was no pericardial effusion. Aorta:  Aortic root: The aortic root was normal.   Ascending aorta:  The ascending aorta was normal.   Pulmonary artery - 28    LA volume, ES, 1-p A4C                  49    ml     18 - 58    LA volume/bsa, ES, 1-p A4C              23    ml/m^2 ---------    LA volume, ES, 1-p A2C                  41    ml     18 - 58    LA volume/bsa, ES, 1-p A2C              20    ml/m^2 - disease. I therefore discussed adding chemotherapy to IO. Was to start last visit but he wanted to hold off until after his golfing trip. He will start this today.   Asthenia and fatigue felt to be from IO and work-up for other potential causes such as hypo

## 2021-09-30 ENCOUNTER — TELEPHONE (OUTPATIENT)
Dept: HEMATOLOGY/ONCOLOGY | Facility: HOSPITAL | Age: 76
End: 2021-09-30

## 2021-09-30 NOTE — TELEPHONE ENCOUNTER
Toxicities: C1 D1 Pembrolizumab/Pemetrexed/Carboplatin on 9/29/2021    Rachel Rivera said he feels a little tired today, but no other complaints. He said he slept well. I encouraged Rachel Rivera to stay hydrated to help fight the fatigue.  I asked him to drink 64-80 oz of

## 2021-10-06 ENCOUNTER — APPOINTMENT (OUTPATIENT)
Dept: HEMATOLOGY/ONCOLOGY | Facility: HOSPITAL | Age: 76
End: 2021-10-06
Attending: INTERNAL MEDICINE
Payer: MEDICARE

## 2021-10-07 ENCOUNTER — OFFICE VISIT (OUTPATIENT)
Dept: HEMATOLOGY/ONCOLOGY | Facility: HOSPITAL | Age: 76
End: 2021-10-07
Attending: INTERNAL MEDICINE
Payer: MEDICARE

## 2021-10-07 VITALS
TEMPERATURE: 97 F | DIASTOLIC BLOOD PRESSURE: 76 MMHG | RESPIRATION RATE: 16 BRPM | HEART RATE: 65 BPM | BODY MASS INDEX: 29.54 KG/M2 | HEIGHT: 67.6 IN | OXYGEN SATURATION: 96 % | WEIGHT: 192.63 LBS | SYSTOLIC BLOOD PRESSURE: 169 MMHG

## 2021-10-07 DIAGNOSIS — C34.81 MALIGNANT NEOPLASM OF OVERLAPPING SITES OF RIGHT LUNG (HCC): Primary | ICD-10-CM

## 2021-10-07 PROCEDURE — 99215 OFFICE O/P EST HI 40 MIN: CPT | Performed by: NURSE PRACTITIONER

## 2021-10-07 NOTE — PROGRESS NOTES
Patient presents with: Follow - Up: APN assessment - Day 8    Pt is here for follow up - day 8 carbo/alimta/keytruda.  Eating and drinking without issue; denies N,V. Had 2 days of constipation after treatment then had 5 loose stools in one overnight and th

## 2021-10-07 NOTE — PROGRESS NOTES
Cancer Center Progress Note    Patient Name: Selam Gonzalez   YOB: 1945   Medical Record Number: [id number]   CSN: [id number]   Date of visit: 10/7/2021     Chief Complaint/Reason for Visit:  Patient presents with:   Follow - Up: APN assessment with retinopathy, with long-term current use of insulin, macular edema presence unspecified, unspecified laterality, unspecified retinopathy severity ([facility] [address]     Dyslipidemia associated with type 2 diabetes mellitus [facility])       Medical History:  Past Medical Allergies    Family History:  Family History   Problem Relation Age of Onset   • Uterine Cancer Mother        Social History:  Social History    Socioeconomic History      Marital status:       Spouse name: Not on file      Number of children: Not o Stability:       Unable to Pay for Housing in the Last Year: Not on file      Number of Places Lived in the Last Year: Not on file      Unstable Housing in the Last Year: Not on file    Medications:    Current Outpatient Medications:   •  folic acid 1 MG O mouth daily as needed. , Disp: , Rfl:   •  atorvastatin 10 MG Oral Tab, Take 10 mg by mouth nightly., Disp: , Rfl:   •  Isosorbide Mononitrate ER 30 MG Oral Tablet 24 Hr, Take 30 mg by mouth daily. , Disp: , Rfl:   •  aspirin 81 MG Oral Tab, Take 81 mg by mo no erythema or rash   Psych/Depression: mood and affect are appropriate. Labs:   No results found for this or any previous visit (from the past 72 hour(s)).     Impression/Plan    NSCLC, SCC: chemotherapy (carboplatin and pemetrexed) added to immunother

## 2021-10-14 ENCOUNTER — IMMUNIZATION (OUTPATIENT)
Dept: LAB | Facility: HOSPITAL | Age: 76
End: 2021-10-14
Attending: EMERGENCY MEDICINE
Payer: MEDICARE

## 2021-10-14 DIAGNOSIS — Z23 NEED FOR VACCINATION: Primary | ICD-10-CM

## 2021-10-14 PROCEDURE — 0003A SARSCOV2 VAC 30MCG/0.3ML IM: CPT

## 2021-10-20 ENCOUNTER — OFFICE VISIT (OUTPATIENT)
Dept: HEMATOLOGY/ONCOLOGY | Facility: HOSPITAL | Age: 76
End: 2021-10-20
Attending: INTERNAL MEDICINE
Payer: MEDICARE

## 2021-10-20 VITALS
HEART RATE: 66 BPM | RESPIRATION RATE: 16 BRPM | BODY MASS INDEX: 30.08 KG/M2 | HEIGHT: 67.6 IN | TEMPERATURE: 98 F | WEIGHT: 196.19 LBS | DIASTOLIC BLOOD PRESSURE: 70 MMHG | OXYGEN SATURATION: 97 % | SYSTOLIC BLOOD PRESSURE: 173 MMHG

## 2021-10-20 DIAGNOSIS — C34.81 MALIGNANT NEOPLASM OF OVERLAPPING SITES OF RIGHT LUNG (HCC): Primary | ICD-10-CM

## 2021-10-20 DIAGNOSIS — T88.7XXA MEDICATION SIDE EFFECTS: ICD-10-CM

## 2021-10-20 DIAGNOSIS — D72.820 MONOCLONAL B-CELL LYMPHOCYTOSIS: ICD-10-CM

## 2021-10-20 DIAGNOSIS — Z51.12 ENCOUNTER FOR ANTINEOPLASTIC IMMUNOTHERAPY: ICD-10-CM

## 2021-10-20 DIAGNOSIS — C78.7 LIVER METASTASES (HCC): ICD-10-CM

## 2021-10-20 DIAGNOSIS — R53.0 NEOPLASTIC MALIGNANT RELATED FATIGUE: ICD-10-CM

## 2021-10-20 DIAGNOSIS — R19.8 GI SYMPTOMS: ICD-10-CM

## 2021-10-20 DIAGNOSIS — R59.0 HILAR ADENOPATHY: ICD-10-CM

## 2021-10-20 DIAGNOSIS — E87.1 HYPONATREMIA: ICD-10-CM

## 2021-10-20 DIAGNOSIS — R59.9 ADENOPATHY: ICD-10-CM

## 2021-10-20 PROCEDURE — 96375 TX/PRO/DX INJ NEW DRUG ADDON: CPT

## 2021-10-20 PROCEDURE — 96413 CHEMO IV INFUSION 1 HR: CPT

## 2021-10-20 PROCEDURE — 99215 OFFICE O/P EST HI 40 MIN: CPT | Performed by: INTERNAL MEDICINE

## 2021-10-20 PROCEDURE — 96367 TX/PROPH/DG ADDL SEQ IV INF: CPT

## 2021-10-20 PROCEDURE — 96411 CHEMO IV PUSH ADDL DRUG: CPT

## 2021-10-20 PROCEDURE — 96417 CHEMO IV INFUS EACH ADDL SEQ: CPT

## 2021-10-21 NOTE — PROGRESS NOTES
Cancer Center Progress Note    Patient Name: Kristina Lazcano   YOB: 1945   Medical Record Number: [id number]   CSN: [id number]   Attending Physician: Jl Hayes M.D.    Referring Physician: DR Altagracia BERRY      Date of Visit: 10/20/2021 left supraclavicular, low paratracheal and retroperitoneal adenopathy as well as 4th right rib lesion.  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 c/w his original Take 1 tablet (10 mg total) by mouth every 6 (six) hours as needed for Nausea., Disp: 30 tablet, Rfl: 3  •  ondansetron (ZOFRAN) 8 MG tablet, Take 1 tablet (8 mg total) by mouth every 8 (eight) hours as needed for Nausea., Disp: 30 tablet, Rfl: 3  •  glipi pain/belching many years   • Frequent urination diabetic   • Hearing impairment    • High blood pressure    • High cholesterol    • Hoarseness, chronic on occasion   • Osteoarthrosis, unspecified whether generalized or localized, unspecified site    • Othe Substance and Sexual Activity      Alcohol use: Not Currently      Drug use: No      Sexual activity: Not on file    Other Topics      Concerns:        Not on file    Social History Narrative      Not on file    Social Determinants of Health  Financial Res supple. Lymphatics: There is no palpable lymphadenopathy   Chest: Clear to auscultation. Heart: Regular rate and rhythm. Abdomen: Less distention, soft, non tender with good bowel sounds. No hepatosplenomegaly. No palpable mass.   Extremities: Pedal p node adjacent to the left thyroid gland which now measures 2.3 x 1.6 versus 0.9 x 0.8 cm when compared to most recent PET performed 10/23/2020 with a maximum SUV of 9.7.  Previously   described right low paratracheal node measures 5.4 vs 5.7 SUV and measur ONLY)(MDZ=00467/91185), 1/18/2021, 9:20 AM.       INDICATIONS:  C34.81 Malignant neoplasm of overlapping sites of right lung ([address]       TECHNIQUE:  IV contrast-enhanced scanning through the chest, abdomen, and pelvis was performed. [place] [employer] reduction techniq aorta iliac vessels. RETROPERITONEUM:  A portal caval lymph node is increased in size measuring 26 x 20 mm were previously measured (22 x 17 mm) on image number 183.  A lymph node just anterior to the bifurcation of the celiac axis measures 28 x 18 mm whi (ONCOLOGY) (CPT=78815), 10/23/2020, 9:49 AM.     INDICATIONS:  C34.81 Malignant neoplasm of overlapping sites of right lung ([facility] [address]     TECHNIQUE:  IV contrast-enhanced scanning through the chest, abdomen, and pelvis was performed.   Dose reduction techniques and proper hepatic artery measures 1.9 x 1.8 cm on image 28 of series 2, previously 2.7 x 2.5 cm. Lymph node at the branch point of the celiac artery between the proper hepatic and splenic   arteries measures 2.1 x 2.1 cm on image 206, previously 1.5 x 1. HR:  55bpm   Loc:  [facility]        Gndr: M          BSA: 2.09m^2     Sonographer: Svetlana SHIPLEY   Ordering:   Libra Le       Referring: Keke Hussein I     ----------------------------------------------------------------------------   History/Indica consistent with a pseudonormal left ventricular filling   pattern, with concomitant abnormal relaxation and increased filling pressure   - grade 2 diastolic dysfunction. Left atrium:  The left atrium was normal in size.  The left atrial volume was   ebenezer   cm     4.2 - 5.9    LV ID, ES, PLAX                         2.9   cm     ---------    LV fx shortening, PLAX                  38    %      25 - 43    LV mid-wall fx shortening, PLAX         17    %      14 - 22    LV PW thickness, ED, PLAX               valve                         Value        Reference    Tricuspid regurg peak velocity          2.47  m/sec  ---------    Tricuspid peak RV-RA gradient           24    mm Hg  ---------        Systemic veins                          Value        Reference CBC showed a normal WBC with mild lymphocytosis. Stable    6. 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 Mild and stable    7. Abdominal discomfort, cramping-  felt to be from IO which waxes and wanes. Not bad lately.     8. Left chest twinges- unlikely ca

## 2021-11-02 ENCOUNTER — OFFICE VISIT (OUTPATIENT)
Dept: FAMILY MEDICINE CLINIC | Facility: CLINIC | Age: 76
End: 2021-11-02
Payer: MEDICARE

## 2021-11-02 DIAGNOSIS — Z23 NEED FOR INFLUENZA VACCINATION: Primary | ICD-10-CM

## 2021-11-02 PROCEDURE — G0008 ADMIN INFLUENZA VIRUS VAC: HCPCS | Performed by: NURSE PRACTITIONER

## 2021-11-02 PROCEDURE — 90662 IIV NO PRSV INCREASED AG IM: CPT | Performed by: NURSE PRACTITIONER

## 2021-11-08 ENCOUNTER — HOSPITAL ENCOUNTER (OUTPATIENT)
Dept: PERIOP | Facility: HOSPITAL | Age: 76
Discharge: HOME | End: 2021-11-08
Attending: INTERNAL MEDICINE
Payer: MEDICARE

## 2021-11-08 ENCOUNTER — TELEPHONE (OUTPATIENT)
Dept: HEMATOLOGY/ONCOLOGY | Facility: HOSPITAL | Age: 76
End: 2021-11-08

## 2021-11-08 ENCOUNTER — HOSPITAL ENCOUNTER (OUTPATIENT)
Dept: CT IMAGING | Facility: HOSPITAL | Age: 76
Discharge: HOME | End: 2021-11-08
Attending: INTERNAL MEDICINE
Payer: MEDICARE

## 2021-11-08 DIAGNOSIS — C34.81 MALIGNANT NEOPLASM OF OVERLAPPING SITES OF RIGHT LUNG (HCC): ICD-10-CM

## 2021-11-08 PROCEDURE — 71260 CT THORAX DX C+: CPT | Performed by: INTERNAL MEDICINE

## 2021-11-08 PROCEDURE — 76937 US GUIDE VASCULAR ACCESS: CPT

## 2021-11-08 PROCEDURE — 36410 VNPNXR 3YR/> PHY/QHP DX/THER: CPT

## 2021-11-08 PROCEDURE — 74177 CT ABD & PELVIS W/CONTRAST: CPT | Performed by: INTERNAL MEDICINE

## 2021-11-08 NOTE — TELEPHONE ENCOUNTER
Mynor Cooper with CT results- cancer improved but has new small PE. Spoke to his wife. Will start on Eliquis today.

## 2021-11-10 ENCOUNTER — OFFICE VISIT (OUTPATIENT)
Dept: HEMATOLOGY/ONCOLOGY | Facility: HOSPITAL | Age: 76
End: 2021-11-10
Attending: INTERNAL MEDICINE
Payer: MEDICARE

## 2021-11-10 ENCOUNTER — TELEPHONE (OUTPATIENT)
Dept: HEMATOLOGY/ONCOLOGY | Facility: HOSPITAL | Age: 76
End: 2021-11-10

## 2021-11-10 VITALS
DIASTOLIC BLOOD PRESSURE: 79 MMHG | BODY MASS INDEX: 30.33 KG/M2 | HEART RATE: 64 BPM | OXYGEN SATURATION: 99 % | TEMPERATURE: 98 F | WEIGHT: 197.81 LBS | SYSTOLIC BLOOD PRESSURE: 143 MMHG | RESPIRATION RATE: 16 BRPM | HEIGHT: 67.6 IN

## 2021-11-10 DIAGNOSIS — R19.8 GI SYMPTOMS: ICD-10-CM

## 2021-11-10 DIAGNOSIS — I26.99 PE (PULMONARY THROMBOEMBOLISM) (HCC): ICD-10-CM

## 2021-11-10 DIAGNOSIS — D72.820 MONOCLONAL B-CELL LYMPHOCYTOSIS: ICD-10-CM

## 2021-11-10 DIAGNOSIS — Z51.12 ENCOUNTER FOR ANTINEOPLASTIC IMMUNOTHERAPY: ICD-10-CM

## 2021-11-10 DIAGNOSIS — E87.1 HYPONATREMIA: ICD-10-CM

## 2021-11-10 DIAGNOSIS — R59.0 HILAR ADENOPATHY: ICD-10-CM

## 2021-11-10 DIAGNOSIS — C34.81 MALIGNANT NEOPLASM OF OVERLAPPING SITES OF RIGHT LUNG (HCC): Primary | ICD-10-CM

## 2021-11-10 DIAGNOSIS — T88.7XXA MEDICATION SIDE EFFECTS: ICD-10-CM

## 2021-11-10 DIAGNOSIS — C78.7 LIVER METASTASES (HCC): ICD-10-CM

## 2021-11-10 DIAGNOSIS — R59.9 ADENOPATHY: Primary | ICD-10-CM

## 2021-11-10 DIAGNOSIS — C34.81 MALIGNANT NEOPLASM OF OVERLAPPING SITES OF RIGHT LUNG (HCC): ICD-10-CM

## 2021-11-10 DIAGNOSIS — R53.0 NEOPLASTIC MALIGNANT RELATED FATIGUE: ICD-10-CM

## 2021-11-10 PROCEDURE — 96367 TX/PROPH/DG ADDL SEQ IV INF: CPT

## 2021-11-10 PROCEDURE — 96411 CHEMO IV PUSH ADDL DRUG: CPT

## 2021-11-10 PROCEDURE — 96413 CHEMO IV INFUSION 1 HR: CPT

## 2021-11-10 PROCEDURE — 96417 CHEMO IV INFUS EACH ADDL SEQ: CPT

## 2021-11-10 PROCEDURE — 96375 TX/PRO/DX INJ NEW DRUG ADDON: CPT

## 2021-11-10 PROCEDURE — 96372 THER/PROPH/DIAG INJ SC/IM: CPT

## 2021-11-10 PROCEDURE — 99215 OFFICE O/P EST HI 40 MIN: CPT | Performed by: INTERNAL MEDICINE

## 2021-11-10 RX ORDER — CYANOCOBALAMIN 1000 UG/ML
1000 INJECTION INTRAMUSCULAR; SUBCUTANEOUS ONCE
Status: COMPLETED | OUTPATIENT
Start: 2021-11-10 | End: 2021-11-10

## 2021-11-10 RX ORDER — CYANOCOBALAMIN 1000 UG/ML
1000 INJECTION INTRAMUSCULAR; SUBCUTANEOUS ONCE
OUTPATIENT
Start: 2021-12-01

## 2021-11-10 RX ADMIN — CYANOCOBALAMIN 1000 MCG: 1000 INJECTION INTRAMUSCULAR; SUBCUTANEOUS at 12:18:00

## 2021-11-10 NOTE — PROGRESS NOTES
Cancer Center Progress Note    Patient Name: Anola Barthel   YOB: 1945   Medical Record Number: [id number]   CSN: [id number]   Attending Physician: Vida Santoro M.D.    Referring Physician: DR Vanessa BERRY      Date of Visit: 11/10/2021 and BRAF negative. PDL-1 71-80% (high). - started pembrolizumab 12/27/19     - PET scan done 8/25/21 which showed abnormal uptake involving a left supraclavicular, low paratracheal and retroperitoneal adenopathy as well as 4th right rib lesion.  Biopsy (COMPAZINE) 10 mg tablet, Take 1 tablet (10 mg total) by mouth every 6 (six) hours as needed for Nausea., Disp: 30 tablet, Rfl: 3  •  ondansetron (ZOFRAN) 8 MG tablet, Take 1 tablet (8 mg total) by mouth every 8 (eight) hours as needed for Nausea., Disp: 3 on & off   • Flatulence/gas pain/belching many years   • Frequent urination diabetic   • Hearing impairment    • High blood pressure    • High cholesterol    • Hoarseness, chronic on occasion   • Osteoarthrosis, unspecified whether generalized or localized Vaping Use: Never used    Substance and Sexual Activity      Alcohol use: Not Currently      Drug use: No      Sexual activity: Not on file    Other Topics      Concerns:        Not on file    Social History Narrative      Not on file    Social Determina 291 275 - 295 mOsm/kg    GFR, Non- 68 >=60    GFR, -American 79 >=60    AST 13 (L) 15 - 37 U/L    ALT 21 16 - 61 U/L    Alkaline Phosphatase 81 45 - 117 U/L    Bilirubin, Total 0.3 0.1 - 2.0 mg/dL    Total Protein 6.6 6.4 - 8.2 g/dL overlapping sites of right lung ([address]       TECHNIQUE:  IV contrast-enhanced scanning through the chest, abdomen, and pelvis was performed.  Dose reduction techniques were used.  Dose information is transmitted to the  [facility] of Radiology) NRD RETROPERITONEUM:  Periaortic lymph nodes such as a left periaortic lymph node are enlarged measuring 32 x 28 mm which is stable since previous study.  In the region of the left renal vein there is a enlarged lymph node posterior to the renal vein   measu PROCEDURE:  PET/CT STANDARD BODY SCAN (ONCOLOGY) (YSO=44574)       COMPARISON:  [facility] , CT, CT CHEST+ABDOMEN+PELVIS(ALL CNTRST ONLY)(CPT=71260/40891), 4/22/2021, 9:29 AM. BEAR Yusuf, PET/CT STANDARD BODY SCAN (ONCOLOGY) (ZCT=43881), 10/23/2020, dedicated CT of the chest, abdomen, and pelvis recently performed on 4/22/2021 for further details.  Anterior to the left femoral neck is abnormal uptake with a maximum SUV of 3.1 may represent an inflammatory/infectious process.                        I or effusion.     CHEST WALL:  No mass or axillary adenopathy.     AORTA:  No aneurysm or dissection.     VASCULATURE:  No visible pulmonary arterial thrombus or attenuation.         ABDOMEN/PELVIS:   LIVER:  Multiple calcified granulomas throughout the fiorella the thoracic and lumbar spine.                        Impression   CONCLUSION:         1.  Increasing size of retroperitoneal lymphadenopathy concerning for worsening metastatic disease.  Possibility of other etiologies such as lymphoma cannot be completely effusion. CHEST WALL:  No mass or axillary adenopathy. AORTA:  Mild atherosclerosis. No aneurysm or dissection. VASCULATURE:  No visible pulmonary arterial thrombus or attenuation.        ABDOMEN/PELVIS:  LIVER:  No enlargement, atrophy, abnormal abdominal lymph nodes, 1 which demonstrates moderate increase in size and the other which demonstrates similar moderate decrease in size. A 3rd retroperitoneal lymph node is stable. 2. Stable small right paratracheal lymph node.   3. Increase in diameter fraction was 60-65%.      There was no diagnostic evidence for regional wall motion abnormalities.      Features are consistent with a pseudonormal left ventricular filling      pattern, with concomitant abnormal relaxation and increased filling      press separation was normal.   Doppler:  Transvalvular velocity was within the normal range. There was no   evidence for stenosis. There was no significant regurgitation. Pericardium: Nato Mins was no pericardial effusion.    Aorta:  Aortic root: The aortic root w  45    ml     18 - 58    LA volume/bsa, S                        22    ml/m^2 16 - 28    LA volume, ES, 1-p A4C                  49    ml     18 - 63    LA volume/bsa, ES, 1-p A4C              23    ml/m^2 ---------    LA volume, ES, 1-p A2C               significantly less compared to previous. I feel IO is still controlling some of his disease. I therefore discussed adding chemotherapy to IO. Has had 2 cycles of carbo/pem/pembro and scans just done showed improvement.  Responding despite having SCC- could

## 2021-11-10 NOTE — TELEPHONE ENCOUNTER
Patient calling and saw Dr Rosina Ambrose this morning. Stated she prescribed Medication RYLAND RAPP is asking if there are any side effects- or anything he should be watching for.     Plz Advise

## 2021-11-10 NOTE — PROGRESS NOTES
Pt here for C3D1.   Arrives Ambulating independently, accompanied by Self           Pregnancy screening: Not applicable    Modifications in dose or schedule: No.    Instructed per dr Collazo Mail to have patient stop aspririn and continue with plavix and eliquis

## 2021-11-11 ENCOUNTER — TELEPHONE (OUTPATIENT)
Dept: HEMATOLOGY/ONCOLOGY | Facility: HOSPITAL | Age: 76
End: 2021-11-11

## 2021-11-11 NOTE — TELEPHONE ENCOUNTER
Patient called and said that Dr. Itzel Champion had told his wife that he would be taking Eliquis 10 mg daily. However, when he got the prescription yesterday it says the following:     \"Take one tablet by mouth 2x daily  Take 2 tablets twice daily for 5 days and

## 2021-11-24 ENCOUNTER — APPOINTMENT (OUTPATIENT)
Dept: MRI IMAGING | Facility: HOSPITAL | Age: 76
End: 2021-11-24
Attending: EMERGENCY MEDICINE
Payer: MEDICARE

## 2021-11-24 ENCOUNTER — TELEPHONE (OUTPATIENT)
Dept: HEMATOLOGY/ONCOLOGY | Facility: HOSPITAL | Age: 76
End: 2021-11-24

## 2021-11-24 ENCOUNTER — HOSPITAL ENCOUNTER (EMERGENCY)
Facility: HOSPITAL | Age: 76
Discharge: HOME | End: 2021-11-24
Attending: EMERGENCY MEDICINE
Payer: MEDICARE

## 2021-11-24 VITALS
OXYGEN SATURATION: 95 % | SYSTOLIC BLOOD PRESSURE: 119 MMHG | HEIGHT: 69 IN | WEIGHT: 200 LBS | TEMPERATURE: 98 F | HEART RATE: 62 BPM | BODY MASS INDEX: 29.62 KG/M2 | RESPIRATION RATE: 16 BRPM | DIASTOLIC BLOOD PRESSURE: 78 MMHG

## 2021-11-24 DIAGNOSIS — R51.9 HEADACHE: ICD-10-CM

## 2021-11-24 DIAGNOSIS — C34.81 MALIGNANT NEOPLASM OF OVERLAPPING SITES OF RIGHT LUNG (HCC): Primary | ICD-10-CM

## 2021-11-24 DIAGNOSIS — R51.9 NONINTRACTABLE HEADACHE, UNSPECIFIED CHRONICITY PATTERN, UNSPECIFIED HEADACHE TYPE: Primary | ICD-10-CM

## 2021-11-24 PROCEDURE — 36415 COLL VENOUS BLD VENIPUNCTURE: CPT

## 2021-11-24 PROCEDURE — A9575 INJ GADOTERATE MEGLUMI 0.1ML: HCPCS | Performed by: EMERGENCY MEDICINE

## 2021-11-24 PROCEDURE — 99284 EMERGENCY DEPT VISIT MOD MDM: CPT

## 2021-11-24 PROCEDURE — 85025 COMPLETE CBC W/AUTO DIFF WBC: CPT | Performed by: EMERGENCY MEDICINE

## 2021-11-24 PROCEDURE — 80053 COMPREHEN METABOLIC PANEL: CPT | Performed by: EMERGENCY MEDICINE

## 2021-11-24 PROCEDURE — 99285 EMERGENCY DEPT VISIT HI MDM: CPT

## 2021-11-24 PROCEDURE — 70553 MRI BRAIN STEM W/O & W/DYE: CPT | Performed by: EMERGENCY MEDICINE

## 2021-11-24 NOTE — TELEPHONE ENCOUNTER
Will need a STAT MRI. It's really backed up in radiology and will not likely be done outpatient until next week. Try pain meds for now.  If does not help ED or steroid trial

## 2021-11-24 NOTE — ED INITIAL ASSESSMENT (HPI)
PT TO ED WITH C/O PAIN TO TOP OF HEAD AND LEFT EAR X 2 DAYS, FAST SHARP SHOOTING PAIN.  HX OF LUNG CA AND KNOWN PE  IN RIGHT LUNG, ON THINNERS   PER FAMILY DR Pita Andrea REQUESTING STAT MRI

## 2021-11-24 NOTE — TELEPHONE ENCOUNTER
Lung Cancer - 11/10/21 - C3D1 - Carbo/Abraxane/Keytruda/Alimta    aJziel and Plavix    Radha Reyes called with complaints of H/A for couple of days to top of left part of head, with last night jabbing pain left ear, did not take anything yet wondering what he c

## 2021-11-24 NOTE — ED PROVIDER NOTES
Patient Seen in: [facility] Emergency Department      History   Patient presents with:  Pain    Stated Complaint: pain to top of head and in left ear.  sent in by oncologist. hx of lung CA and h*    Subjective:   HPI    14-year-old male who has a his Date   • ANGIOGRAM     • ANGIOPLASTY (CORONARY)  3/7/2015    RCA   • APPENDECTOMY     • CABG  1999    x 4   • CATH DRUG ELUTING STENT      x 5   • COLONOSCOPY  2015   • EGD  2010   • ERCP,DIAGNOSTIC  1988   • HERNIA SURGERY     • KNEE REPLACEMENT SURGERY tenderness  Neuro: No focal deficits noted    ED Course     Labs Reviewed   COMP METABOLIC PANEL (14) - Abnormal; Notable for the following components:       Result Value    Glucose 165 (*)     BUN 24 (*)     Calculated Osmolality 296 (*)     Total Protein are normal in caliber. There are no extra-axial fluid collections. There is no midline shift. SINUSES/ORBITS:       The visualized paranasal sinuses are clear. The orbits are unremarkable. MASTOIDS:       The mastoids are clear.             CONCLUSION: Severe coronary calcifications. PLEURA:  No mass or effusion. CHEST WALL:  No mass or axillary adenopathy. AORTA:  No aneurysm or dissection.   VASCULATURE:  Small partially occlusive branch embolus in the right middle lobe branches between the lateral an right 4th rib consistent with a stable metastatic lesion measuring 6 x 9 mm. Moderate multilevel degenerative changes of the spine. CONCLUSION:   1.  Small pulmonary embolus within the right middle lobe bifurcation of the medial lateral segments

## 2021-11-24 NOTE — TELEPHONE ENCOUNTER
Avery Belcher, per Dr Gar Mems to try some pain medication discussed starting with Tylenol ES now and repeat later if no relief.   If symptoms get worse or not relieved to present to ER and call office back for possible steroid trial.  He verbalizes [place]

## 2021-12-01 ENCOUNTER — OFFICE VISIT (OUTPATIENT)
Dept: HEMATOLOGY/ONCOLOGY | Facility: HOSPITAL | Age: 76
End: 2021-12-01
Attending: INTERNAL MEDICINE
Payer: MEDICARE

## 2021-12-01 VITALS
WEIGHT: 196.81 LBS | RESPIRATION RATE: 18 BRPM | SYSTOLIC BLOOD PRESSURE: 138 MMHG | TEMPERATURE: 97 F | OXYGEN SATURATION: 98 % | HEIGHT: 67.6 IN | HEART RATE: 74 BPM | DIASTOLIC BLOOD PRESSURE: 80 MMHG | BODY MASS INDEX: 30.18 KG/M2

## 2021-12-01 DIAGNOSIS — Z51.12 ENCOUNTER FOR ANTINEOPLASTIC IMMUNOTHERAPY: ICD-10-CM

## 2021-12-01 DIAGNOSIS — R59.9 ADENOPATHY: ICD-10-CM

## 2021-12-01 DIAGNOSIS — T88.7XXA MEDICATION SIDE EFFECTS: ICD-10-CM

## 2021-12-01 DIAGNOSIS — C78.7 LIVER METASTASES (HCC): ICD-10-CM

## 2021-12-01 DIAGNOSIS — D72.820 MONOCLONAL B-CELL LYMPHOCYTOSIS: ICD-10-CM

## 2021-12-01 DIAGNOSIS — I26.99 PE (PULMONARY THROMBOEMBOLISM) (HCC): ICD-10-CM

## 2021-12-01 DIAGNOSIS — H92.02 LEFT EAR PAIN: ICD-10-CM

## 2021-12-01 DIAGNOSIS — C34.81 MALIGNANT NEOPLASM OF OVERLAPPING SITES OF RIGHT LUNG (HCC): Primary | ICD-10-CM

## 2021-12-01 DIAGNOSIS — R59.0 HILAR ADENOPATHY: ICD-10-CM

## 2021-12-01 DIAGNOSIS — R51.9 NEW ONSET OF HEADACHES: ICD-10-CM

## 2021-12-01 DIAGNOSIS — E87.1 HYPONATREMIA: ICD-10-CM

## 2021-12-01 DIAGNOSIS — R19.8 GI SYMPTOMS: ICD-10-CM

## 2021-12-01 DIAGNOSIS — H65.92 FLUID LEVEL BEHIND TYMPANIC MEMBRANE OF LEFT EAR: ICD-10-CM

## 2021-12-01 PROCEDURE — 96375 TX/PRO/DX INJ NEW DRUG ADDON: CPT

## 2021-12-01 PROCEDURE — 96372 THER/PROPH/DIAG INJ SC/IM: CPT

## 2021-12-01 PROCEDURE — 99215 OFFICE O/P EST HI 40 MIN: CPT | Performed by: INTERNAL MEDICINE

## 2021-12-01 PROCEDURE — 96411 CHEMO IV PUSH ADDL DRUG: CPT

## 2021-12-01 PROCEDURE — 96417 CHEMO IV INFUS EACH ADDL SEQ: CPT

## 2021-12-01 PROCEDURE — 96367 TX/PROPH/DG ADDL SEQ IV INF: CPT

## 2021-12-01 PROCEDURE — 96413 CHEMO IV INFUSION 1 HR: CPT

## 2021-12-01 RX ORDER — CYANOCOBALAMIN 1000 UG/ML
1000 INJECTION INTRAMUSCULAR; SUBCUTANEOUS ONCE
Status: CANCELLED | OUTPATIENT
Start: 2021-12-01

## 2021-12-01 RX ORDER — CYANOCOBALAMIN 1000 UG/ML
1000 INJECTION INTRAMUSCULAR; SUBCUTANEOUS ONCE
Status: COMPLETED | OUTPATIENT
Start: 2021-12-01 | End: 2021-12-01

## 2021-12-01 RX ADMIN — CYANOCOBALAMIN 1000 MCG: 1000 INJECTION INTRAMUSCULAR; SUBCUTANEOUS at 12:34:00

## 2021-12-01 NOTE — PROGRESS NOTES
Pt here for C4D1 keytruda/alimta/carbo.   Arrives Ambulating independently, accompanied by Self           Pregnancy screening: Not applicable    Modifications in dose or schedule: No     Frequency of blood return and site check throughout administration: Pr Negative Screen

## 2021-12-01 NOTE — PROGRESS NOTES
Cancer Center Progress Note    Patient Name: Winston Jaquez   YOB: 1945   Medical Record Number: [id number]   CSN: [id number]   Attending Physician: Sander Stein M.D.    Referring Physician: DR Madelyn BERRY      Date of Visit: 12/1/2021 Therapeutic Phlebotomy :  If you feel faint or dizzy, lie down or sit with your head between your knees until the feeling disappears.    Drink an extra four glasses (8 ounces each) of non-alcoholic liquids.      8/25/21 which showed abnormal uptake involving a left supraclavicular, low paratracheal and retroperitoneal adenopathy as well as 4th right rib lesion.  Biopsy of the left supraclav node was performed on 9/3/21 which showed metastatic poorly differentiated Aspart Pen (NOVOLOG FLEXPEN RELION) 100 UNIT/ML Subcutaneous Solution Pen-injector, Inject 30 Units into the skin daily. , Disp: 5 each, Rfl: 1  •  Continuous Blood Gluc Sensor (FREESTYLE ANG 14 DAY SENSOR) Does not apply Misc, 1 each by Does not apply ro Tablet 24 Hr, Take 1 tablet by mouth daily. , Disp: 90 tablet, Rfl: 4  •  omeprazole (PRILOSEC) 20 MG Oral Capsule Delayed Release, Take 20 mg by mouth every morning., Disp: , Rfl:   •  aspirin 81 MG Oral Tab, Take 81 mg by mouth daily. , Disp: , Rfl:     Pa TONSILLECTOMY         Family Medical History:   Mother with h/o cancer    Psychosocial History:  Social History    Socioeconomic History      Marital status:       Spouse name: Not on file      Number of children: Not on file      Years of education: sounds. No hepatosplenomegaly. No palpable mass. Extremities: Pedal pulses are present. No BLE edema. Neurological: Grossly intact.        Laboratory:  Recent Results (from the past 24 hour(s))   COMP METABOLIC PANEL (14)    Collection Time: 12/01/21  8  08/04/2020 1138       Radiology:  PROCEDURE:  MRI BRAIN (W+WO) (CPT=70553)       COMPARISON:  None.       INDICATIONS:  pain to top of head and in left ear.  sent in by oncologist. hx of lung CA and has PE in right lung.       TECHNIQUE:  MRI of the INDICATIONS:  C34.81 Malignant neoplasm of overlapping sites of right lung (HCC)       TECHNIQUE:  IV contrast-enhanced scanning through the chest, abdomen, and pelvis was performed.  Dose reduction techniques were used.  Dose information is transmitted t enlargement.     AORTA:  No aneurysm or dissection.     RETROPERITONEUM:  Periaortic lymph nodes such as a left periaortic lymph node are enlarged measuring 32 x 28 mm which is stable since previous study.  In the region of the left renal vein there is a e Jewelene Litten, MD on 11/08/2021 at 1:24 PM            PROCEDURE:  PET/CT STANDARD BODY SCAN (ONCOLOGY) (VUW=84675)       COMPARISON:  [facility], CT CHEST+ABDOMEN+PELVIS(ALL CNTRST ONLY)(CPT=71260/59046), 4/22/2021, 9:29 AM.  [facility], PET/CT STANDARD B bladder.  Atherosclerosis. Domenica Duarter refer to   dedicated CT of the chest, abdomen, and pelvis recently performed on 4/22/2021 for further details.  Anterior to the left femoral neck is abnormal uptake with a maximum SUV of 3.1 may represent an inflammatory/ significant calcification.    PLEURA:  No mass or effusion.     CHEST WALL:  No mass or axillary adenopathy.     AORTA:  No aneurysm or dissection.     VASCULATURE:  No visible pulmonary arterial thrombus or attenuation.         ABDOMEN/PELVIS:   LIVER:  Mu to severe multilevel degenerative changes of the thoracic and lumbar spine.                        Impression   CONCLUSION:         1.  Increasing size of retroperitoneal lymphadenopathy concerning for worsening metastatic disease.  Possibility of other mónica calcium. CABG changes. PLEURA:  No mass or effusion. CHEST WALL:  No mass or axillary adenopathy. AORTA:  Mild atherosclerosis. No aneurysm or dissection. VASCULATURE:  No visible pulmonary arterial thrombus or attenuation.        ABDOMEN/PELVIS =====  CONCLUSION:    1. Mixed changes in 2 upper abdominal lymph nodes, 1 which demonstrates moderate increase in size and the other which demonstrates similar moderate decrease in size. A 3rd retroperitoneal lymph node is stable.   2. Stable small righ Systolic function was normal. The estimated ejection fraction was 60-65%.      There was no diagnostic evidence for regional wall motion abnormalities.      Features are consistent with a pseudonormal left ventricular filling      pattern, with concomitant valve:    Structurally normal valve.   Cusp separation was normal.   Doppler:  Transvalvular velocity was within the normal range. There was no   evidence for stenosis. There was no significant regurgitation.    Pericardium: Reyna Louis was no pericardial effusi 2.3    LA volume, S                            45    ml     18 - 58    LA volume/bsa, S                        22    ml/m^2 16 - 28    LA volume, ES, 1-p A4C                  49    ml     18 - 63    LA volume/bsa, ES, 1-p A4C              23    ml/m^2 ---- disease on IO but bulk of disease is still significantly less compared to previous. I feel IO is still controlling some of his disease. I therefore discussed adding chemotherapy to IO.  Has had 3 cycles of carbo/pem/pembro and scans recently done showed imp

## 2021-12-02 ENCOUNTER — TELEPHONE (OUTPATIENT)
Dept: HEMATOLOGY/ONCOLOGY | Facility: HOSPITAL | Age: 76
End: 2021-12-02

## 2021-12-02 NOTE — TELEPHONE ENCOUNTER
Rebecca Taylor, patient's wife called questioning why the CT scan is without no oral contrast? Please call to clarify. Thank you.

## 2021-12-20 ENCOUNTER — TELEPHONE (OUTPATIENT)
Dept: HEMATOLOGY/ONCOLOGY | Facility: HOSPITAL | Age: 76
End: 2021-12-20

## 2021-12-20 ENCOUNTER — HOSPITAL ENCOUNTER (OUTPATIENT)
Dept: CT IMAGING | Facility: HOSPITAL | Age: 76
Discharge: HOME | End: 2021-12-20
Attending: INTERNAL MEDICINE
Payer: MEDICARE

## 2021-12-20 DIAGNOSIS — C34.81 MALIGNANT NEOPLASM OF OVERLAPPING SITES OF RIGHT LUNG (HCC): ICD-10-CM

## 2021-12-20 PROCEDURE — 74177 CT ABD & PELVIS W/CONTRAST: CPT | Performed by: INTERNAL MEDICINE

## 2021-12-20 PROCEDURE — 71260 CT THORAX DX C+: CPT | Performed by: INTERNAL MEDICINE

## 2021-12-20 NOTE — TELEPHONE ENCOUNTER
Toxicities: C4 D1 Pembrolizumab/Pemetrexed Disodium/Carboplatin on 12/1/2021    Constipation     Constipation: Grade 1 Toyin Austins reports that he has not had a bowel movement in three to four days. He has been intermittently taking a stool softener.  He has not

## 2021-12-22 ENCOUNTER — OFFICE VISIT (OUTPATIENT)
Dept: HEMATOLOGY/ONCOLOGY | Facility: HOSPITAL | Age: 76
End: 2021-12-22
Attending: INTERNAL MEDICINE
Payer: MEDICARE

## 2021-12-22 ENCOUNTER — DOCUMENTATION ONLY (OUTPATIENT)
Dept: HEMATOLOGY/ONCOLOGY | Facility: HOSPITAL | Age: 76
End: 2021-12-22

## 2021-12-22 DIAGNOSIS — T88.7XXA MEDICATION SIDE EFFECTS: ICD-10-CM

## 2021-12-22 DIAGNOSIS — D72.820 MONOCLONAL B-CELL LYMPHOCYTOSIS: ICD-10-CM

## 2021-12-22 DIAGNOSIS — R59.0 HILAR ADENOPATHY: ICD-10-CM

## 2021-12-22 DIAGNOSIS — R51.9 NEW ONSET OF HEADACHES: ICD-10-CM

## 2021-12-22 DIAGNOSIS — C34.81 MALIGNANT NEOPLASM OF OVERLAPPING SITES OF RIGHT LUNG (HCC): Primary | ICD-10-CM

## 2021-12-22 DIAGNOSIS — R59.9 ADENOPATHY: ICD-10-CM

## 2021-12-22 DIAGNOSIS — E87.1 HYPONATREMIA: ICD-10-CM

## 2021-12-22 DIAGNOSIS — C78.7 LIVER METASTASES (HCC): ICD-10-CM

## 2021-12-22 DIAGNOSIS — I26.99 PE (PULMONARY THROMBOEMBOLISM) (HCC): ICD-10-CM

## 2021-12-22 PROCEDURE — 99215 OFFICE O/P EST HI 40 MIN: CPT | Performed by: INTERNAL MEDICINE

## 2021-12-22 RX ORDER — POLYETHYLENE GLYCOL 3350 17 G/17G
17 POWDER, FOR SOLUTION ORAL DAILY
COMMUNITY

## 2021-12-22 NOTE — PROGRESS NOTES
Cancer Center Progress Note    Patient Name: Uche Obregon   YOB: 1945   Medical Record Number: [id number]   CSN: [id number]   Attending Physician: Fernando Rodríguez M.D.    Referring Physician: DR Dalia BERRY      Date of Visit: 12/22/2021 differentiated carcinoma with squamous features c/w his original diagnosis. - Started carbo/pembro 9/29/21      - scans after 2 cycles showed response    2.   Monoclonal B cell lymphocytosis     - was noted to have persistent mild lymphocytosis going ba Prochlorperazine Maleate (COMPAZINE) 10 mg tablet, Take 1 tablet (10 mg total) by mouth every 6 (six) hours as needed for Nausea., Disp: 30 tablet, Rfl: 3  •  ondansetron (ZOFRAN) 8 MG tablet, Take 1 tablet (8 mg total) by mouth every 8 (eight) hours as ne Esophageal reflux    • Fatigue on & off   • Flatulence/gas pain/belching many years   • Frequent urination diabetic   • Hearing impairment    • High blood pressure    • High cholesterol    • Hoarseness, chronic on occasion   • Osteoarthrosis, unspecified w tobacco: Never Used    Vaping Use      Vaping Use: Never used    Substance and Sexual Activity      Alcohol use: Not Currently      Drug use: No      Sexual activity: Not on file    Other Topics      Concerns:        Not on file    Social History Narrative Results   Component Value Date     (H) 12/22/2021    BUN 27 (H) 12/22/2021    BUNCREA 22.2 (H) 07/26/2021    CREATSERUM 1.35 (H) 12/22/2021    ANIONGAP 6 12/22/2021    GFR 77 01/03/2018    GFRNAA 50 (L) 12/22/2021    GFRAA 58 (L) 12/22/2021    CA 9. series 3, image   54 is stable.  Wedge-shaped density superior segment left lower lobe series 3, image 60 is stable.    MEDIASTINUM:  A left supraclavicular lymph node series 2, image 9 measures 1.6 x 1.2 cm (previously 1.2 x 1 cm).  Right tracheobronchial obstruction, or bowel wall thickening.     ABDOMINAL WALL:  No mass or hernia.     URINARY BLADDER:  1.1 cm calcification within the bladder lumen is now noted right posterior bladder (previously left posterior bladder) and is probably a mobile bladder sto focal intracranial mass lesions or foci of abnormal enhancement noted.  There are multiple punctate   foci of periventricular and subcortical white matter change, consistent with chronic small vessel ischemic changes.    VENTRICLES/SULCI:   Ventricles and s 10 mm were previously measured (23 x 16 mm).  Small right precarinal lymph node measures 14 x 8 mm which is stable since previous study.    KT:  Right infrahilar lymph node measures 14 x 10 mm which is stable.  Right suprahilar lymph node measures 12 x 10 ABDOMINAL WALL:  No mass or hernia.     URINARY BLADDER: Lesly Willson is a calcification in the left aspect of the urinary bladder likely representing a bladder stone measuring up to 10 mm in size.    PELVIC NODES:  No adenopathy.     PELVIC ORGANS:  Moderate d TIME:         1300 hours   SCAN TIME:              1403 hours       FINDINGS:     ABNORMAL FOCI:  Abnormal FDG activity involves a left supraclavicular node adjacent to the left thyroid gland which now measures 2.3 x 1.6 versus 0.9 x 0.8 cm when compared t PROCEDURE:  CT CHEST+ABDOMEN+PELVIS(ALL CNTRST ONLY)(XQX=78705/36976)       COMPARISON:  [facility] , CT, CT CHEST+ABDOMEN+PELVIS(ALL CNTRST ONLY)(CPT=71260/99269), 1/18/2021, 9:20 AM.       INDICATIONS:  C34.81 Malignant neoplasm of overlapping sites x 20 mm in size. ADRENALS:  No mass or enlargement.     AORTA:  No aneurysm or dissection.  Moderate diffuse atherosclerotic plaque of the aorta iliac vessels.    RETROPERITONEUM:  A portal caval lymph node is increased in size measuring 26 x 20 mm were p [facility] , CT, CT CHEST+ABDOMEN+PELVIS(ALL CNTRST ONLY)(CPT=71260/45388), 7/29/2020, 10:37 AM.  Morton Epley , [place], PET/CT STANDARD BODY SCAN (ONCOLOGY) (CPT=78815), 10/23/2020, 9:49 AM.     INDICATIONS:  C34.81 Malignant neoplasm of overlapping sites of right lung AORTA/VASCULAR:  Atherosclerosis. No aneurysm. Circumaortic left renal vein. RETROPERITONEUM:  Lymph node between the inferior vena cava and proper hepatic artery measures 1.9 x 1.8 cm on image 28 of series 2, previously 2.7 x 2.5 cm.   Lymph node at t Echocardiogram     Name:Bess Luke     Date: 2021 :  1945 Ht:  (70in)  BP: 110 / 60   MRN:  [id number]    Age:  76years    Wt:  (200lb) HR: 55bpm   Loc:  [facility]        Gndr: M          BSA: 2.09m^2     Sonographer: Svetlana SHIPLEY   Ordering: function was normal. The estimated ejection fraction was 60-65%. There was no diagnostic evidence for regional wall motion abnormalities.    Features are consistent with a pseudonormal left ventricular filling   pattern, with concomitant abnormal relaxati ----------------------------------------------------------------------------   Measurements      Left ventricle                          Value        Reference    LV ID, ED, PLAX                         4.7   cm     4.2 - 5.9    LV ID, ES, PLAX         E/A ratio, peak                  1.1          ---------        Pulmonary arteries                      Value        Reference    PA pressure, S, DP                      29    mm Hg  ---------        Tricuspid valve                         Value        Refe basis.     4. CAD s/p CABG and multiple PCI- continue current cardiac regimen. ECHO just done showed no change from 2018. Has appointment with his cardiologist soon. 5. MBL- WBC and lymphs normal today. Likely chemo effect    6.  Hyponatremia- SIADH fro

## 2021-12-22 NOTE — PROGRESS NOTES
[facility] ordered online for Tumor only on specimen [id number] done 9/3/21 for xT - 648 gene panel, RNA Fusion, all required clinical information uploaded via [employer] site also in additional note session left if specimen is insufficient please request [id number]

## 2021-12-27 ENCOUNTER — DOCUMENTATION ONLY (OUTPATIENT)
Dept: HEMATOLOGY/ONCOLOGY | Facility: HOSPITAL | Age: 76
End: 2021-12-27

## 2021-12-27 NOTE — PROGRESS NOTES
Nutrition rescreen complete as triggered by [facility] dx of Stage IV NSCLC. Chart reviewed. Pt appears nutritionally stable at present. RD available on consult.

## 2021-12-28 ENCOUNTER — TELEPHONE (OUTPATIENT)
Dept: HEMATOLOGY/ONCOLOGY | Facility: HOSPITAL | Age: 76
End: 2021-12-28

## 2021-12-28 DIAGNOSIS — C34.81 MALIGNANT NEOPLASM OF OVERLAPPING SITES OF RIGHT LUNG (HCC): Primary | ICD-10-CM

## 2021-12-28 NOTE — TELEPHONE ENCOUNTER
Carey Morgan called again. He would like to speak with the nurse about his appointments tomorrow morning. He is confused as to what the appointment with Kb Holbrook is going to entail and when his chemo is actually going to start.  He would also like to know if he will b

## 2021-12-29 ENCOUNTER — OFFICE VISIT (OUTPATIENT)
Dept: HEMATOLOGY/ONCOLOGY | Facility: HOSPITAL | Age: 76
End: 2021-12-29
Attending: INTERNAL MEDICINE
Payer: MEDICARE

## 2021-12-29 VITALS
HEIGHT: 69.29 IN | OXYGEN SATURATION: 97 % | HEART RATE: 80 BPM | SYSTOLIC BLOOD PRESSURE: 159 MMHG | TEMPERATURE: 97 F | RESPIRATION RATE: 18 BRPM | DIASTOLIC BLOOD PRESSURE: 87 MMHG | WEIGHT: 195 LBS | BODY MASS INDEX: 28.55 KG/M2

## 2021-12-29 DIAGNOSIS — C34.81 MALIGNANT NEOPLASM OF OVERLAPPING SITES OF RIGHT LUNG (HCC): Primary | ICD-10-CM

## 2021-12-29 DIAGNOSIS — C34.81 MALIGNANT NEOPLASM OF OVERLAPPING SITES OF RIGHT LUNG (HCC): ICD-10-CM

## 2021-12-29 DIAGNOSIS — Z71.89 OTHER SPECIFIED COUNSELING: Primary | ICD-10-CM

## 2021-12-29 LAB
ALBUMIN SERPL-MCNC: 2.9 G/DL (ref 3.4–5)
ALBUMIN/GLOB SERPL: 0.7 {RATIO} (ref 1–2)
ALP LIVER SERPL-CCNC: 80 U/L
ALT SERPL-CCNC: 16 U/L
ANION GAP SERPL CALC-SCNC: 7 MMOL/L (ref 0–18)
AST SERPL-CCNC: 15 U/L (ref 15–37)
BASOPHILS # BLD AUTO: 0.03 X10(3) UL (ref 0–0.2)
BASOPHILS NFR BLD AUTO: 0.4 %
BILIRUB SERPL-MCNC: 0.3 MG/DL (ref 0.1–2)
BUN BLD-MCNC: 29 MG/DL (ref 7–18)
CALCIUM BLD-MCNC: 9.1 MG/DL (ref 8.5–10.1)
CEA SERPL-MCNC: 4.9 NG/ML (ref ?–5)
CHLORIDE SERPL-SCNC: 101 MMOL/L (ref 98–112)
CO2 SERPL-SCNC: 25 MMOL/L (ref 21–32)
CREAT BLD-MCNC: 1.33 MG/DL
EOSINOPHIL # BLD AUTO: 0.07 X10(3) UL (ref 0–0.7)
EOSINOPHIL NFR BLD AUTO: 0.8 %
ERYTHROCYTE [DISTWIDTH] IN BLOOD BY AUTOMATED COUNT: 17.2 %
FASTING STATUS PATIENT QL REPORTED: NO
GLOBULIN PLAS-MCNC: 4.3 G/DL (ref 2.8–4.4)
GLUCOSE BLD-MCNC: 220 MG/DL (ref 70–99)
HCT VFR BLD AUTO: 34.6 %
HGB BLD-MCNC: 11.1 G/DL
IMM GRANULOCYTES # BLD AUTO: 0.03 X10(3) UL (ref 0–1)
IMM GRANULOCYTES NFR BLD: 0.4 %
LDH SERPL L TO P-CCNC: 219 U/L
LYMPHOCYTES # BLD AUTO: 3.17 X10(3) UL (ref 1–4)
LYMPHOCYTES NFR BLD AUTO: 37.9 %
MCH RBC QN AUTO: 29.4 PG (ref 26–34)
MCHC RBC AUTO-ENTMCNC: 32.1 G/DL (ref 31–37)
MCV RBC AUTO: 91.8 FL
MONOCYTES # BLD AUTO: 0.54 X10(3) UL (ref 0.1–1)
MONOCYTES NFR BLD AUTO: 6.5 %
NEUTROPHILS # BLD AUTO: 4.52 X10 (3) UL (ref 1.5–7.7)
NEUTROPHILS # BLD AUTO: 4.52 X10(3) UL (ref 1.5–7.7)
NEUTROPHILS NFR BLD AUTO: 54 %
OSMOLALITY SERPL CALC.SUM OF ELEC: 289 MOSM/KG (ref 275–295)
PLATELET # BLD AUTO: 272 10(3)UL (ref 150–450)
POTASSIUM SERPL-SCNC: 4.6 MMOL/L (ref 3.5–5.1)
PROT SERPL-MCNC: 7.2 G/DL (ref 6.4–8.2)
RBC # BLD AUTO: 3.77 X10(6)UL
SODIUM SERPL-SCNC: 133 MMOL/L (ref 136–145)
T4 FREE SERPL-MCNC: 1.1 NG/DL (ref 0.8–1.7)
TSI SER-ACNC: 2.03 MIU/ML (ref 0.36–3.74)
WBC # BLD AUTO: 8.4 X10(3) UL (ref 4–11)

## 2021-12-29 PROCEDURE — 99215 OFFICE O/P EST HI 40 MIN: CPT | Performed by: CLINICAL NURSE SPECIALIST

## 2021-12-29 PROCEDURE — 96417 CHEMO IV INFUS EACH ADDL SEQ: CPT

## 2021-12-29 PROCEDURE — 96413 CHEMO IV INFUSION 1 HR: CPT

## 2021-12-29 NOTE — PROGRESS NOTES
Patient presents with:  Pt Ed: Immunotherapy education    Patient present to clinic for Immunotherapy education Opdivo/Yervoy followed by treatment.     Education Record    Learner:  Patient    Disease / Diagnosis:    Barriers / Limitations:  None   Comment

## 2021-12-29 NOTE — PROGRESS NOTES
Immunotherapy  Education     Learner:  Patient, Wife on the phone     Barriers / Limitations:  No     Immunotherapy  education goals:  · Learn the drug names  · Administration schedule  · Routes of administration  · Treatment setting     Drug names:  Harsha Richard effects and management, when to call provider and contact information.      Problem List:  Patient Active Problem List:     Angina pectoris, unstable ([facility] [address]     Coronary atherosclerosis of native coronary artery     Coronary atherosclerosis of unspecified ty ADLs independently.     Allergies:  No Known Allergies    Vital Signs:  /87 (BP Location: Right arm, Patient Position: Sitting, Cuff Size: adult)   Pulse 80   Temp 97 °F (36.1 °C) (Temporal)   Resp 18   Ht 1.76 m (5' 9.29\")   Wt 88.5 kg (195 lb)   Sp atorvastatin 10 MG Oral Tab, Take 10 mg by mouth nightly., Disp: , Rfl:   •  Isosorbide Mononitrate ER 30 MG Oral Tablet 24 Hr, Take 30 mg by mouth daily. , Disp: , Rfl:   •  Insulin Pen Needle (NOVOFINE) 32G X 6 MM Does not apply Misc, 1 each by Other rout -American 59 (L) >=60    AST 15 15 - 37 U/L    ALT 16 16 - 61 U/L    Alkaline Phosphatase 80 45 - 117 U/L    Bilirubin, Total 0.3 0.1 - 2.0 mg/dL    Total Protein 7.2 6.4 - 8.2 g/dL    Albumin 2.9 (L) 3.4 - 5.0 g/dL    Globulin  4.3 2.8 - 4.4 g/dL

## 2021-12-29 NOTE — PROGRESS NOTES
Pt here for C 1 D 1 Opdivo/yervoy.   Arrives Ambulating independently, accompanied by Self           Pregnancy screening: Not applicable    Modifications in dose or schedule: No     Frequency of blood return and site check throughout administration: Prior t

## 2021-12-30 ENCOUNTER — TELEPHONE (OUTPATIENT)
Dept: HEMATOLOGY/ONCOLOGY | Facility: HOSPITAL | Age: 76
End: 2021-12-30

## 2021-12-30 NOTE — TELEPHONE ENCOUNTER
Toxicities: C1 D1 Ipilimumab/Nivolumab on 12/29/2021    I attempted to reach Julio Martínez to see how he is feeling after his first treatment. I left a voice mail message asking him to please return my call at his earliest convenience.

## 2021-12-30 NOTE — TELEPHONE ENCOUNTER
Selina Brasher reports that is is feeling well. He is a little constipated. He has been taking miralax sporadically. I recommended he take it daily and drink 64-80 oz of caffeine free fluids daily. Selina Brasher said other than that he is feeling well.  I encouraged him ot

## 2022-01-01 ENCOUNTER — TELEPHONE (OUTPATIENT)
Dept: HEMATOLOGY/ONCOLOGY | Age: 77
End: 2022-01-01

## 2022-01-01 ENCOUNTER — TELEPHONE (OUTPATIENT)
Dept: HEMATOLOGY/ONCOLOGY | Facility: HOSPITAL | Age: 77
End: 2022-01-01

## 2022-01-01 ENCOUNTER — APPOINTMENT (OUTPATIENT)
Dept: HEMATOLOGY/ONCOLOGY | Age: 77
End: 2022-01-01
Attending: INTERNAL MEDICINE
Payer: MEDICARE

## 2022-01-01 ENCOUNTER — TELEPHONE (OUTPATIENT)
Dept: CARDIOLOGY UNIT | Facility: HOSPITAL | Age: 77
End: 2022-01-01

## 2022-01-01 ENCOUNTER — LAB REQUISITION (OUTPATIENT)
Dept: LAB | Age: 77
End: 2022-01-01
Payer: MEDICARE

## 2022-01-01 ENCOUNTER — NURSE ONLY (OUTPATIENT)
Dept: LAB | Facility: HOSPITAL | Age: 77
End: 2022-01-01
Attending: INTERNAL MEDICINE
Payer: MEDICARE

## 2022-01-01 DIAGNOSIS — B37.9 CANDIDIASIS, UNSPECIFIED: ICD-10-CM

## 2022-01-01 DIAGNOSIS — R89.7 PODOCYTE FOOT PROCESS EFFACEMENT PRESENT ON ELECTRON MICROSCOPY OF RENAL BIOPSY: ICD-10-CM

## 2022-01-01 DIAGNOSIS — C34.90 MALIGNANT NEOPLASM OF UNSPECIFIED PART OF UNSPECIFIED BRONCHUS OR LUNG (HCC): ICD-10-CM

## 2022-01-01 DIAGNOSIS — R80.9 PROTEINURIA, UNSPECIFIED TYPE: ICD-10-CM

## 2022-01-01 DIAGNOSIS — I82.4Y1 ACUTE DEEP VEIN THROMBOSIS (DVT) OF PROXIMAL VEIN OF RIGHT LOWER EXTREMITY (HCC): ICD-10-CM

## 2022-01-01 DIAGNOSIS — R80.9 NEPHROTIC RANGE PROTEINURIA: ICD-10-CM

## 2022-01-01 LAB
ANION GAP SERPL CALC-SCNC: 13 MMOL/L (ref 0–18)
BUN BLD-MCNC: 65 MG/DL (ref 7–18)
BUN/CREAT SERPL: 30.7 (ref 10–20)
CALCIUM BLD-MCNC: 8.2 MG/DL (ref 8.5–10.1)
CHLORIDE SERPL-SCNC: 100 MMOL/L (ref 98–112)
CO2 SERPL-SCNC: 21 MMOL/L (ref 21–32)
CREAT BLD-MCNC: 2.12 MG/DL
CYCLOSPORINE A: 67.8 NG/ML
ERYTHROCYTE [DISTWIDTH] IN BLOOD BY AUTOMATED COUNT: 14.9 %
GFR SERPLBLD BASED ON 1.73 SQ M-ARVRAT: 31 ML/MIN/1.73M2 (ref 60–?)
GLUCOSE BLD-MCNC: 83 MG/DL (ref 70–99)
HCT VFR BLD AUTO: 34.9 %
HGB BLD-MCNC: 11.4 G/DL
INR BLD: 1.12 (ref 0.8–1.2)
MCH RBC QN AUTO: 29.3 PG (ref 26–34)
MCHC RBC AUTO-ENTMCNC: 32.7 G/DL (ref 31–37)
MCV RBC AUTO: 89.7 FL
OSMOLALITY SERPL CALC.SUM OF ELEC: 296 MOSM/KG (ref 275–295)
PLATELET # BLD AUTO: 181 10(3)UL (ref 150–450)
POTASSIUM SERPL-SCNC: 4.9 MMOL/L (ref 3.5–5.1)
PROTHROMBIN TIME: 14.4 SECONDS (ref 11.6–14.8)
RBC # BLD AUTO: 3.89 X10(6)UL
SODIUM SERPL-SCNC: 134 MMOL/L (ref 136–145)
WBC # BLD AUTO: 8.2 X10(3) UL (ref 4–11)

## 2022-01-01 PROCEDURE — 80158 DRUG ASSAY CYCLOSPORINE: CPT | Performed by: INTERNAL MEDICINE

## 2022-01-01 PROCEDURE — 36415 COLL VENOUS BLD VENIPUNCTURE: CPT

## 2022-01-01 PROCEDURE — 85027 COMPLETE CBC AUTOMATED: CPT

## 2022-01-01 PROCEDURE — 85610 PROTHROMBIN TIME: CPT

## 2022-01-01 PROCEDURE — 80048 BASIC METABOLIC PNL TOTAL CA: CPT | Performed by: INTERNAL MEDICINE

## 2022-01-01 RX ORDER — HYDROCODONE BITARTRATE AND ACETAMINOPHEN 5; 325 MG/1; MG/1
1 TABLET ORAL EVERY 6 HOURS PRN
Qty: 60 TABLET | Refills: 0 | Status: CANCELLED | OUTPATIENT
Start: 2022-01-01

## 2022-01-01 RX ORDER — SULFAMETHOXAZOLE AND TRIMETHOPRIM 800; 160 MG/1; MG/1
TABLET ORAL
Qty: 15 TABLET | Refills: 0 | OUTPATIENT
Start: 2022-01-01

## 2022-01-01 RX ORDER — MYCOPHENOLATE MOFETIL 250 MG/1
1000 CAPSULE ORAL
Qty: 120 CAPSULE | Refills: 2 | OUTPATIENT
Start: 2022-01-01

## 2022-01-01 RX ORDER — MIRTAZAPINE 15 MG/1
TABLET, FILM COATED ORAL
Qty: 90 TABLET | Refills: 0 | OUTPATIENT
Start: 2022-01-01

## 2022-01-03 ENCOUNTER — TELEPHONE (OUTPATIENT)
Dept: HEMATOLOGY/ONCOLOGY | Facility: HOSPITAL | Age: 77
End: 2022-01-03

## 2022-01-03 NOTE — TELEPHONE ENCOUNTER
Fuad Singer, per Dr Denilson Rm he should take 1/2 bottle of Mag Citrate and supp. If no BM in 1-2 hours drink the other half of the Mag Citrate. He verbalizes understanding.

## 2022-01-03 NOTE — TELEPHONE ENCOUNTER
Darron Grande he is having ongoing constipation despite, Miralax daily, MOM 30 ml BID, stool softeners x 2 daily in am.  This has been going on for weeks.   Denies fevers, no sob or chest pain, denies nausea or vomiting, appetite fair, denies lightheadedness

## 2022-01-03 NOTE — TELEPHONE ENCOUNTER
Patient called and said he has had constipation for months. Please call patient Diane Razo at [phone]. Thank you.

## 2022-01-05 ENCOUNTER — HOSPITAL ENCOUNTER (OUTPATIENT)
Dept: GENERAL RADIOLOGY | Age: 77
Discharge: HOME | End: 2022-01-05
Attending: NURSE PRACTITIONER
Payer: MEDICARE

## 2022-01-05 ENCOUNTER — OFFICE VISIT (OUTPATIENT)
Dept: HEMATOLOGY/ONCOLOGY | Age: 77
End: 2022-01-05
Attending: INTERNAL MEDICINE
Payer: MEDICARE

## 2022-01-05 ENCOUNTER — TELEPHONE (OUTPATIENT)
Dept: HEMATOLOGY/ONCOLOGY | Facility: HOSPITAL | Age: 77
End: 2022-01-05

## 2022-01-05 ENCOUNTER — TELEPHONE (OUTPATIENT)
Dept: HEMATOLOGY/ONCOLOGY | Age: 77
End: 2022-01-05

## 2022-01-05 VITALS
HEART RATE: 103 BPM | DIASTOLIC BLOOD PRESSURE: 81 MMHG | BODY MASS INDEX: 29 KG/M2 | OXYGEN SATURATION: 98 % | SYSTOLIC BLOOD PRESSURE: 131 MMHG | WEIGHT: 195 LBS | RESPIRATION RATE: 18 BRPM | TEMPERATURE: 97 F

## 2022-01-05 DIAGNOSIS — K62.89 RECTAL PAIN: ICD-10-CM

## 2022-01-05 DIAGNOSIS — K59.00 CONSTIPATION, UNSPECIFIED CONSTIPATION TYPE: Primary | ICD-10-CM

## 2022-01-05 DIAGNOSIS — R10.84 GENERALIZED ABDOMINAL PAIN: ICD-10-CM

## 2022-01-05 DIAGNOSIS — C34.81 MALIGNANT NEOPLASM OF OVERLAPPING SITES OF RIGHT LUNG (HCC): ICD-10-CM

## 2022-01-05 DIAGNOSIS — R10.84 GENERALIZED ABDOMINAL PAIN: Primary | ICD-10-CM

## 2022-01-05 DIAGNOSIS — K59.00 CONSTIPATION, UNSPECIFIED CONSTIPATION TYPE: ICD-10-CM

## 2022-01-05 PROCEDURE — 74019 RADEX ABDOMEN 2 VIEWS: CPT | Performed by: NURSE PRACTITIONER

## 2022-01-05 PROCEDURE — 99214 OFFICE O/P EST MOD 30 MIN: CPT | Performed by: NURSE PRACTITIONER

## 2022-01-05 RX ORDER — FOLIC ACID 1 MG/1
TABLET ORAL DAILY
COMMUNITY

## 2022-01-05 RX ORDER — PROCHLORPERAZINE MALEATE 10 MG
10 TABLET ORAL EVERY 6 HOURS PRN
COMMUNITY

## 2022-01-05 RX ORDER — ONDANSETRON 8 MG/1
8 TABLET, ORALLY DISINTEGRATING ORAL EVERY 8 HOURS PRN
COMMUNITY
End: 2022-01-11

## 2022-01-05 NOTE — TELEPHONE ENCOUNTER
Toxicities: C1 D1 Ipilimumab/Nivolumab on 12/29/2021     Constipation     Constipation: Grade 2 (Patient called 1/3/2022 with complaint of constipation. He had been taking miralax and stool softener daily.  Dr Kirby Dao recommend 1/2 a bottle of magnesium cit

## 2022-01-05 NOTE — TELEPHONE ENCOUNTER
Say calling from [facility].     Has questions regarding Medical HX,    Please return call to Say  At [phone]  Option 1

## 2022-01-05 NOTE — PROGRESS NOTES
Patient is here today for followup with Silvia LAUREANO for Lung Cancer. Patient stated rectal area pain is rated a 9-10 on a scale of 0-10.  Stated constipated - small amount of stool on Tuesday 1/4/2022 after taking a bottle of magnesium citrate, milk

## 2022-01-06 ENCOUNTER — TELEPHONE (OUTPATIENT)
Dept: HEMATOLOGY/ONCOLOGY | Facility: HOSPITAL | Age: 77
End: 2022-01-06

## 2022-01-06 NOTE — TELEPHONE ENCOUNTER
Called patient for update after our ACV yesterday. He tried one fleets enema without success, but states that it was not a successful administration (most of the fluid leaked onto the floor). He took one dose of miralax last night and again this morning.

## 2022-01-11 ENCOUNTER — OFFICE VISIT (OUTPATIENT)
Dept: HEMATOLOGY/ONCOLOGY | Age: 77
End: 2022-01-11
Attending: INTERNAL MEDICINE
Payer: MEDICARE

## 2022-01-11 VITALS
HEART RATE: 68 BPM | SYSTOLIC BLOOD PRESSURE: 148 MMHG | WEIGHT: 195.13 LBS | BODY MASS INDEX: 28.57 KG/M2 | RESPIRATION RATE: 18 BRPM | TEMPERATURE: 97 F | OXYGEN SATURATION: 96 % | DIASTOLIC BLOOD PRESSURE: 79 MMHG | HEIGHT: 69.29 IN

## 2022-01-11 DIAGNOSIS — R59.0 HILAR ADENOPATHY: ICD-10-CM

## 2022-01-11 DIAGNOSIS — R10.84 GENERALIZED ABDOMINAL PAIN: ICD-10-CM

## 2022-01-11 DIAGNOSIS — C78.7 LIVER METASTASES (HCC): ICD-10-CM

## 2022-01-11 DIAGNOSIS — I26.99 PE (PULMONARY THROMBOEMBOLISM) (HCC): ICD-10-CM

## 2022-01-11 DIAGNOSIS — D72.820 MONOCLONAL B-CELL LYMPHOCYTOSIS: ICD-10-CM

## 2022-01-11 DIAGNOSIS — C34.81 MALIGNANT NEOPLASM OF OVERLAPPING SITES OF RIGHT LUNG (HCC): Primary | ICD-10-CM

## 2022-01-11 DIAGNOSIS — E87.1 HYPONATREMIA: ICD-10-CM

## 2022-01-11 DIAGNOSIS — R59.9 ADENOPATHY: ICD-10-CM

## 2022-01-11 DIAGNOSIS — K59.00 CONSTIPATION, UNSPECIFIED CONSTIPATION TYPE: ICD-10-CM

## 2022-01-11 LAB
ALBUMIN SERPL-MCNC: 3.1 G/DL (ref 3.4–5)
ALBUMIN/GLOB SERPL: 0.8 {RATIO} (ref 1–2)
ALP LIVER SERPL-CCNC: 87 U/L
ALT SERPL-CCNC: 16 U/L
ANION GAP SERPL CALC-SCNC: 5 MMOL/L (ref 0–18)
AST SERPL-CCNC: 19 U/L (ref 15–37)
BASOPHILS # BLD AUTO: 0.04 X10(3) UL (ref 0–0.2)
BASOPHILS NFR BLD AUTO: 0.6 %
BILIRUB SERPL-MCNC: 0.3 MG/DL (ref 0.1–2)
BUN BLD-MCNC: 27 MG/DL (ref 7–18)
CALCIUM BLD-MCNC: 9.4 MG/DL (ref 8.5–10.1)
CHLORIDE SERPL-SCNC: 104 MMOL/L (ref 98–112)
CO2 SERPL-SCNC: 27 MMOL/L (ref 21–32)
CREAT BLD-MCNC: 1.04 MG/DL
EOSINOPHIL # BLD AUTO: 0.2 X10(3) UL (ref 0–0.7)
EOSINOPHIL NFR BLD AUTO: 2.8 %
ERYTHROCYTE [DISTWIDTH] IN BLOOD BY AUTOMATED COUNT: 15.9 %
FASTING STATUS PATIENT QL REPORTED: NO
GLOBULIN PLAS-MCNC: 4 G/DL (ref 2.8–4.4)
GLUCOSE BLD-MCNC: 163 MG/DL (ref 70–99)
HCT VFR BLD AUTO: 34.2 %
HGB BLD-MCNC: 11.1 G/DL
IMM GRANULOCYTES # BLD AUTO: 0.02 X10(3) UL (ref 0–1)
IMM GRANULOCYTES NFR BLD: 0.3 %
LYMPHOCYTES # BLD AUTO: 2.86 X10(3) UL (ref 1–4)
LYMPHOCYTES NFR BLD AUTO: 39.4 %
MCH RBC QN AUTO: 30.3 PG (ref 26–34)
MCHC RBC AUTO-ENTMCNC: 32.5 G/DL (ref 31–37)
MCV RBC AUTO: 93.4 FL
MONOCYTES # BLD AUTO: 0.46 X10(3) UL (ref 0.1–1)
MONOCYTES NFR BLD AUTO: 6.3 %
NEUTROPHILS # BLD AUTO: 3.68 X10 (3) UL (ref 1.5–7.7)
NEUTROPHILS # BLD AUTO: 3.68 X10(3) UL (ref 1.5–7.7)
NEUTROPHILS NFR BLD AUTO: 50.6 %
OSMOLALITY SERPL CALC.SUM OF ELEC: 291 MOSM/KG (ref 275–295)
PLATELET # BLD AUTO: 294 10(3)UL (ref 150–450)
POTASSIUM SERPL-SCNC: 4.7 MMOL/L (ref 3.5–5.1)
PROT SERPL-MCNC: 7.1 G/DL (ref 6.4–8.2)
RBC # BLD AUTO: 3.66 X10(6)UL
SODIUM SERPL-SCNC: 136 MMOL/L (ref 136–145)
WBC # BLD AUTO: 7.3 X10(3) UL (ref 4–11)

## 2022-01-11 PROCEDURE — 96413 CHEMO IV INFUSION 1 HR: CPT

## 2022-01-11 PROCEDURE — 99215 OFFICE O/P EST HI 40 MIN: CPT | Performed by: INTERNAL MEDICINE

## 2022-01-11 NOTE — PROGRESS NOTES
Cancer Center Progress Note    Patient Name: Herson Xie   YOB: 1945   Medical Record Number: [id number]   CSN: [id number]   Attending Physician: Joseph Marte M.D.    Referring Physician: DR Yoli BERRY      Date of Visit: 1/11/2022 low paratracheal and retroperitoneal adenopathy as well as 4th right rib lesion. 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      - Star (NOVOLOG FLEXPEN RELION) 100 UNIT/ML Subcutaneous Solution Pen-injector, Inject 30 Units into the skin daily. , Disp: 5 each, Rfl: 1  •  Continuous Blood Gluc Sensor (FREESTYLE ANG 14 DAY SENSOR) Does not apply Misc, 1 each by Does not apply route every 1 impairment    • High blood pressure    • High cholesterol    • Hoarseness, chronic on occasion   • Osteoarthrosis, unspecified whether generalized or localized, unspecified site    • Other and unspecified hyperlipidemia    • Pain in joints several years Drug use: No      Sexual activity: Not on file    Other Topics      Concerns:        Not on file    Social History Narrative      Not on file    Social Determinants of Health  Financial Resource Strain: Not on file  Food Insecurity: Not on file  SHELLIE 01/11/2022    ANIONGAP 5 01/11/2022    GFR 77 01/03/2018    GFRNAA 69 01/11/2022    GFRAA 80 01/11/2022    CA 9.4 01/11/2022    OSMOCALC 291 01/11/2022    ALKPHO 87 01/11/2022    AST 19 01/11/2022    ALT 16 01/11/2022    BILT 0.3 01/11/2022    TP 7.1 01/11 supraclavicular lymph node series 2, image 9 measures 1.6 x 1.2 cm (previously 1.2 x 1 cm).  Right tracheobronchial angle lymph node series 2, image 57 measures 1 x 0.5 cm (previously 1.4 x 0.7 cm).    KT:  Confluent soft tissue right hilus on series 2, i bladder lumen is now noted right posterior bladder (previously left posterior bladder) and is probably a mobile bladder stone. PELVIC NODES:  No adenopathy.     PELVIC ORGANS: [facility] is marked enlargement of the prostate.    BONES:  Subtle lytic focus ant subcortical white matter change, consistent with chronic small vessel ischemic changes. VENTRICLES/SULCI:   Ventricles and sulci are normal in caliber.  There are no extra-axial fluid collections. Yareli Poisson is no midline shift.    SINUSES/ORBITS:       The v study.   KT:  Right infrahilar lymph node measures 14 x 10 mm which is stable.  Right suprahilar lymph node measures 12 x 10 mm. CARDIAC:  Severe coronary calcifications. PLEURA:  No mass or effusion.     CHEST WALL:  No mass or axillary adenopathy. representing a bladder stone measuring up to 10 mm in size.    PELVIC NODES:  No adenopathy.     PELVIC ORGANS:  Moderate diffuse prostatic hypertrophy.     BONES:  Stable lytic lesion in the anterior right 4th rib consistent with a stable metastatic lesion left supraclavicular node adjacent to the left thyroid gland which now measures 2.3 x 1.6 versus 0.9 x 0.8 cm when compared to most recent PET performed 10/23/2020 with a maximum SUV of 9.7.  Previously   described right low paratracheal node measures 5.4 CHEST+ABDOMEN+PELVIS(ALL CNTRST ONLY)(CPT=71260/07003), 1/18/2021, 9:20 AM.       INDICATIONS:  C34.81 Malignant neoplasm of overlapping sites of right lung ([address]       TECHNIQUE:  IV contrast-enhanced scanning through the chest, abdomen, and pelvis was pe atherosclerotic plaque of the aorta iliac vessels. RETROPERITONEUM:  A portal caval lymph node is increased in size measuring 26 x 20 mm were previously measured (22 x 17 mm) on image number 183.  A lymph node just anterior to the bifurcation of the nay PET/CT STANDARD BODY SCAN (ONCOLOGY) (CPT=78815), 10/23/2020, 9:49 AM.     INDICATIONS:  C34.81 Malignant neoplasm of overlapping sites of right lung ([address]     TECHNIQUE:  IV contrast-enhanced scanning through the chest, abdomen, and pelvis was performed. between the inferior vena cava and proper hepatic artery measures 1.9 x 1.8 cm on image 28 of series 2, previously 2.7 x 2.5 cm.   Lymph node at the branch point of the celiac artery between the proper hepatic and splenic   arteries measures 2.1 x 2.1 cm on  Age:  76years    Wt:  (200lb) HR: 55bpm   Loc:  [facility]        Gndr: M          BSA: 4.12m^2     Sonographer: Svetlana SHIPLEY   Ordering:   Keith        Referring: Freedom De La O I     -------------------------------------------------------------- abnormalities. Features are consistent with a pseudonormal left ventricular filling   pattern, with concomitant abnormal relaxation and increased filling pressure   - grade 2 diastolic dysfunction. Left atrium:  The left atrium was normal in size.  The PLAX                         4.7   cm     4.2 - 5.9    LV ID, ES, PLAX                         2.9   cm     ---------    LV fx shortening, PLAX                  38    %      25 - 43    LV mid-wall fx shortening, PLAX         17    %      14 - 22    LV PW t Hg  ---------        Tricuspid valve                         Value        Reference    Tricuspid regurg peak velocity          2.47  m/sec  ---------    Tricuspid peak RV-RA gradient           24    mm Hg  ---------        Systemic veins                   better    Continue Ipi+Nivo  If tolerates ok may change to more convenient schedule    RTC 2 /8/21    Risk level: High- metastatic lung cancer with progression      Freedom Ramirez MD  [facility]

## 2022-01-11 NOTE — PROGRESS NOTES
Pt here for C1D15.   Arrives Ambulating independently, accompanied by Self          Modifications in dose or schedule: No    Pt denies current pregnancy     Frequency of blood return and site check throughout administration: Prior to administration   Leonel

## 2022-01-12 ENCOUNTER — APPOINTMENT (OUTPATIENT)
Dept: HEMATOLOGY/ONCOLOGY | Facility: HOSPITAL | Age: 77
End: 2022-01-12
Attending: INTERNAL MEDICINE
Payer: MEDICARE

## 2022-01-25 ENCOUNTER — OFFICE VISIT (OUTPATIENT)
Dept: HEMATOLOGY/ONCOLOGY | Age: 77
End: 2022-01-25
Attending: INTERNAL MEDICINE
Payer: MEDICARE

## 2022-01-25 VITALS
SYSTOLIC BLOOD PRESSURE: 148 MMHG | OXYGEN SATURATION: 98 % | RESPIRATION RATE: 18 BRPM | DIASTOLIC BLOOD PRESSURE: 70 MMHG | HEIGHT: 69.29 IN | WEIGHT: 198.69 LBS | BODY MASS INDEX: 29.09 KG/M2 | TEMPERATURE: 97 F | HEART RATE: 61 BPM

## 2022-01-25 DIAGNOSIS — C34.81 MALIGNANT NEOPLASM OF OVERLAPPING SITES OF RIGHT LUNG (HCC): Primary | ICD-10-CM

## 2022-01-25 LAB
ALBUMIN SERPL-MCNC: 3 G/DL (ref 3.4–5)
ALBUMIN/GLOB SERPL: 0.8 {RATIO} (ref 1–2)
ALP LIVER SERPL-CCNC: 90 U/L
ALT SERPL-CCNC: 13 U/L
ANION GAP SERPL CALC-SCNC: 6 MMOL/L (ref 0–18)
AST SERPL-CCNC: 16 U/L (ref 15–37)
BASOPHILS # BLD AUTO: 0.04 X10(3) UL (ref 0–0.2)
BASOPHILS NFR BLD AUTO: 0.7 %
BILIRUB SERPL-MCNC: 0.3 MG/DL (ref 0.1–2)
BUN BLD-MCNC: 19 MG/DL (ref 7–18)
CALCIUM BLD-MCNC: 9 MG/DL (ref 8.5–10.1)
CHLORIDE SERPL-SCNC: 105 MMOL/L (ref 98–112)
CO2 SERPL-SCNC: 25 MMOL/L (ref 21–32)
CREAT BLD-MCNC: 1.01 MG/DL
EOSINOPHIL # BLD AUTO: 0.37 X10(3) UL (ref 0–0.7)
EOSINOPHIL NFR BLD AUTO: 6.1 %
ERYTHROCYTE [DISTWIDTH] IN BLOOD BY AUTOMATED COUNT: 14.9 %
FASTING STATUS PATIENT QL REPORTED: NO
GLOBULIN PLAS-MCNC: 3.8 G/DL (ref 2.8–4.4)
GLUCOSE BLD-MCNC: 226 MG/DL (ref 70–99)
HCT VFR BLD AUTO: 35.2 %
HGB BLD-MCNC: 11.3 G/DL
IMM GRANULOCYTES # BLD AUTO: 0.01 X10(3) UL (ref 0–1)
IMM GRANULOCYTES NFR BLD: 0.2 %
LYMPHOCYTES # BLD AUTO: 2.31 X10(3) UL (ref 1–4)
LYMPHOCYTES NFR BLD AUTO: 38.3 %
MCH RBC QN AUTO: 30 PG (ref 26–34)
MCHC RBC AUTO-ENTMCNC: 32.1 G/DL (ref 31–37)
MCV RBC AUTO: 93.4 FL
MONOCYTES # BLD AUTO: 0.3 X10(3) UL (ref 0.1–1)
MONOCYTES NFR BLD AUTO: 5 %
NEUTROPHILS # BLD AUTO: 3 X10 (3) UL (ref 1.5–7.7)
NEUTROPHILS # BLD AUTO: 3 X10(3) UL (ref 1.5–7.7)
NEUTROPHILS NFR BLD AUTO: 49.7 %
OSMOLALITY SERPL CALC.SUM OF ELEC: 291 MOSM/KG (ref 275–295)
PLATELET # BLD AUTO: 238 10(3)UL (ref 150–450)
POTASSIUM SERPL-SCNC: 3.9 MMOL/L (ref 3.5–5.1)
PROT SERPL-MCNC: 6.8 G/DL (ref 6.4–8.2)
RBC # BLD AUTO: 3.77 X10(6)UL
SODIUM SERPL-SCNC: 136 MMOL/L (ref 136–145)
T4 FREE SERPL-MCNC: 1.2 NG/DL (ref 0.8–1.7)
TSI SER-ACNC: 2.37 MIU/ML (ref 0.36–3.74)
WBC # BLD AUTO: 6 X10(3) UL (ref 4–11)

## 2022-01-25 PROCEDURE — 80053 COMPREHEN METABOLIC PANEL: CPT

## 2022-01-25 PROCEDURE — 84439 ASSAY OF FREE THYROXINE: CPT

## 2022-01-25 PROCEDURE — 84443 ASSAY THYROID STIM HORMONE: CPT

## 2022-01-25 PROCEDURE — 96413 CHEMO IV INFUSION 1 HR: CPT

## 2022-01-25 PROCEDURE — 85025 COMPLETE CBC W/AUTO DIFF WBC: CPT

## 2022-01-25 PROCEDURE — 36415 COLL VENOUS BLD VENIPUNCTURE: CPT

## 2022-01-25 NOTE — PROGRESS NOTES
Comments:  Patient has new pitting edema in RUE and BLE. (+2, +3) No other new symptoms. Per patient, he has an appointment with cardiologist on Thursday. GEORGI Greco was notified and assessed patient. Per chris Wilkins to proceed with Ryann Marcus today. Dania Evans .Pt h

## 2022-02-01 ENCOUNTER — APPOINTMENT (OUTPATIENT)
Dept: HEMATOLOGY/ONCOLOGY | Age: 77
End: 2022-02-01
Attending: INTERNAL MEDICINE
Payer: MEDICARE

## 2022-02-02 ENCOUNTER — TELEPHONE (OUTPATIENT)
Dept: HEMATOLOGY/ONCOLOGY | Age: 77
End: 2022-02-02

## 2022-02-02 NOTE — TELEPHONE ENCOUNTER
Patient called and his cardiologist wants him to get a CT of the chest for PE. Patient also said that Dr. Lewis Ellis wants to get him a CT. Patient wants to know if he needs two separate tests. Please call patient Brian at [phone].

## 2022-02-03 ENCOUNTER — HOSPITAL ENCOUNTER (OUTPATIENT)
Dept: CT IMAGING | Age: 77
Discharge: HOME | End: 2022-02-03
Attending: INTERNAL MEDICINE
Payer: MEDICARE

## 2022-02-03 DIAGNOSIS — Z85.118 HX OF CANCER OF LUNG: ICD-10-CM

## 2022-02-03 PROCEDURE — 71260 CT THORAX DX C+: CPT | Performed by: INTERNAL MEDICINE

## 2022-02-03 RX ORDER — IOHEXOL 350 MG/ML
100 INJECTION, SOLUTION INTRAVENOUS
Status: COMPLETED | OUTPATIENT
Start: 2022-02-03 | End: 2022-02-03

## 2022-02-03 RX ADMIN — IOHEXOL 100 ML: 350 INJECTION, SOLUTION INTRAVENOUS at 08:35:00

## 2022-02-08 ENCOUNTER — OFFICE VISIT (OUTPATIENT)
Dept: HEMATOLOGY/ONCOLOGY | Age: 77
End: 2022-02-08
Attending: INTERNAL MEDICINE
Payer: MEDICARE

## 2022-02-08 VITALS
SYSTOLIC BLOOD PRESSURE: 156 MMHG | HEART RATE: 72 BPM | HEIGHT: 69.29 IN | BODY MASS INDEX: 28.99 KG/M2 | OXYGEN SATURATION: 97 % | DIASTOLIC BLOOD PRESSURE: 74 MMHG | TEMPERATURE: 96 F | RESPIRATION RATE: 18 BRPM | WEIGHT: 198 LBS

## 2022-02-08 DIAGNOSIS — R59.9 ADENOPATHY: ICD-10-CM

## 2022-02-08 DIAGNOSIS — I26.99 PE (PULMONARY THROMBOEMBOLISM) (HCC): ICD-10-CM

## 2022-02-08 DIAGNOSIS — T88.7XXA MEDICATION SIDE EFFECTS: ICD-10-CM

## 2022-02-08 DIAGNOSIS — D72.820 MONOCLONAL B-CELL LYMPHOCYTOSIS: ICD-10-CM

## 2022-02-08 DIAGNOSIS — E87.1 HYPONATREMIA: ICD-10-CM

## 2022-02-08 DIAGNOSIS — C34.81 MALIGNANT NEOPLASM OF OVERLAPPING SITES OF RIGHT LUNG (HCC): Primary | ICD-10-CM

## 2022-02-08 DIAGNOSIS — C78.7 LIVER METASTASES (HCC): ICD-10-CM

## 2022-02-08 DIAGNOSIS — C34.81 MALIGNANT NEOPLASM OF OVERLAPPING SITES OF RIGHT LUNG (HCC): ICD-10-CM

## 2022-02-08 DIAGNOSIS — R59.0 HILAR ADENOPATHY: ICD-10-CM

## 2022-02-08 DIAGNOSIS — K59.00 CONSTIPATION, UNSPECIFIED CONSTIPATION TYPE: Primary | ICD-10-CM

## 2022-02-08 LAB
ALBUMIN SERPL-MCNC: 3.2 G/DL (ref 3.4–5)
ALP LIVER SERPL-CCNC: 96 U/L
ALT SERPL-CCNC: 17 U/L
ANION GAP SERPL CALC-SCNC: 7 MMOL/L (ref 0–18)
AST SERPL-CCNC: 20 U/L (ref 15–37)
BASOPHILS # BLD AUTO: 0.05 X10(3) UL (ref 0–0.2)
BASOPHILS NFR BLD AUTO: 0.8 %
BILIRUB SERPL-MCNC: 0.4 MG/DL (ref 0.1–2)
BUN BLD-MCNC: 24 MG/DL (ref 7–18)
CALCIUM BLD-MCNC: 9.1 MG/DL (ref 8.5–10.1)
CEA SERPL-MCNC: 4.2 NG/ML (ref ?–5)
CHLORIDE SERPL-SCNC: 105 MMOL/L (ref 98–112)
CO2 SERPL-SCNC: 24 MMOL/L (ref 21–32)
CREAT BLD-MCNC: 1.04 MG/DL
EOSINOPHIL # BLD AUTO: 0.38 X10(3) UL (ref 0–0.7)
EOSINOPHIL NFR BLD AUTO: 5.9 %
ERYTHROCYTE [DISTWIDTH] IN BLOOD BY AUTOMATED COUNT: 14.4 %
FASTING STATUS PATIENT QL REPORTED: NO
GLOBULIN PLAS-MCNC: 4 G/DL (ref 2.8–4.4)
GLUCOSE BLD-MCNC: 114 MG/DL (ref 70–99)
HCT VFR BLD AUTO: 38.9 %
HGB BLD-MCNC: 12.2 G/DL
IMM GRANULOCYTES # BLD AUTO: 0.02 X10(3) UL (ref 0–1)
IMM GRANULOCYTES NFR BLD: 0.3 %
LDH SERPL L TO P-CCNC: 220 U/L
LYMPHOCYTES # BLD AUTO: 2.52 X10(3) UL (ref 1–4)
LYMPHOCYTES NFR BLD AUTO: 38.8 %
MCH RBC QN AUTO: 30.9 PG (ref 26–34)
MCHC RBC AUTO-ENTMCNC: 31.4 G/DL (ref 31–37)
MCV RBC AUTO: 98.5 FL
MONOCYTES # BLD AUTO: 0.32 X10(3) UL (ref 0.1–1)
MONOCYTES NFR BLD AUTO: 4.9 %
NEUTROPHILS # BLD AUTO: 3.2 X10 (3) UL (ref 1.5–7.7)
NEUTROPHILS # BLD AUTO: 3.2 X10(3) UL (ref 1.5–7.7)
NEUTROPHILS NFR BLD AUTO: 49.3 %
OSMOLALITY SERPL CALC.SUM OF ELEC: 287 MOSM/KG (ref 275–295)
PLATELET # BLD AUTO: 224 10(3)UL (ref 150–450)
POTASSIUM SERPL-SCNC: 4.3 MMOL/L (ref 3.5–5.1)
PROT SERPL-MCNC: 7.2 G/DL (ref 6.4–8.2)
RBC # BLD AUTO: 3.95 X10(6)UL
SODIUM SERPL-SCNC: 136 MMOL/L (ref 136–145)
WBC # BLD AUTO: 6.5 X10(3) UL (ref 4–11)

## 2022-02-08 PROCEDURE — 96413 CHEMO IV INFUSION 1 HR: CPT

## 2022-02-08 PROCEDURE — 96417 CHEMO IV INFUS EACH ADDL SEQ: CPT

## 2022-02-08 PROCEDURE — 99215 OFFICE O/P EST HI 40 MIN: CPT | Performed by: INTERNAL MEDICINE

## 2022-02-08 RX ORDER — LOSARTAN POTASSIUM 25 MG/1
25 TABLET ORAL DAILY
COMMUNITY
Start: 2022-02-03

## 2022-02-08 NOTE — PROGRESS NOTES
Pt here for C2D1. Arrives Ambulating independently, accompanied by Self           Pregnancy screening: Not applicable    Modifications in dose or schedule: No     Frequency of blood return and site check throughout administration: Prior to administration and At completion of therapy   Discharged to Home, Ambulating independently, accompanied by:Self    Outpatient Oncology Care Plan  Problem list:  knowledge deficit  Problems related to:    chemotherapy  Interventions:  emotional support given  maintain safe environment  patient/family supported  chemotherapy teaching  monitor effect of therapy  monitor lab values  promoted rest  provided general teaching  Expected outcomes:  understands plan of care  Progress towards outcome:  making progress    Education Record    Learner:  Patient  Barriers / Limitations:  None  Method:  Discussion  Outcome:  Shows understanding  Comments: Patient ambulatory with no complaints. Tolerated treatment today. AVS given. Patient discharged to home in stable condition.

## 2022-02-08 NOTE — PROGRESS NOTES
Pt here for 1 month MD f/u and due for treatment. Energy level is chronically low, appetite is fair. Denies pain. No bowel issues. Pt has intermittent swelling in hands/feet.      Outpatient Oncology Care Plan  Problem list:  fatigue  knowledge deficit    Problems related to:    disease/disease progression    Interventions:  provided general teaching    Expected outcomes:  safe in environment  symptoms relieved/minimized  understands plan of care    Progress towards outcome:  making progress    Education Record    Learner:  Patient  Barriers / Limitations:  None  Method:  Reinforcement  Outcome:  Shows understanding  Comments:

## 2022-02-22 ENCOUNTER — OFFICE VISIT (OUTPATIENT)
Dept: HEMATOLOGY/ONCOLOGY | Age: 77
End: 2022-02-22
Attending: INTERNAL MEDICINE
Payer: MEDICARE

## 2022-02-22 VITALS
TEMPERATURE: 96 F | DIASTOLIC BLOOD PRESSURE: 75 MMHG | SYSTOLIC BLOOD PRESSURE: 159 MMHG | HEIGHT: 69 IN | BODY MASS INDEX: 28.73 KG/M2 | WEIGHT: 194 LBS | HEART RATE: 85 BPM | OXYGEN SATURATION: 94 %

## 2022-02-22 DIAGNOSIS — C34.81 MALIGNANT NEOPLASM OF OVERLAPPING SITES OF RIGHT LUNG (HCC): Primary | ICD-10-CM

## 2022-02-22 LAB
ALBUMIN SERPL-MCNC: 3.6 G/DL (ref 3.4–5)
ALBUMIN/GLOB SERPL: 0.9 {RATIO} (ref 1–2)
ALP LIVER SERPL-CCNC: 97 U/L
ALT SERPL-CCNC: 17 U/L
ANION GAP SERPL CALC-SCNC: 8 MMOL/L (ref 0–18)
AST SERPL-CCNC: 17 U/L (ref 15–37)
BASOPHILS # BLD AUTO: 0.04 X10(3) UL (ref 0–0.2)
BASOPHILS NFR BLD AUTO: 0.6 %
BILIRUB SERPL-MCNC: 0.3 MG/DL (ref 0.1–2)
BUN BLD-MCNC: 31 MG/DL (ref 7–18)
CALCIUM BLD-MCNC: 9.7 MG/DL (ref 8.5–10.1)
CHLORIDE SERPL-SCNC: 102 MMOL/L (ref 98–112)
CO2 SERPL-SCNC: 25 MMOL/L (ref 21–32)
CREAT BLD-MCNC: 1.16 MG/DL
EOSINOPHIL # BLD AUTO: 0.41 X10(3) UL (ref 0–0.7)
EOSINOPHIL NFR BLD AUTO: 6.1 %
ERYTHROCYTE [DISTWIDTH] IN BLOOD BY AUTOMATED COUNT: 13.4 %
FASTING STATUS PATIENT QL REPORTED: NO
GLOBULIN PLAS-MCNC: 4 G/DL (ref 2.8–4.4)
GLUCOSE BLD-MCNC: 211 MG/DL (ref 70–99)
HCT VFR BLD AUTO: 37 %
HGB BLD-MCNC: 11.8 G/DL
IMM GRANULOCYTES # BLD AUTO: 0.02 X10(3) UL (ref 0–1)
IMM GRANULOCYTES NFR BLD: 0.3 %
LYMPHOCYTES # BLD AUTO: 2.57 X10(3) UL (ref 1–4)
LYMPHOCYTES NFR BLD AUTO: 38.1 %
MCH RBC QN AUTO: 29.8 PG (ref 26–34)
MCHC RBC AUTO-ENTMCNC: 31.9 G/DL (ref 31–37)
MCV RBC AUTO: 93.4 FL
MONOCYTES # BLD AUTO: 0.26 X10(3) UL (ref 0.1–1)
MONOCYTES NFR BLD AUTO: 3.9 %
NEUTROPHILS # BLD AUTO: 3.44 X10 (3) UL (ref 1.5–7.7)
NEUTROPHILS # BLD AUTO: 3.44 X10(3) UL (ref 1.5–7.7)
NEUTROPHILS NFR BLD AUTO: 51 %
OSMOLALITY SERPL CALC.SUM OF ELEC: 293 MOSM/KG (ref 275–295)
PLATELET # BLD AUTO: 245 10(3)UL (ref 150–450)
POTASSIUM SERPL-SCNC: 4.3 MMOL/L (ref 3.5–5.1)
PROT SERPL-MCNC: 7.6 G/DL (ref 6.4–8.2)
RBC # BLD AUTO: 3.96 X10(6)UL
SODIUM SERPL-SCNC: 135 MMOL/L (ref 136–145)
T4 FREE SERPL-MCNC: 1.2 NG/DL (ref 0.8–1.7)
TSI SER-ACNC: 3.44 MIU/ML (ref 0.36–3.74)
WBC # BLD AUTO: 6.7 X10(3) UL (ref 4–11)

## 2022-02-22 PROCEDURE — 84439 ASSAY OF FREE THYROXINE: CPT

## 2022-02-22 PROCEDURE — 84443 ASSAY THYROID STIM HORMONE: CPT

## 2022-02-22 PROCEDURE — 96413 CHEMO IV INFUSION 1 HR: CPT

## 2022-02-22 PROCEDURE — 36415 COLL VENOUS BLD VENIPUNCTURE: CPT

## 2022-02-22 PROCEDURE — 85025 COMPLETE CBC W/AUTO DIFF WBC: CPT

## 2022-02-22 PROCEDURE — 80053 COMPREHEN METABOLIC PANEL: CPT

## 2022-02-22 NOTE — PROGRESS NOTES
Pt here for C2D15. Arrives Ambulating independently, accompanied by Self           Pregnancy screening: Not applicable    Modifications in dose or schedule: No     Frequency of blood return and site check throughout administration: Prior to administration   Discharged to Home, Ambulating independently, accompanied by:Self    Outpatient Oncology Care Plan  Problem list:  knowledge deficit  Problems related to:    chemotherapy  Interventions:  chemotherapy teaching  Expected outcomes:  understands plan of care  Progress towards outcome:  making progress    Education Record    Learner:  Patient  Barriers / Limitations:  None  Method:  Brief focused  Outcome:  Shows understanding  Comments:  No complications.

## 2022-03-01 ENCOUNTER — OFFICE VISIT (OUTPATIENT)
Dept: HEMATOLOGY/ONCOLOGY | Age: 77
End: 2022-03-01
Attending: INTERNAL MEDICINE
Payer: MEDICARE

## 2022-03-01 VITALS
SYSTOLIC BLOOD PRESSURE: 148 MMHG | DIASTOLIC BLOOD PRESSURE: 79 MMHG | RESPIRATION RATE: 18 BRPM | OXYGEN SATURATION: 98 % | HEART RATE: 80 BPM | TEMPERATURE: 98 F

## 2022-03-01 DIAGNOSIS — C34.81 MALIGNANT NEOPLASM OF OVERLAPPING SITES OF RIGHT LUNG (HCC): Primary | ICD-10-CM

## 2022-03-08 ENCOUNTER — OFFICE VISIT (OUTPATIENT)
Dept: HEMATOLOGY/ONCOLOGY | Age: 77
End: 2022-03-08
Attending: INTERNAL MEDICINE
Payer: MEDICARE

## 2022-03-08 VITALS
HEIGHT: 69.02 IN | SYSTOLIC BLOOD PRESSURE: 180 MMHG | WEIGHT: 200 LBS | OXYGEN SATURATION: 98 % | BODY MASS INDEX: 29.62 KG/M2 | DIASTOLIC BLOOD PRESSURE: 77 MMHG | HEART RATE: 67 BPM | TEMPERATURE: 98 F

## 2022-03-08 DIAGNOSIS — C34.81 MALIGNANT NEOPLASM OF OVERLAPPING SITES OF RIGHT LUNG (HCC): Primary | ICD-10-CM

## 2022-03-08 LAB
ALBUMIN SERPL-MCNC: 3 G/DL (ref 3.4–5)
ALBUMIN/GLOB SERPL: 0.8 {RATIO} (ref 1–2)
ALP LIVER SERPL-CCNC: 83 U/L
ALT SERPL-CCNC: 17 U/L
ANION GAP SERPL CALC-SCNC: 5 MMOL/L (ref 0–18)
AST SERPL-CCNC: 17 U/L (ref 15–37)
BASOPHILS # BLD AUTO: 0.02 X10(3) UL (ref 0–0.2)
BASOPHILS NFR BLD AUTO: 0.3 %
BILIRUB SERPL-MCNC: 0.3 MG/DL (ref 0.1–2)
BUN BLD-MCNC: 31 MG/DL (ref 7–18)
CHLORIDE SERPL-SCNC: 105 MMOL/L (ref 98–112)
CO2 SERPL-SCNC: 26 MMOL/L (ref 21–32)
CREAT BLD-MCNC: 1.11 MG/DL
EOSINOPHIL # BLD AUTO: 0.38 X10(3) UL (ref 0–0.7)
EOSINOPHIL NFR BLD AUTO: 5.2 %
ERYTHROCYTE [DISTWIDTH] IN BLOOD BY AUTOMATED COUNT: 13.2 %
FASTING STATUS PATIENT QL REPORTED: NO
GLOBULIN PLAS-MCNC: 4 G/DL (ref 2.8–4.4)
GLUCOSE BLD-MCNC: 159 MG/DL (ref 70–99)
HCT VFR BLD AUTO: 35.3 %
HGB BLD-MCNC: 11.4 G/DL
IMM GRANULOCYTES # BLD AUTO: 0.01 X10(3) UL (ref 0–1)
IMM GRANULOCYTES NFR BLD: 0.1 %
LYMPHOCYTES # BLD AUTO: 2.87 X10(3) UL (ref 1–4)
LYMPHOCYTES NFR BLD AUTO: 39.5 %
MCH RBC QN AUTO: 29.6 PG (ref 26–34)
MCHC RBC AUTO-ENTMCNC: 32.3 G/DL (ref 31–37)
MCV RBC AUTO: 91.7 FL
MONOCYTES # BLD AUTO: 0.41 X10(3) UL (ref 0.1–1)
MONOCYTES NFR BLD AUTO: 5.6 %
NEUTROPHILS # BLD AUTO: 3.58 X10 (3) UL (ref 1.5–7.7)
NEUTROPHILS # BLD AUTO: 3.58 X10(3) UL (ref 1.5–7.7)
NEUTROPHILS NFR BLD AUTO: 49.3 %
OSMOLALITY SERPL CALC.SUM OF ELEC: 292 MOSM/KG (ref 275–295)
PLATELET # BLD AUTO: 256 10(3)UL (ref 150–450)
POTASSIUM SERPL-SCNC: 4.5 MMOL/L (ref 3.5–5.1)
PROT SERPL-MCNC: 7 G/DL (ref 6.4–8.2)
RBC # BLD AUTO: 3.85 X10(6)UL
SODIUM SERPL-SCNC: 136 MMOL/L (ref 136–145)
WBC # BLD AUTO: 7.3 X10(3) UL (ref 4–11)

## 2022-03-08 PROCEDURE — 85025 COMPLETE CBC W/AUTO DIFF WBC: CPT

## 2022-03-08 PROCEDURE — 96413 CHEMO IV INFUSION 1 HR: CPT

## 2022-03-08 PROCEDURE — 80053 COMPREHEN METABOLIC PANEL: CPT

## 2022-03-08 PROCEDURE — 36415 COLL VENOUS BLD VENIPUNCTURE: CPT

## 2022-03-08 NOTE — PROGRESS NOTES
Pt here for C2D29. Arrives Ambulating independently, accompanied by Self           Pregnancy screening: Not applicable    Modifications in dose or schedule: No     Frequency of blood return and site check throughout administration: Prior to administration and At completion of therapy   Discharged to Home, Ambulating independently, accompanied by:Self    Outpatient Oncology Care Plan  Problem list:  knowledge deficit  Problems related to:    chemotherapy  Interventions:  emotional support given  maintain safe environment  patient/family supported  chemotherapy teaching  maintain skin integrity  monitor effect of therapy  monitor lab values  promoted rest  provided general teaching  Expected outcomes:  understands plan of care  Progress towards outcome:  making progress    Education Record    Learner:  Patient  Barriers / Limitations:  None  Method:  Discussion  Outcome:  Shows understanding  Comments: Patient ambulatory with no complaints. Tolerated treatment today. Discharged home in stable condition.

## 2022-03-09 ENCOUNTER — HOSPITAL ENCOUNTER (OUTPATIENT)
Dept: CT IMAGING | Facility: HOSPITAL | Age: 77
Discharge: HOME | End: 2022-03-09
Attending: INTERNAL MEDICINE
Payer: MEDICARE

## 2022-03-09 DIAGNOSIS — C34.81 MALIGNANT NEOPLASM OF OVERLAPPING SITES OF RIGHT LUNG (HCC): ICD-10-CM

## 2022-03-09 PROCEDURE — 74177 CT ABD & PELVIS W/CONTRAST: CPT | Performed by: INTERNAL MEDICINE

## 2022-03-09 PROCEDURE — 71260 CT THORAX DX C+: CPT | Performed by: INTERNAL MEDICINE

## 2022-03-09 RX ORDER — IOHEXOL 350 MG/ML
100 INJECTION, SOLUTION INTRAVENOUS
Status: COMPLETED | OUTPATIENT
Start: 2022-03-09 | End: 2022-03-09

## 2022-03-09 RX ADMIN — IOHEXOL 100 ML: 350 INJECTION, SOLUTION INTRAVENOUS at 15:55:00

## 2022-03-15 ENCOUNTER — HOSPITAL ENCOUNTER (OUTPATIENT)
Dept: NUCLEAR MEDICINE | Facility: HOSPITAL | Age: 77
Discharge: HOME | End: 2022-03-15
Attending: INTERNAL MEDICINE
Payer: MEDICARE

## 2022-03-15 DIAGNOSIS — C34.81 MALIGNANT NEOPLASM OF OVERLAPPING SITES OF RIGHT LUNG (HCC): ICD-10-CM

## 2022-03-15 LAB — GLUCOSE BLD-MCNC: 102 MG/DL (ref 70–99)

## 2022-03-15 PROCEDURE — 82962 GLUCOSE BLOOD TEST: CPT

## 2022-03-15 PROCEDURE — 78815 PET IMAGE W/CT SKULL-THIGH: CPT | Performed by: INTERNAL MEDICINE

## 2022-03-16 ENCOUNTER — TELEPHONE (OUTPATIENT)
Dept: HEMATOLOGY/ONCOLOGY | Facility: HOSPITAL | Age: 77
End: 2022-03-16

## 2022-03-16 RX ORDER — HYDROCODONE BITARTRATE AND ACETAMINOPHEN 5; 325 MG/1; MG/1
1-2 TABLET ORAL EVERY 8 HOURS PRN
Qty: 60 TABLET | Refills: 0 | Status: SHIPPED | OUTPATIENT
Start: 2022-03-16 | End: 2022-03-17

## 2022-03-16 NOTE — TELEPHONE ENCOUNTER
Ask him what part of this back- cervical, thoracic or lumbar. Then order MRI with and w/o of the area. I will order hydrocodone. Tell him to make sure he takes something for his bowels     Pt notes pain in left mid back, MRI thoracic ordered. Pt instructed on pain meds.

## 2022-03-16 NOTE — TELEPHONE ENCOUNTER
Called pt back, rec'd Dr. Louis Pour PET scan results. Pt notes over the last couple weeks he is having a significant increase in back pain that he has had for the past few months. He has taken Tylenol or Aleve with no relief. Making it hard for him to breathe, hard to lay on his side and makes him sleep upright in the recliner. Message forwarded to Dr. Paula Slater to advise.

## 2022-03-16 NOTE — TELEPHONE ENCOUNTER
Patient called and said he is still in a great amount of pain. He does have an appointment on 3/22/22 for a follow up and doesn't know if you would like him to wait until then to see you. Please call back when able. Thank you.

## 2022-03-17 RX ORDER — HYDROCODONE BITARTRATE AND ACETAMINOPHEN 5; 325 MG/1; MG/1
1-2 TABLET ORAL EVERY 8 HOURS PRN
Qty: 60 TABLET | Refills: 0 | Status: SHIPPED | OUTPATIENT
Start: 2022-03-17 | End: 2022-03-22 | Stop reason: ALTCHOICE

## 2022-03-17 NOTE — TELEPHONE ENCOUNTER
Patient received a phone call from [facility] stating all stores in the area did not have inventory of Hydrocodone 5-325 mg tabs so he would like it filled at [address] [place] [address] instead

## 2022-03-17 NOTE — TELEPHONE ENCOUNTER
Patient called to have hydrocodone prescription sent to [facility] in [address] [place] [address]. Not [facility] or [facility].  Thank you.

## 2022-03-18 ENCOUNTER — HOSPITAL ENCOUNTER (OUTPATIENT)
Dept: MRI IMAGING | Age: 77
Discharge: HOME | End: 2022-03-18
Attending: INTERNAL MEDICINE
Payer: MEDICARE

## 2022-03-18 DIAGNOSIS — C34.81 MALIGNANT NEOPLASM OF OVERLAPPING SITES OF RIGHT LUNG (HCC): ICD-10-CM

## 2022-03-18 DIAGNOSIS — M54.9 BACK PAIN: ICD-10-CM

## 2022-03-18 PROCEDURE — 72146 MRI CHEST SPINE W/O DYE: CPT | Performed by: INTERNAL MEDICINE

## 2022-03-22 ENCOUNTER — OFFICE VISIT (OUTPATIENT)
Dept: HEMATOLOGY/ONCOLOGY | Age: 77
End: 2022-03-22
Attending: INTERNAL MEDICINE
Payer: MEDICARE

## 2022-03-22 ENCOUNTER — ORDER TRANSCRIPTION (OUTPATIENT)
Dept: ADMINISTRATIVE | Facility: HOSPITAL | Age: 77
End: 2022-03-22

## 2022-03-22 ENCOUNTER — HOSPITAL ENCOUNTER (OUTPATIENT)
Dept: ULTRASOUND IMAGING | Age: 77
Discharge: HOME | End: 2022-03-22
Attending: INTERNAL MEDICINE
Payer: MEDICARE

## 2022-03-22 VITALS
SYSTOLIC BLOOD PRESSURE: 143 MMHG | HEIGHT: 69.02 IN | DIASTOLIC BLOOD PRESSURE: 71 MMHG | TEMPERATURE: 98 F | OXYGEN SATURATION: 97 % | WEIGHT: 194.13 LBS | BODY MASS INDEX: 28.75 KG/M2 | HEART RATE: 72 BPM | RESPIRATION RATE: 18 BRPM

## 2022-03-22 DIAGNOSIS — R59.9 ADENOPATHY: ICD-10-CM

## 2022-03-22 DIAGNOSIS — M79.89 LEG SWELLING: ICD-10-CM

## 2022-03-22 DIAGNOSIS — C78.7 LIVER METASTASES (HCC): ICD-10-CM

## 2022-03-22 DIAGNOSIS — C34.81 MALIGNANT NEOPLASM OF OVERLAPPING SITES OF RIGHT LUNG (HCC): ICD-10-CM

## 2022-03-22 DIAGNOSIS — I26.99 PE (PULMONARY THROMBOEMBOLISM) (HCC): ICD-10-CM

## 2022-03-22 DIAGNOSIS — Z86.711 HISTORY OF PULMONARY EMBOLISM: ICD-10-CM

## 2022-03-22 DIAGNOSIS — M54.6 ACUTE MIDLINE THORACIC BACK PAIN: ICD-10-CM

## 2022-03-22 DIAGNOSIS — D72.820 MONOCLONAL B-CELL LYMPHOCYTOSIS: ICD-10-CM

## 2022-03-22 DIAGNOSIS — G89.3 NEOPLASM RELATED PAIN: Primary | ICD-10-CM

## 2022-03-22 DIAGNOSIS — C34.81 MALIGNANT NEOPLASM OF OVERLAPPING SITES OF RIGHT LUNG (HCC): Primary | ICD-10-CM

## 2022-03-22 DIAGNOSIS — E87.1 HYPONATREMIA: ICD-10-CM

## 2022-03-22 DIAGNOSIS — T88.7XXA MEDICATION SIDE EFFECTS: ICD-10-CM

## 2022-03-22 PROBLEM — Z51.5 PALLIATIVE CARE BY SPECIALIST: Status: ACTIVE | Noted: 2022-01-01

## 2022-03-22 PROBLEM — Z51.5 PALLIATIVE CARE BY SPECIALIST: Status: ACTIVE | Noted: 2022-03-22

## 2022-03-22 LAB
ALBUMIN SERPL-MCNC: 3 G/DL (ref 3.4–5)
ALBUMIN/GLOB SERPL: 0.7 {RATIO} (ref 1–2)
ALP LIVER SERPL-CCNC: 86 U/L
ALT SERPL-CCNC: 15 U/L
ANION GAP SERPL CALC-SCNC: 8 MMOL/L (ref 0–18)
AST SERPL-CCNC: 16 U/L (ref 15–37)
BASOPHILS # BLD AUTO: 0.05 X10(3) UL (ref 0–0.2)
BASOPHILS NFR BLD AUTO: 0.7 %
BILIRUB SERPL-MCNC: 0.3 MG/DL (ref 0.1–2)
BUN BLD-MCNC: 36 MG/DL (ref 7–18)
CALCIUM BLD-MCNC: 8.8 MG/DL (ref 8.5–10.1)
CEA SERPL-MCNC: 4.3 NG/ML (ref ?–5)
CHLORIDE SERPL-SCNC: 101 MMOL/L (ref 98–112)
CO2 SERPL-SCNC: 25 MMOL/L (ref 21–32)
CREAT BLD-MCNC: 1.26 MG/DL
EOSINOPHIL # BLD AUTO: 0.31 X10(3) UL (ref 0–0.7)
EOSINOPHIL NFR BLD AUTO: 4.1 %
ERYTHROCYTE [DISTWIDTH] IN BLOOD BY AUTOMATED COUNT: 13.2 %
FASTING STATUS PATIENT QL REPORTED: NO
GLOBULIN PLAS-MCNC: 4.1 G/DL (ref 2.8–4.4)
GLUCOSE BLD-MCNC: 170 MG/DL (ref 70–99)
HCT VFR BLD AUTO: 35.3 %
HGB BLD-MCNC: 11.3 G/DL
IMM GRANULOCYTES # BLD AUTO: 0.01 X10(3) UL (ref 0–1)
IMM GRANULOCYTES NFR BLD: 0.1 %
LDH SERPL L TO P-CCNC: 189 U/L
LYMPHOCYTES # BLD AUTO: 2.62 X10(3) UL (ref 1–4)
LYMPHOCYTES NFR BLD AUTO: 34.7 %
MCH RBC QN AUTO: 28.8 PG (ref 26–34)
MCHC RBC AUTO-ENTMCNC: 32 G/DL (ref 31–37)
MCV RBC AUTO: 89.8 FL
MONOCYTES # BLD AUTO: 0.28 X10(3) UL (ref 0.1–1)
MONOCYTES NFR BLD AUTO: 3.7 %
NEUTROPHILS # BLD AUTO: 4.29 X10 (3) UL (ref 1.5–7.7)
NEUTROPHILS # BLD AUTO: 4.29 X10(3) UL (ref 1.5–7.7)
NEUTROPHILS NFR BLD AUTO: 56.7 %
OSMOLALITY SERPL CALC.SUM OF ELEC: 290 MOSM/KG (ref 275–295)
PLATELET # BLD AUTO: 290 10(3)UL (ref 150–450)
POTASSIUM SERPL-SCNC: 4.4 MMOL/L (ref 3.5–5.1)
PROT SERPL-MCNC: 7.1 G/DL (ref 6.4–8.2)
RBC # BLD AUTO: 3.93 X10(6)UL
SODIUM SERPL-SCNC: 134 MMOL/L (ref 136–145)
T4 FREE SERPL-MCNC: 1.2 NG/DL (ref 0.8–1.7)
TSI SER-ACNC: 3.4 MIU/ML (ref 0.36–3.74)
WBC # BLD AUTO: 7.6 X10(3) UL (ref 4–11)

## 2022-03-22 PROCEDURE — 99215 OFFICE O/P EST HI 40 MIN: CPT | Performed by: INTERNAL MEDICINE

## 2022-03-22 PROCEDURE — 93970 EXTREMITY STUDY: CPT | Performed by: INTERNAL MEDICINE

## 2022-03-22 PROCEDURE — 96375 TX/PRO/DX INJ NEW DRUG ADDON: CPT

## 2022-03-22 PROCEDURE — 99213 OFFICE O/P EST LOW 20 MIN: CPT | Performed by: NURSE PRACTITIONER

## 2022-03-22 PROCEDURE — 96413 CHEMO IV INFUSION 1 HR: CPT

## 2022-03-22 RX ORDER — ONDANSETRON HYDROCHLORIDE 8 MG/1
8 TABLET, FILM COATED ORAL EVERY 8 HOURS PRN
Qty: 30 TABLET | Refills: 3 | Status: SHIPPED | OUTPATIENT
Start: 2022-03-22

## 2022-03-22 RX ORDER — OXYCODONE HYDROCHLORIDE 5 MG/1
5 TABLET ORAL 2 TIMES DAILY PRN
Qty: 30 TABLET | Refills: 0 | Status: SHIPPED | OUTPATIENT
Start: 2022-03-22

## 2022-03-22 RX ORDER — PROCHLORPERAZINE MALEATE 10 MG
10 TABLET ORAL EVERY 6 HOURS PRN
Qty: 30 TABLET | Refills: 3 | Status: SHIPPED | OUTPATIENT
Start: 2022-03-22

## 2022-03-22 NOTE — PROGRESS NOTES
Pt here for 6 week MD f/u and due for treatment. Energy level is much lower than previous. Appetite is fair, doesn't want to eat much, pushes himself. Pt has continued pain in back, takes Norco 2 tabs at night, 1 tab not helpful. Pt has BLE swelling, Akira Garcia put him on lasix and has recommended an US.      Outpatient Oncology Care Plan  Problem list:  fatigue  knowledge deficit    Problems related to:    disease/disease progression    Interventions:  provided general teaching    Expected outcomes:  safe in environment  symptoms relieved/minimized  understands plan of care    Progress towards outcome:  making progress    Education Record    Learner:  Patient  Barriers / Limitations:  None  Method:  Reinforcement  Outcome:  Shows understanding  Comments:

## 2022-03-22 NOTE — PROGRESS NOTES
Pt here for C1D1. Arrives Ambulating independently, accompanied by Self           Pregnancy screening: Not applicable    Modifications in dose or schedule: No     Frequency of blood return and site check throughout administration: Prior to administration and At completion of therapy   Discharged to Home, Ambulating independently, accompanied by:Self    Outpatient Oncology Care Plan  Problem list:  knowledge deficit  Problems related to:    chemotherapy  Interventions:  emotional support given  maintain safe environment  chemotherapy teaching  monitor effect of therapy  monitor lab values  promoted rest  provided general teaching  Expected outcomes:  understands plan of care  Progress towards outcome:  making progress    Education Record    Learner:  Patient  Barriers / Limitations:  None  Method:  Discussion  Outcome:  Shows understanding  Comments: patient ambulatory with no complaints. Tolerated treatment.

## 2022-03-23 ENCOUNTER — TELEPHONE (OUTPATIENT)
Dept: HEMATOLOGY/ONCOLOGY | Facility: HOSPITAL | Age: 77
End: 2022-03-23

## 2022-03-23 RX ORDER — NICOTINE POLACRILEX 4 MG
15 LOZENGE BUCCAL
OUTPATIENT
Start: 2022-03-23

## 2022-03-23 RX ORDER — DEXTROSE MONOHYDRATE 25 G/50ML
50 INJECTION, SOLUTION INTRAVENOUS
OUTPATIENT
Start: 2022-03-23

## 2022-03-23 RX ORDER — FUROSEMIDE 20 MG/1
20 TABLET ORAL 2 TIMES DAILY
COMMUNITY

## 2022-03-23 RX ORDER — SODIUM CHLORIDE 9 MG/ML
INJECTION, SOLUTION INTRAVENOUS CONTINUOUS
OUTPATIENT
Start: 2022-03-23

## 2022-03-23 RX ORDER — NICOTINE POLACRILEX 4 MG
30 LOZENGE BUCCAL
OUTPATIENT
Start: 2022-03-23

## 2022-03-23 NOTE — TELEPHONE ENCOUNTER
Toxicities: C1 D1 Gemcitabine on 3/22/2022    I attempted to reach Filiberto Hamman to see how he is feeling after his first treatment. I left a voice mail message asking him to please return my call at his earliest convenience.

## 2022-03-23 NOTE — TELEPHONE ENCOUNTER
Corrinne Mitts said he is feeling well. No side effects at this time. He also asked me to let Dr Cricket Peter know that the oxycodone she prescribed worked very well. He slept well and did not have pain. I encouraged Corrinne Mitts to please call the office if he is not feeling well or has any questions or concerns. He agreed.

## 2022-03-25 ENCOUNTER — TELEPHONE (OUTPATIENT)
Dept: HEMATOLOGY/ONCOLOGY | Age: 77
End: 2022-03-25

## 2022-03-25 ENCOUNTER — ANESTHESIA EVENT (OUTPATIENT)
Dept: MRI IMAGING | Facility: HOSPITAL | Age: 77
End: 2022-03-25
Payer: MEDICARE

## 2022-03-25 ENCOUNTER — LAB ENCOUNTER (OUTPATIENT)
Dept: LAB | Age: 77
End: 2022-03-25
Attending: INTERNAL MEDICINE
Payer: MEDICARE

## 2022-03-25 ENCOUNTER — EKG ENCOUNTER (OUTPATIENT)
Dept: LAB | Facility: HOSPITAL | Age: 77
End: 2022-03-25
Attending: INTERNAL MEDICINE
Payer: MEDICARE

## 2022-03-25 ENCOUNTER — EKG ENCOUNTER (OUTPATIENT)
Dept: LAB | Age: 77
End: 2022-03-25
Payer: MEDICARE

## 2022-03-25 DIAGNOSIS — Z01.818 PRE-OP TESTING: ICD-10-CM

## 2022-03-25 DIAGNOSIS — C34.81 MALIGNANT NEOPLASM OF OVERLAPPING SITES OF RIGHT LUNG (HCC): ICD-10-CM

## 2022-03-25 DIAGNOSIS — C34.81 MALIGNANT NEOPLASM OF OVERLAPPING SITES OF RIGHT LUNG (HCC): Primary | ICD-10-CM

## 2022-03-25 DIAGNOSIS — Z11.59 SCREENING EXAMINATION FOR ARTHROPOD-BORNE VIRAL DISEASE: ICD-10-CM

## 2022-03-25 PROCEDURE — 93005 ELECTROCARDIOGRAM TRACING: CPT

## 2022-03-25 PROCEDURE — 93010 ELECTROCARDIOGRAM REPORT: CPT | Performed by: INTERNAL MEDICINE

## 2022-03-25 NOTE — TELEPHONE ENCOUNTER
Maikel Denney from [employer]. Stating she is assisting patient for MRI on Monday 3/28. Requesting that Dr Mclean Beams place History of Physical in system  Or get it to the Patient.   And to please call the patient to advise

## 2022-03-25 NOTE — PAT NURSING NOTE
Unconfirmed Ekg reviewed by /anesthesia. Spoke w him to verify his response. He states that he would like last office note from cardiologist/ to be present in pt record for Mri on Monday. Spoke w [facility]-Verified pt last appt lesvia HOFFMANN was 2/2022. They will fax note asap.

## 2022-03-26 LAB
ATRIAL RATE: 94 BPM
P AXIS: 77 DEGREES
P-R INTERVAL: 158 MS
Q-T INTERVAL: 318 MS
QRS DURATION: 102 MS
QTC CALCULATION (BEZET): 397 MS
R AXIS: -65 DEGREES
SARS-COV-2 RNA RESP QL NAA+PROBE: NOT DETECTED
VENTRICULAR RATE: 94 BPM

## 2022-03-28 ENCOUNTER — ANESTHESIA (OUTPATIENT)
Dept: MRI IMAGING | Facility: HOSPITAL | Age: 77
End: 2022-03-28
Payer: MEDICARE

## 2022-03-28 ENCOUNTER — HOSPITAL ENCOUNTER (OUTPATIENT)
Dept: MRI IMAGING | Facility: HOSPITAL | Age: 77
Discharge: HOME | End: 2022-03-28
Attending: INTERNAL MEDICINE
Payer: MEDICARE

## 2022-03-28 VITALS
RESPIRATION RATE: 18 BRPM | OXYGEN SATURATION: 98 % | DIASTOLIC BLOOD PRESSURE: 60 MMHG | WEIGHT: 194 LBS | BODY MASS INDEX: 28.73 KG/M2 | TEMPERATURE: 97 F | HEART RATE: 61 BPM | SYSTOLIC BLOOD PRESSURE: 124 MMHG | HEIGHT: 69 IN

## 2022-03-28 DIAGNOSIS — C34.81 MALIGNANT NEOPLASM OF OVERLAPPING SITES OF RIGHT LUNG (HCC): Primary | ICD-10-CM

## 2022-03-28 DIAGNOSIS — M54.6 ACUTE MIDLINE THORACIC BACK PAIN: ICD-10-CM

## 2022-03-28 LAB
GLUCOSE BLD-MCNC: 128 MG/DL (ref 70–99)
GLUCOSE BLD-MCNC: 141 MG/DL (ref 70–99)

## 2022-03-28 PROCEDURE — A9575 INJ GADOTERATE MEGLUMI 0.1ML: HCPCS | Performed by: INTERNAL MEDICINE

## 2022-03-28 PROCEDURE — 72157 MRI CHEST SPINE W/O & W/DYE: CPT | Performed by: INTERNAL MEDICINE

## 2022-03-28 PROCEDURE — 82962 GLUCOSE BLOOD TEST: CPT

## 2022-03-28 RX ORDER — NALOXONE HYDROCHLORIDE 0.4 MG/ML
80 INJECTION, SOLUTION INTRAMUSCULAR; INTRAVENOUS; SUBCUTANEOUS AS NEEDED
Status: ACTIVE | OUTPATIENT
Start: 2022-03-28 | End: 2022-03-28

## 2022-03-28 RX ORDER — HYDROMORPHONE HYDROCHLORIDE 1 MG/ML
0.4 INJECTION, SOLUTION INTRAMUSCULAR; INTRAVENOUS; SUBCUTANEOUS EVERY 5 MIN PRN
Status: ACTIVE | OUTPATIENT
Start: 2022-03-28 | End: 2022-03-28

## 2022-03-28 RX ORDER — MEPERIDINE HYDROCHLORIDE 25 MG/ML
12.5 INJECTION INTRAMUSCULAR; INTRAVENOUS; SUBCUTANEOUS AS NEEDED
Status: DISCONTINUED | OUTPATIENT
Start: 2022-03-28 | End: 2022-03-30

## 2022-03-28 RX ORDER — DEXTROSE MONOHYDRATE 25 G/50ML
50 INJECTION, SOLUTION INTRAVENOUS
Status: DISCONTINUED | OUTPATIENT
Start: 2022-03-28 | End: 2022-03-30

## 2022-03-28 RX ORDER — SODIUM CHLORIDE, SODIUM LACTATE, POTASSIUM CHLORIDE, CALCIUM CHLORIDE 600; 310; 30; 20 MG/100ML; MG/100ML; MG/100ML; MG/100ML
INJECTION, SOLUTION INTRAVENOUS CONTINUOUS
Status: DISCONTINUED | OUTPATIENT
Start: 2022-03-28 | End: 2022-03-30

## 2022-03-28 RX ORDER — INSULIN ASPART 100 [IU]/ML
INJECTION, SOLUTION INTRAVENOUS; SUBCUTANEOUS ONCE
Status: DISCONTINUED | OUTPATIENT
Start: 2022-03-28 | End: 2022-03-30

## 2022-03-28 RX ORDER — DIPHENHYDRAMINE HYDROCHLORIDE 50 MG/ML
12.5 INJECTION INTRAMUSCULAR; INTRAVENOUS AS NEEDED
Status: ACTIVE | OUTPATIENT
Start: 2022-03-28 | End: 2022-03-28

## 2022-03-28 RX ORDER — NICOTINE POLACRILEX 4 MG
30 LOZENGE BUCCAL
Status: DISCONTINUED | OUTPATIENT
Start: 2022-03-28 | End: 2022-03-30

## 2022-03-28 RX ORDER — NICOTINE POLACRILEX 4 MG
15 LOZENGE BUCCAL
Status: DISCONTINUED | OUTPATIENT
Start: 2022-03-28 | End: 2022-03-30

## 2022-03-28 RX ORDER — LIDOCAINE HYDROCHLORIDE 10 MG/ML
INJECTION, SOLUTION EPIDURAL; INFILTRATION; INTRACAUDAL; PERINEURAL AS NEEDED
Status: DISCONTINUED | OUTPATIENT
Start: 2022-03-28 | End: 2022-03-28 | Stop reason: SURG

## 2022-03-28 RX ORDER — SODIUM CHLORIDE, SODIUM LACTATE, POTASSIUM CHLORIDE, CALCIUM CHLORIDE 600; 310; 30; 20 MG/100ML; MG/100ML; MG/100ML; MG/100ML
INJECTION, SOLUTION INTRAVENOUS CONTINUOUS PRN
Status: DISCONTINUED | OUTPATIENT
Start: 2022-03-28 | End: 2022-03-28

## 2022-03-28 RX ADMIN — LIDOCAINE HYDROCHLORIDE 50 MG: 10 INJECTION, SOLUTION EPIDURAL; INFILTRATION; INTRACAUDAL; PERINEURAL at 14:17:00

## 2022-03-28 RX ADMIN — SODIUM CHLORIDE, SODIUM LACTATE, POTASSIUM CHLORIDE, CALCIUM CHLORIDE: 600; 310; 30; 20 INJECTION, SOLUTION INTRAVENOUS at 14:10:00

## 2022-03-28 NOTE — ANESTHESIA PROCEDURE NOTES
Airway  Date/Time: 3/28/2022 2:20 PM  Urgency: elective      General Information and Staff    Patient location during procedure: OR  Anesthesiologist: Irasema Diehl MD  Performed: anesthesiologist     Indications and Patient Condition  Indications for airway management: anesthesia  Sedation level: deep  Preoxygenated: yes  Patient position: sniffing  Mask difficulty assessment: 1 - vent by mask    Final Airway Details  Final airway type: supraglottic airway      Successful airway: classic  Size 4      Number of attempts at approach: 1

## 2022-03-29 ENCOUNTER — OFFICE VISIT (OUTPATIENT)
Dept: HEMATOLOGY/ONCOLOGY | Age: 77
End: 2022-03-29
Attending: INTERNAL MEDICINE
Payer: MEDICARE

## 2022-03-29 VITALS
HEIGHT: 69.02 IN | SYSTOLIC BLOOD PRESSURE: 133 MMHG | WEIGHT: 197 LBS | TEMPERATURE: 98 F | DIASTOLIC BLOOD PRESSURE: 82 MMHG | OXYGEN SATURATION: 97 % | HEART RATE: 76 BPM | BODY MASS INDEX: 29.18 KG/M2

## 2022-03-29 DIAGNOSIS — R05.9 COUGH: ICD-10-CM

## 2022-03-29 DIAGNOSIS — R68.83 CHILLS: ICD-10-CM

## 2022-03-29 DIAGNOSIS — M79.89 LEG SWELLING: ICD-10-CM

## 2022-03-29 DIAGNOSIS — F32.9 REACTIVE DEPRESSION: ICD-10-CM

## 2022-03-29 DIAGNOSIS — Z51.11 ENCOUNTER FOR CHEMOTHERAPY MANAGEMENT: Primary | ICD-10-CM

## 2022-03-29 DIAGNOSIS — C34.81 MALIGNANT NEOPLASM OF OVERLAPPING SITES OF RIGHT LUNG (HCC): Primary | ICD-10-CM

## 2022-03-29 DIAGNOSIS — C34.81 MALIGNANT NEOPLASM OF OVERLAPPING SITES OF RIGHT LUNG (HCC): ICD-10-CM

## 2022-03-29 DIAGNOSIS — C78.7 LIVER METASTASES (HCC): ICD-10-CM

## 2022-03-29 LAB
BASOPHILS # BLD AUTO: 0.02 X10(3) UL (ref 0–0.2)
BASOPHILS NFR BLD AUTO: 0.3 %
EOSINOPHIL # BLD AUTO: 0.02 X10(3) UL (ref 0–0.7)
EOSINOPHIL NFR BLD AUTO: 0.3 %
ERYTHROCYTE [DISTWIDTH] IN BLOOD BY AUTOMATED COUNT: 13.2 %
HCT VFR BLD AUTO: 33.8 %
HGB BLD-MCNC: 10.7 G/DL
IMM GRANULOCYTES # BLD AUTO: 0.01 X10(3) UL (ref 0–1)
IMM GRANULOCYTES NFR BLD: 0.2 %
LYMPHOCYTES # BLD AUTO: 2.54 X10(3) UL (ref 1–4)
LYMPHOCYTES NFR BLD AUTO: 43.8 %
MCH RBC QN AUTO: 28 PG (ref 26–34)
MCHC RBC AUTO-ENTMCNC: 31.7 G/DL (ref 31–37)
MCV RBC AUTO: 88.5 FL
MONOCYTES # BLD AUTO: 0.22 X10(3) UL (ref 0.1–1)
MONOCYTES NFR BLD AUTO: 3.8 %
NEUTROPHILS # BLD AUTO: 2.99 X10 (3) UL (ref 1.5–7.7)
NEUTROPHILS # BLD AUTO: 2.99 X10(3) UL (ref 1.5–7.7)
NEUTROPHILS NFR BLD AUTO: 51.6 %
PLATELET # BLD AUTO: 156 10(3)UL (ref 150–450)
RBC # BLD AUTO: 3.82 X10(6)UL
WBC # BLD AUTO: 5.8 X10(3) UL (ref 4–11)

## 2022-03-29 PROCEDURE — 99215 OFFICE O/P EST HI 40 MIN: CPT | Performed by: CLINICAL NURSE SPECIALIST

## 2022-03-29 RX ORDER — CITALOPRAM 20 MG/1
20 TABLET ORAL DAILY
Qty: 30 TABLET | Refills: 3 | Status: SHIPPED | OUTPATIENT
Start: 2022-03-29

## 2022-03-29 RX ORDER — AMOXICILLIN AND CLAVULANATE POTASSIUM 875; 125 MG/1; MG/1
1 TABLET, FILM COATED ORAL 2 TIMES DAILY
Qty: 20 TABLET | Refills: 0 | Status: SHIPPED | OUTPATIENT
Start: 2022-03-29

## 2022-03-29 NOTE — PROGRESS NOTES
Education Record    Learner:  Patient/ family member    Disease / Diagnosis: lung ca  OTV D8C1 Gemzar    Barriers / Limitations:  None   Comments:    Method:  Discussion   Comments:    General Topics:  Plan of care reviewed   Comments:    Outcome:  Shows understanding   Comments:  Tough week. Last Thursday noted feeling poorly. Reports chills, increased swelling to hands and feet. Some HA, loss of appetite,  constipation. Denies N/V   Taking dulcolax at HS, and miralax in the am, not helping enough. Taking ox[facility]codone at [facility] for back pain, that helps. MRI yesterday with anesthesia, wiped him out.

## 2022-04-05 ENCOUNTER — OFFICE VISIT (OUTPATIENT)
Dept: HEMATOLOGY/ONCOLOGY | Age: 77
End: 2022-04-05
Attending: INTERNAL MEDICINE
Payer: MEDICARE

## 2022-04-05 ENCOUNTER — APPOINTMENT (OUTPATIENT)
Dept: HEMATOLOGY/ONCOLOGY | Age: 77
End: 2022-04-05
Attending: INTERNAL MEDICINE
Payer: MEDICARE

## 2022-04-05 VITALS
BODY MASS INDEX: 28.08 KG/M2 | WEIGHT: 189.63 LBS | HEIGHT: 69.02 IN | HEART RATE: 59 BPM | SYSTOLIC BLOOD PRESSURE: 158 MMHG | TEMPERATURE: 96 F | DIASTOLIC BLOOD PRESSURE: 80 MMHG | OXYGEN SATURATION: 99 %

## 2022-04-05 DIAGNOSIS — R05.9 COUGH: ICD-10-CM

## 2022-04-05 DIAGNOSIS — M79.89 LEG SWELLING: ICD-10-CM

## 2022-04-05 DIAGNOSIS — R06.02 SHORTNESS OF BREATH: ICD-10-CM

## 2022-04-05 DIAGNOSIS — C34.81 MALIGNANT NEOPLASM OF OVERLAPPING SITES OF RIGHT LUNG (HCC): Primary | ICD-10-CM

## 2022-04-05 DIAGNOSIS — F32.9 REACTIVE DEPRESSION: ICD-10-CM

## 2022-04-05 LAB
ALBUMIN SERPL-MCNC: 2.8 G/DL (ref 3.4–5)
ALBUMIN/GLOB SERPL: 0.7 {RATIO} (ref 1–2)
ALP LIVER SERPL-CCNC: 82 U/L
ALT SERPL-CCNC: 22 U/L
ANION GAP SERPL CALC-SCNC: 5 MMOL/L (ref 0–18)
AST SERPL-CCNC: 22 U/L (ref 15–37)
BASOPHILS # BLD AUTO: 0.02 X10(3) UL (ref 0–0.2)
BASOPHILS NFR BLD AUTO: 0.3 %
BILIRUB SERPL-MCNC: 0.2 MG/DL (ref 0.1–2)
BUN BLD-MCNC: 26 MG/DL (ref 7–18)
CALCIUM BLD-MCNC: 9.3 MG/DL (ref 8.5–10.1)
CEA SERPL-MCNC: 3.4 NG/ML (ref ?–5)
CHLORIDE SERPL-SCNC: 101 MMOL/L (ref 98–112)
CO2 SERPL-SCNC: 29 MMOL/L (ref 21–32)
CREAT BLD-MCNC: 1.18 MG/DL
EOSINOPHIL # BLD AUTO: 0.1 X10(3) UL (ref 0–0.7)
EOSINOPHIL NFR BLD AUTO: 1.7 %
ERYTHROCYTE [DISTWIDTH] IN BLOOD BY AUTOMATED COUNT: 13.8 %
FASTING STATUS PATIENT QL REPORTED: NO
GLOBULIN PLAS-MCNC: 4 G/DL (ref 2.8–4.4)
GLUCOSE BLD-MCNC: 111 MG/DL (ref 70–99)
HCT VFR BLD AUTO: 34 %
HGB BLD-MCNC: 10.9 G/DL
IMM GRANULOCYTES # BLD AUTO: 0.01 X10(3) UL (ref 0–1)
IMM GRANULOCYTES NFR BLD: 0.2 %
LDH SERPL L TO P-CCNC: 173 U/L
LYMPHOCYTES # BLD AUTO: 2.58 X10(3) UL (ref 1–4)
LYMPHOCYTES NFR BLD AUTO: 44.8 %
MCH RBC QN AUTO: 28.4 PG (ref 26–34)
MCHC RBC AUTO-ENTMCNC: 32.1 G/DL (ref 31–37)
MCV RBC AUTO: 88.5 FL
MONOCYTES # BLD AUTO: 0.26 X10(3) UL (ref 0.1–1)
MONOCYTES NFR BLD AUTO: 4.5 %
NEUTROPHILS # BLD AUTO: 2.79 X10 (3) UL (ref 1.5–7.7)
NEUTROPHILS # BLD AUTO: 2.79 X10(3) UL (ref 1.5–7.7)
NEUTROPHILS NFR BLD AUTO: 48.5 %
OSMOLALITY SERPL CALC.SUM OF ELEC: 285 MOSM/KG (ref 275–295)
PLATELET # BLD AUTO: 310 10(3)UL (ref 150–450)
POTASSIUM SERPL-SCNC: 4.5 MMOL/L (ref 3.5–5.1)
PROT SERPL-MCNC: 6.8 G/DL (ref 6.4–8.2)
RBC # BLD AUTO: 3.84 X10(6)UL
SODIUM SERPL-SCNC: 135 MMOL/L (ref 136–145)
WBC # BLD AUTO: 5.8 X10(3) UL (ref 4–11)

## 2022-04-05 PROCEDURE — 84443 ASSAY THYROID STIM HORMONE: CPT | Performed by: NURSE PRACTITIONER

## 2022-04-05 PROCEDURE — 86140 C-REACTIVE PROTEIN: CPT | Performed by: NURSE PRACTITIONER

## 2022-04-05 PROCEDURE — 82533 TOTAL CORTISOL: CPT | Performed by: NURSE PRACTITIONER

## 2022-04-05 PROCEDURE — 83615 LACTATE (LD) (LDH) ENZYME: CPT | Performed by: NURSE PRACTITIONER

## 2022-04-05 PROCEDURE — 83880 ASSAY OF NATRIURETIC PEPTIDE: CPT | Performed by: NURSE PRACTITIONER

## 2022-04-05 PROCEDURE — 36415 COLL VENOUS BLD VENIPUNCTURE: CPT

## 2022-04-05 PROCEDURE — 80053 COMPREHEN METABOLIC PANEL: CPT | Performed by: NURSE PRACTITIONER

## 2022-04-05 PROCEDURE — 84439 ASSAY OF FREE THYROXINE: CPT | Performed by: NURSE PRACTITIONER

## 2022-04-05 PROCEDURE — 85025 COMPLETE CBC W/AUTO DIFF WBC: CPT | Performed by: NURSE PRACTITIONER

## 2022-04-05 PROCEDURE — 82378 CARCINOEMBRYONIC ANTIGEN: CPT | Performed by: NURSE PRACTITIONER

## 2022-04-05 NOTE — PROGRESS NOTES
Here for C1D8 Gemzar  C/O body aches and chills started Thursday after 1st chemo. Started abx. Cough better. Continues to have chills. No n/v  Constipation, hard BM yesterday. Takes Miralax PRN   Appetite poor, +taste changes. Taking oxycodone once every other night.

## 2022-04-06 LAB
CORTIS SERPL-MCNC: 14.2 UG/DL
CRP SERPL-MCNC: 0.55 MG/DL (ref ?–0.3)
NT-PROBNP SERPL-MCNC: 285 PG/ML (ref ?–450)
T4 FREE SERPL-MCNC: 1.2 NG/DL (ref 0.8–1.7)
TSI SER-ACNC: 2.18 MIU/ML (ref 0.36–3.74)

## 2022-04-06 RX ORDER — FUROSEMIDE 20 MG/1
20 TABLET ORAL 2 TIMES DAILY
Qty: 90 TABLET | Refills: 0 | Status: SHIPPED | OUTPATIENT
Start: 2022-04-06

## 2022-04-06 NOTE — TELEPHONE ENCOUNTER
Patient needs a new prescription sent over for Furosemide 20 mg as Dr Benjamín Hope has changed how this medication is to be taken

## 2022-04-11 ENCOUNTER — HOSPITAL ENCOUNTER (OUTPATIENT)
Dept: CT IMAGING | Facility: HOSPITAL | Age: 77
Discharge: HOME | End: 2022-04-11
Attending: INTERNAL MEDICINE
Payer: MEDICARE

## 2022-04-11 DIAGNOSIS — C78.7 LIVER METASTASES (HCC): ICD-10-CM

## 2022-04-11 DIAGNOSIS — R59.9 ADENOPATHY: ICD-10-CM

## 2022-04-11 DIAGNOSIS — C34.81 MALIGNANT NEOPLASM OF OVERLAPPING SITES OF RIGHT LUNG (HCC): ICD-10-CM

## 2022-04-11 PROCEDURE — 74177 CT ABD & PELVIS W/CONTRAST: CPT | Performed by: INTERNAL MEDICINE

## 2022-04-11 PROCEDURE — 71260 CT THORAX DX C+: CPT | Performed by: INTERNAL MEDICINE

## 2022-04-11 RX ORDER — IOHEXOL 350 MG/ML
100 INJECTION, SOLUTION INTRAVENOUS
Status: COMPLETED | OUTPATIENT
Start: 2022-04-11 | End: 2022-04-11

## 2022-04-11 RX ADMIN — IOHEXOL 100 ML: 350 INJECTION, SOLUTION INTRAVENOUS at 09:18:00

## 2022-04-12 ENCOUNTER — OFFICE VISIT (OUTPATIENT)
Dept: HEMATOLOGY/ONCOLOGY | Age: 77
End: 2022-04-12
Attending: INTERNAL MEDICINE
Payer: MEDICARE

## 2022-04-12 VITALS
TEMPERATURE: 98 F | DIASTOLIC BLOOD PRESSURE: 70 MMHG | OXYGEN SATURATION: 98 % | HEIGHT: 69.02 IN | WEIGHT: 190 LBS | RESPIRATION RATE: 18 BRPM | HEART RATE: 68 BPM | BODY MASS INDEX: 28.14 KG/M2 | SYSTOLIC BLOOD PRESSURE: 142 MMHG

## 2022-04-12 DIAGNOSIS — R80.8 OTHER PROTEINURIA: Primary | ICD-10-CM

## 2022-04-12 DIAGNOSIS — R06.02 SHORTNESS OF BREATH: ICD-10-CM

## 2022-04-12 DIAGNOSIS — M54.6 ACUTE MIDLINE THORACIC BACK PAIN: ICD-10-CM

## 2022-04-12 DIAGNOSIS — R05.9 COUGH: ICD-10-CM

## 2022-04-12 DIAGNOSIS — C34.81 MALIGNANT NEOPLASM OF OVERLAPPING SITES OF RIGHT LUNG (HCC): Primary | ICD-10-CM

## 2022-04-12 DIAGNOSIS — M79.89 LEG SWELLING: ICD-10-CM

## 2022-04-12 DIAGNOSIS — Z86.711 HISTORY OF PULMONARY EMBOLISM: ICD-10-CM

## 2022-04-12 DIAGNOSIS — R59.9 ADENOPATHY: ICD-10-CM

## 2022-04-12 DIAGNOSIS — G89.3 NEOPLASM RELATED PAIN: ICD-10-CM

## 2022-04-12 DIAGNOSIS — T88.7XXA MEDICATION SIDE EFFECTS: ICD-10-CM

## 2022-04-12 DIAGNOSIS — F32.9 REACTIVE DEPRESSION: ICD-10-CM

## 2022-04-12 DIAGNOSIS — D72.820 MONOCLONAL B-CELL LYMPHOCYTOSIS: ICD-10-CM

## 2022-04-12 DIAGNOSIS — C34.81 MALIGNANT NEOPLASM OF OVERLAPPING SITES OF RIGHT LUNG (HCC): ICD-10-CM

## 2022-04-12 DIAGNOSIS — C78.7 LIVER METASTASES (HCC): ICD-10-CM

## 2022-04-12 LAB
ALBUMIN SERPL-MCNC: 3 G/DL (ref 3.4–5)
ALBUMIN/GLOB SERPL: 0.9 {RATIO} (ref 1–2)
ALP LIVER SERPL-CCNC: 81 U/L
ALT SERPL-CCNC: 19 U/L
ANION GAP SERPL CALC-SCNC: 8 MMOL/L (ref 0–18)
AST SERPL-CCNC: 19 U/L (ref 15–37)
BASOPHILS # BLD AUTO: 0.06 X10(3) UL (ref 0–0.2)
BASOPHILS NFR BLD AUTO: 0.9 %
BILIRUB SERPL-MCNC: 0.3 MG/DL (ref 0.1–2)
BILIRUB UR QL STRIP.AUTO: NEGATIVE
BUN BLD-MCNC: 33 MG/DL (ref 7–18)
CALCIUM BLD-MCNC: 8.7 MG/DL (ref 8.5–10.1)
CHLORIDE SERPL-SCNC: 101 MMOL/L (ref 98–112)
CLARITY UR REFRACT.AUTO: CLEAR
CO2 SERPL-SCNC: 27 MMOL/L (ref 21–32)
COLOR UR AUTO: YELLOW
CREAT BLD-MCNC: 1.31 MG/DL
EOSINOPHIL # BLD AUTO: 0.13 X10(3) UL (ref 0–0.7)
EOSINOPHIL NFR BLD AUTO: 2 %
ERYTHROCYTE [DISTWIDTH] IN BLOOD BY AUTOMATED COUNT: 15 %
ERYTHROCYTE [SEDIMENTATION RATE] IN BLOOD: 35 MM/HR
FASTING STATUS PATIENT QL REPORTED: NO
GLOBULIN PLAS-MCNC: 3.5 G/DL (ref 2.8–4.4)
GLUCOSE BLD-MCNC: 170 MG/DL (ref 70–99)
GLUCOSE UR STRIP.AUTO-MCNC: NEGATIVE MG/DL
HCT VFR BLD AUTO: 33.9 %
HGB BLD-MCNC: 10.9 G/DL
IMM GRANULOCYTES # BLD AUTO: 0.01 X10(3) UL (ref 0–1)
IMM GRANULOCYTES NFR BLD: 0.2 %
KETONES UR STRIP.AUTO-MCNC: NEGATIVE MG/DL
LEUKOCYTE ESTERASE UR QL STRIP.AUTO: NEGATIVE
LYMPHOCYTES # BLD AUTO: 2.76 X10(3) UL (ref 1–4)
LYMPHOCYTES NFR BLD AUTO: 42.1 %
MCH RBC QN AUTO: 28.7 PG (ref 26–34)
MCHC RBC AUTO-ENTMCNC: 32.2 G/DL (ref 31–37)
MCV RBC AUTO: 89.2 FL
MONOCYTES # BLD AUTO: 0.45 X10(3) UL (ref 0.1–1)
MONOCYTES NFR BLD AUTO: 6.9 %
NEUTROPHILS # BLD AUTO: 3.14 X10 (3) UL (ref 1.5–7.7)
NEUTROPHILS # BLD AUTO: 3.14 X10(3) UL (ref 1.5–7.7)
NEUTROPHILS NFR BLD AUTO: 47.9 %
NITRITE UR QL STRIP.AUTO: NEGATIVE
OSMOLALITY SERPL CALC.SUM OF ELEC: 293 MOSM/KG (ref 275–295)
PH UR STRIP.AUTO: 6 [PH] (ref 5–8)
PLATELET # BLD AUTO: 441 10(3)UL (ref 150–450)
POTASSIUM SERPL-SCNC: 4.4 MMOL/L (ref 3.5–5.1)
PROT SERPL-MCNC: 6.5 G/DL (ref 6.4–8.2)
PROT UR STRIP.AUTO-MCNC: >=300 MG/DL
RBC # BLD AUTO: 3.8 X10(6)UL
SODIUM SERPL-SCNC: 136 MMOL/L (ref 136–145)
SP GR UR STRIP.AUTO: 1.02 (ref 1–1.03)
T4 FREE SERPL-MCNC: 1.1 NG/DL (ref 0.8–1.7)
TSI SER-ACNC: 2.18 MIU/ML (ref 0.36–3.74)
UROBILINOGEN UR STRIP.AUTO-MCNC: 0.2 MG/DL
WBC # BLD AUTO: 6.6 X10(3) UL (ref 4–11)

## 2022-04-12 PROCEDURE — 85652 RBC SED RATE AUTOMATED: CPT | Performed by: INTERNAL MEDICINE

## 2022-04-12 PROCEDURE — 84443 ASSAY THYROID STIM HORMONE: CPT | Performed by: INTERNAL MEDICINE

## 2022-04-12 PROCEDURE — 84439 ASSAY OF FREE THYROXINE: CPT | Performed by: INTERNAL MEDICINE

## 2022-04-12 PROCEDURE — 85025 COMPLETE CBC W/AUTO DIFF WBC: CPT | Performed by: INTERNAL MEDICINE

## 2022-04-12 PROCEDURE — 82024 ASSAY OF ACTH: CPT | Performed by: INTERNAL MEDICINE

## 2022-04-12 PROCEDURE — 81001 URINALYSIS AUTO W/SCOPE: CPT | Performed by: INTERNAL MEDICINE

## 2022-04-12 PROCEDURE — 36415 COLL VENOUS BLD VENIPUNCTURE: CPT

## 2022-04-12 PROCEDURE — 80053 COMPREHEN METABOLIC PANEL: CPT | Performed by: INTERNAL MEDICINE

## 2022-04-12 PROCEDURE — 96413 CHEMO IV INFUSION 1 HR: CPT

## 2022-04-12 PROCEDURE — 96417 CHEMO IV INFUS EACH ADDL SEQ: CPT

## 2022-04-12 RX ORDER — TORSEMIDE 20 MG/1
20 TABLET ORAL DAILY
COMMUNITY
Start: 2022-04-08

## 2022-04-12 NOTE — PROGRESS NOTES
Pt here for C1D1. Arrives Ambulating independently, accompanied by Self           Pregnancy screening: Not applicable    Modifications in dose or schedule: No     Frequency of blood return and site check throughout administration: Prior to administration and At completion of therapy   Discharged to Home, Ambulating independently, accompanied by:Self    Outpatient Oncology Care Plan  Problem list:  knowledge deficit  Problems related to:    chemotherapy  Interventions:  emotional support given  maintain safe environment  patient/family supported  chemotherapy teaching  monitor effect of therapy  monitor lab values  promoted rest  provided general teaching  Expected outcomes:  understands plan of care  Progress towards outcome:  making progress    Education Record    Learner:  Patient  Barriers / Limitations:  None  Method:  Discussion  Outcome:  Shows understanding  Comments: Patient ambulatory with no complaints. Changed back to his immunotherapy regimen. Tolerated treatment today.  Discharged home in stable condition

## 2022-04-12 NOTE — PROGRESS NOTES
Pt here for 1 week MD f/u and due to start treatment. Energy level and appetite are fair, pushes himself to eat. Denies pain, no SOB or cough.      Outpatient Oncology Care Plan  Problem list:  fatigue  knowledge deficit    Problems related to:    disease/disease progression    Interventions:  provided general teaching    Expected outcomes:  safe in environment  symptoms relieved/minimized  understands plan of care    Progress towards outcome:  making progress    Education Record    Learner:  Patient  Barriers / Limitations:  None  Method:  Reinforcement  Outcome:  Shows understanding  Comments:

## 2022-04-13 ENCOUNTER — TELEPHONE (OUTPATIENT)
Dept: HEMATOLOGY/ONCOLOGY | Facility: HOSPITAL | Age: 77
End: 2022-04-13

## 2022-04-13 LAB — ADRENOCORTICOTROPIC HORMONE: 4.5 PG/ML

## 2022-04-13 NOTE — TELEPHONE ENCOUNTER
Toxicities: C1 D1 Ipilimumab/Nivolumab on 4/12/2022    I attempted to reach Marcy Agrawal to see how he is feeling after his first treatment yesterday. I left a voice mail message asking him to please return my call at his earliest convenience.

## 2022-04-13 NOTE — TELEPHONE ENCOUNTER
Marcy Agrawal reports that his blood sugar went up to 398. He took novolog insulin per his doctor's instructions then in the middle of the night his blood sugar dropped. He did say he is seeing his doctor tomorrow about his blood sugar. He denies any other side effects at this time. I encouraged him to please call the office if he is not feeling well or he has any questions or concerns. He thanked me for checking on him.

## 2022-04-19 PROCEDURE — 84156 ASSAY OF PROTEIN URINE: CPT

## 2022-04-20 ENCOUNTER — LAB ENCOUNTER (OUTPATIENT)
Dept: LAB | Facility: HOSPITAL | Age: 77
End: 2022-04-20
Attending: INTERNAL MEDICINE
Payer: MEDICARE

## 2022-04-20 ENCOUNTER — TELEPHONE (OUTPATIENT)
Dept: HEMATOLOGY/ONCOLOGY | Facility: HOSPITAL | Age: 77
End: 2022-04-20

## 2022-04-20 DIAGNOSIS — R80.8 OTHER PROTEINURIA: ICD-10-CM

## 2022-04-20 LAB
M PROTEIN 24H UR ELPH-MRATE: 4448.6 MG/24 HR (ref ?–149.1)
SPECIMEN VOL UR: 2600 ML

## 2022-04-21 ENCOUNTER — OFFICE VISIT (OUTPATIENT)
Dept: NEPHROLOGY | Facility: CLINIC | Age: 77
End: 2022-04-21
Payer: MEDICARE

## 2022-04-21 VITALS — WEIGHT: 192.38 LBS | SYSTOLIC BLOOD PRESSURE: 110 MMHG | DIASTOLIC BLOOD PRESSURE: 40 MMHG | BODY MASS INDEX: 28 KG/M2

## 2022-04-21 DIAGNOSIS — N28.9 RENAL INSUFFICIENCY: ICD-10-CM

## 2022-04-21 DIAGNOSIS — C34.90 NON-SMALL CELL LUNG CANCER, UNSPECIFIED LATERALITY (HCC): ICD-10-CM

## 2022-04-21 DIAGNOSIS — R80.9 NEPHROTIC RANGE PROTEINURIA: Primary | ICD-10-CM

## 2022-04-21 NOTE — TELEPHONE ENCOUNTER
Called Ulises Campbell and went over 24 hour urine for protein results. Significant proteinuria c/w nephrotic syndrome. Make explain recent development of more significant edema. I told him this may be AE from immunotherapy. Would recommend he see nephrology. Referred to [facility]. He verbalized understanding.

## 2022-04-22 ENCOUNTER — LAB ENCOUNTER (OUTPATIENT)
Dept: LAB | Facility: HOSPITAL | Age: 77
End: 2022-04-22
Attending: INTERNAL MEDICINE
Payer: MEDICARE

## 2022-04-22 ENCOUNTER — TELEPHONE (OUTPATIENT)
Dept: NEPHROLOGY | Facility: CLINIC | Age: 77
End: 2022-04-22

## 2022-04-22 ENCOUNTER — TELEPHONE (OUTPATIENT)
Dept: HEMATOLOGY/ONCOLOGY | Facility: HOSPITAL | Age: 77
End: 2022-04-22

## 2022-04-22 DIAGNOSIS — R80.9 NEPHROTIC RANGE PROTEINURIA: ICD-10-CM

## 2022-04-22 DIAGNOSIS — N28.9 RENAL INSUFFICIENCY: ICD-10-CM

## 2022-04-22 DIAGNOSIS — C34.81 MALIGNANT NEOPLASM OF OVERLAPPING SITES OF RIGHT LUNG (HCC): ICD-10-CM

## 2022-04-22 LAB
ALBUMIN SERPL-MCNC: 2.9 G/DL (ref 3.4–5)
ALBUMIN/GLOB SERPL: 0.9 {RATIO} (ref 1–2)
ALP LIVER SERPL-CCNC: 82 U/L
ALT SERPL-CCNC: 16 U/L
ANION GAP SERPL CALC-SCNC: 5 MMOL/L (ref 0–18)
AST SERPL-CCNC: 13 U/L (ref 15–37)
BASOPHILS # BLD AUTO: 0.06 X10(3) UL (ref 0–0.2)
BASOPHILS NFR BLD AUTO: 0.8 %
BILIRUB SERPL-MCNC: 0.4 MG/DL (ref 0.1–2)
BILIRUB UR QL STRIP.AUTO: NEGATIVE
BUN BLD-MCNC: 29 MG/DL (ref 7–18)
C3 SERPL-MCNC: 87.3 MG/DL (ref 90–180)
C4 SERPL-MCNC: 21 MG/DL (ref 10–40)
CALCIUM BLD-MCNC: 8.8 MG/DL (ref 8.5–10.1)
CHLORIDE SERPL-SCNC: 105 MMOL/L (ref 98–112)
CLARITY UR REFRACT.AUTO: CLEAR
CO2 SERPL-SCNC: 30 MMOL/L (ref 21–32)
COLOR UR AUTO: YELLOW
CREAT BLD-MCNC: 1.2 MG/DL
CREAT UR-SCNC: 126 MG/DL
EOSINOPHIL # BLD AUTO: 0.22 X10(3) UL (ref 0–0.7)
EOSINOPHIL NFR BLD AUTO: 2.9 %
ERYTHROCYTE [DISTWIDTH] IN BLOOD BY AUTOMATED COUNT: 14.7 %
FASTING STATUS PATIENT QL REPORTED: YES
GLOBULIN PLAS-MCNC: 3.2 G/DL (ref 2.8–4.4)
GLUCOSE BLD-MCNC: 120 MG/DL (ref 70–99)
GLUCOSE UR STRIP.AUTO-MCNC: NEGATIVE MG/DL
HBV SURFACE AG SER-ACNC: <0.1 [IU]/L
HBV SURFACE AG SERPL QL IA: NONREACTIVE
HCT VFR BLD AUTO: 36.6 %
HCV AB SERPL QL IA: NONREACTIVE
HGB BLD-MCNC: 11.4 G/DL
IMM GRANULOCYTES # BLD AUTO: 0.03 X10(3) UL (ref 0–1)
IMM GRANULOCYTES NFR BLD: 0.4 %
INR BLD: 1.13 (ref 0.8–1.2)
KETONES UR STRIP.AUTO-MCNC: NEGATIVE MG/DL
LEUKOCYTE ESTERASE UR QL STRIP.AUTO: NEGATIVE
LYMPHOCYTES # BLD AUTO: 3.06 X10(3) UL (ref 1–4)
LYMPHOCYTES NFR BLD AUTO: 40.1 %
MCH RBC QN AUTO: 28.5 PG (ref 26–34)
MCHC RBC AUTO-ENTMCNC: 31.1 G/DL (ref 31–37)
MCV RBC AUTO: 91.5 FL
MONOCYTES # BLD AUTO: 0.43 X10(3) UL (ref 0.1–1)
MONOCYTES NFR BLD AUTO: 5.6 %
NEUTROPHILS # BLD AUTO: 3.83 X10 (3) UL (ref 1.5–7.7)
NEUTROPHILS # BLD AUTO: 3.83 X10(3) UL (ref 1.5–7.7)
NEUTROPHILS NFR BLD AUTO: 50.2 %
NITRITE UR QL STRIP.AUTO: NEGATIVE
OSMOLALITY SERPL CALC.SUM OF ELEC: 297 MOSM/KG (ref 275–295)
PH UR STRIP.AUTO: 5 [PH] (ref 5–8)
PHOSPHATE SERPL-MCNC: 3.3 MG/DL (ref 2.5–4.9)
PLATELET # BLD AUTO: 205 10(3)UL (ref 150–450)
POTASSIUM SERPL-SCNC: 4.7 MMOL/L (ref 3.5–5.1)
PROT SERPL-MCNC: 6.1 G/DL (ref 6.4–8.2)
PROT UR STRIP.AUTO-MCNC: >=500 MG/DL
PROT UR-MCNC: 515.6 MG/DL
PROTHROMBIN TIME: 14.5 SECONDS (ref 11.6–14.8)
RBC # BLD AUTO: 4 X10(6)UL
RBC #/AREA URNS AUTO: >10 /HPF
SODIUM SERPL-SCNC: 140 MMOL/L (ref 136–145)
SP GR UR STRIP.AUTO: 1.02 (ref 1–1.03)
UROBILINOGEN UR STRIP.AUTO-MCNC: <2 MG/DL
WBC # BLD AUTO: 7.6 X10(3) UL (ref 4–11)

## 2022-04-22 PROCEDURE — 85610 PROTHROMBIN TIME: CPT

## 2022-04-22 PROCEDURE — 86160 COMPLEMENT ANTIGEN: CPT

## 2022-04-22 PROCEDURE — 84156 ASSAY OF PROTEIN URINE: CPT

## 2022-04-22 PROCEDURE — 86036 ANCA SCREEN EACH ANTIBODY: CPT

## 2022-04-22 PROCEDURE — 80053 COMPREHEN METABOLIC PANEL: CPT

## 2022-04-22 PROCEDURE — 86803 HEPATITIS C AB TEST: CPT

## 2022-04-22 PROCEDURE — 86038 ANTINUCLEAR ANTIBODIES: CPT

## 2022-04-22 PROCEDURE — 84100 ASSAY OF PHOSPHORUS: CPT

## 2022-04-22 PROCEDURE — 82570 ASSAY OF URINE CREATININE: CPT

## 2022-04-22 PROCEDURE — 81001 URINALYSIS AUTO W/SCOPE: CPT

## 2022-04-22 PROCEDURE — 83516 IMMUNOASSAY NONANTIBODY: CPT

## 2022-04-22 PROCEDURE — 87340 HEPATITIS B SURFACE AG IA: CPT

## 2022-04-22 PROCEDURE — 85025 COMPLETE CBC W/AUTO DIFF WBC: CPT

## 2022-04-22 PROCEDURE — 36415 COLL VENOUS BLD VENIPUNCTURE: CPT

## 2022-04-22 NOTE — TELEPHONE ENCOUNTER
Raimundo Pauletteludin acosta says he can not get in to do biopsy until may 3rd. Was told to cx 4/26 appts and wants to know when to reschedule.  anthony

## 2022-04-25 LAB
MYELOPEROX ANTIBODIES, IGG: 1 AU/ML
SERINE PROTEASE 3, IGG: 2 AU/ML

## 2022-04-26 ENCOUNTER — APPOINTMENT (OUTPATIENT)
Dept: HEMATOLOGY/ONCOLOGY | Age: 77
End: 2022-04-26
Attending: INTERNAL MEDICINE
Payer: MEDICARE

## 2022-04-26 ENCOUNTER — OFFICE VISIT (OUTPATIENT)
Dept: HEMATOLOGY/ONCOLOGY | Age: 77
End: 2022-04-26
Attending: INTERNAL MEDICINE
Payer: MEDICARE

## 2022-04-26 VITALS
TEMPERATURE: 96 F | WEIGHT: 190 LBS | OXYGEN SATURATION: 97 % | HEART RATE: 62 BPM | SYSTOLIC BLOOD PRESSURE: 168 MMHG | DIASTOLIC BLOOD PRESSURE: 71 MMHG | HEIGHT: 69.02 IN | BODY MASS INDEX: 28.14 KG/M2

## 2022-04-26 DIAGNOSIS — Z51.12 ENCOUNTER FOR ANTINEOPLASTIC IMMUNOTHERAPY: Primary | ICD-10-CM

## 2022-04-26 DIAGNOSIS — T88.7XXA MEDICATION SIDE EFFECTS: ICD-10-CM

## 2022-04-26 DIAGNOSIS — C34.81 MALIGNANT NEOPLASM OF OVERLAPPING SITES OF RIGHT LUNG (HCC): ICD-10-CM

## 2022-04-26 DIAGNOSIS — N64.9 ABNORMAL NIPPLE: ICD-10-CM

## 2022-04-26 DIAGNOSIS — R80.8 OTHER PROTEINURIA: ICD-10-CM

## 2022-04-26 DIAGNOSIS — C78.7 LIVER METASTASES (HCC): ICD-10-CM

## 2022-04-26 DIAGNOSIS — C34.81 MALIGNANT NEOPLASM OF OVERLAPPING SITES OF RIGHT LUNG (HCC): Primary | ICD-10-CM

## 2022-04-26 DIAGNOSIS — G89.3 NEOPLASM RELATED PAIN: ICD-10-CM

## 2022-04-26 DIAGNOSIS — R53.83 FATIGUE DUE TO TREATMENT: ICD-10-CM

## 2022-04-26 PROCEDURE — 99214 OFFICE O/P EST MOD 30 MIN: CPT | Performed by: CLINICAL NURSE SPECIALIST

## 2022-04-26 NOTE — PROGRESS NOTES
Pt here for 2 week APN f/u and due for opdivo. Energy level is a little less than previous, appetite has been fair, makes himself eat, taste is altered. Pt notes weakness in legs, although has been active. No bowel issues, had an episode of nausea, resolved with meds.      Outpatient Oncology Care Plan  Problem list:  fatigue  knowledge deficit    Problems related to:    disease/disease progression    Interventions:  provided general teaching    Expected outcomes:  safe in environment  symptoms relieved/minimized  understands plan of care    Progress towards outcome:  making progress    Education Record    Learner:  Patient  Barriers / Limitations:  None  Method:  Reinforcement  Outcome:  Shows understanding  Comments:

## 2022-04-26 NOTE — PROGRESS NOTES
Appt made for pt with Dr. Baldo Barnes on [address] 4/28 at 1400, instructed to bring med list, drivers license and insurance card and wear mask. Gave message to wife with info. Verbalized understanding.

## 2022-04-27 LAB — ANA SER QL: NEGATIVE

## 2022-04-28 ENCOUNTER — TELEPHONE (OUTPATIENT)
Dept: NEPHROLOGY | Facility: CLINIC | Age: 77
End: 2022-04-28

## 2022-04-28 VITALS — WEIGHT: 190 LBS | HEIGHT: 69 IN | BODY MASS INDEX: 28.14 KG/M2

## 2022-04-28 RX ORDER — FOLIC ACID 1 MG/1
1 TABLET ORAL DAILY
COMMUNITY
Start: 2022-04-19

## 2022-04-30 ENCOUNTER — LAB ENCOUNTER (OUTPATIENT)
Dept: LAB | Age: 77
End: 2022-04-30
Attending: INTERNAL MEDICINE
Payer: MEDICARE

## 2022-04-30 DIAGNOSIS — R80.9 PROTEINURIA, UNSPECIFIED TYPE: ICD-10-CM

## 2022-05-01 LAB — SARS-COV-2 RNA RESP QL NAA+PROBE: NOT DETECTED

## 2022-05-02 ENCOUNTER — ORDER TRANSCRIPTION (OUTPATIENT)
Dept: ADMINISTRATIVE | Facility: HOSPITAL | Age: 77
End: 2022-05-02

## 2022-05-03 ENCOUNTER — HOSPITAL ENCOUNTER (OUTPATIENT)
Dept: ULTRASOUND IMAGING | Facility: HOSPITAL | Age: 77
Discharge: HOME | End: 2022-05-03
Attending: INTERNAL MEDICINE
Payer: MEDICARE

## 2022-05-03 ENCOUNTER — NURSE ONLY (OUTPATIENT)
Dept: LAB | Facility: HOSPITAL | Age: 77
End: 2022-05-03
Attending: INTERNAL MEDICINE
Payer: MEDICARE

## 2022-05-03 ENCOUNTER — HOSPITAL ENCOUNTER (OUTPATIENT)
Dept: CT IMAGING | Facility: HOSPITAL | Age: 77
Discharge: HOME | End: 2022-05-03
Attending: INTERNAL MEDICINE
Payer: MEDICARE

## 2022-05-03 VITALS
RESPIRATION RATE: 16 BRPM | DIASTOLIC BLOOD PRESSURE: 65 MMHG | HEART RATE: 59 BPM | SYSTOLIC BLOOD PRESSURE: 163 MMHG | OXYGEN SATURATION: 97 % | TEMPERATURE: 97 F

## 2022-05-03 DIAGNOSIS — R80.9 PROTEINURIA: ICD-10-CM

## 2022-05-03 DIAGNOSIS — R80.9 PROTEINURIA, UNSPECIFIED TYPE: Primary | ICD-10-CM

## 2022-05-03 LAB — GLUCOSE BLD-MCNC: 96 MG/DL (ref 70–99)

## 2022-05-03 PROCEDURE — 88305 TISSUE EXAM BY PATHOLOGIST: CPT | Performed by: INTERNAL MEDICINE

## 2022-05-03 PROCEDURE — 88313 SPECIAL STAINS GROUP 2: CPT | Performed by: INTERNAL MEDICINE

## 2022-05-03 PROCEDURE — 88350 IMFLUOR EA ADDL 1ANTB STN PX: CPT | Performed by: INTERNAL MEDICINE

## 2022-05-03 PROCEDURE — 99152 MOD SED SAME PHYS/QHP 5/>YRS: CPT | Performed by: INTERNAL MEDICINE

## 2022-05-03 PROCEDURE — 76942 ECHO GUIDE FOR BIOPSY: CPT | Performed by: INTERNAL MEDICINE

## 2022-05-03 PROCEDURE — 88346 IMFLUOR 1ST 1ANTB STAIN PX: CPT | Performed by: INTERNAL MEDICINE

## 2022-05-03 PROCEDURE — 88348 ELECTRON MICROSCOPY DX: CPT | Performed by: INTERNAL MEDICINE

## 2022-05-03 PROCEDURE — 82962 GLUCOSE BLOOD TEST: CPT

## 2022-05-03 PROCEDURE — 50200 RENAL BIOPSY PERQ: CPT | Performed by: INTERNAL MEDICINE

## 2022-05-03 RX ORDER — MIDAZOLAM HYDROCHLORIDE 1 MG/ML
1 INJECTION INTRAMUSCULAR; INTRAVENOUS EVERY 5 MIN PRN
Status: ACTIVE | OUTPATIENT
Start: 2022-05-03 | End: 2022-05-03

## 2022-05-03 RX ORDER — FLUMAZENIL 0.1 MG/ML
0.2 INJECTION, SOLUTION INTRAVENOUS AS NEEDED
Status: DISCONTINUED | OUTPATIENT
Start: 2022-05-03 | End: 2022-05-05

## 2022-05-03 RX ORDER — NALOXONE HYDROCHLORIDE 0.4 MG/ML
80 INJECTION, SOLUTION INTRAMUSCULAR; INTRAVENOUS; SUBCUTANEOUS AS NEEDED
Status: DISCONTINUED | OUTPATIENT
Start: 2022-05-03 | End: 2022-05-05

## 2022-05-03 RX ORDER — ONDANSETRON 2 MG/ML
INJECTION INTRAMUSCULAR; INTRAVENOUS
Status: DISCONTINUED
Start: 2022-05-03 | End: 2022-05-03 | Stop reason: WASHOUT

## 2022-05-03 RX ORDER — SODIUM CHLORIDE 9 MG/ML
INJECTION, SOLUTION INTRAVENOUS CONTINUOUS
Status: DISCONTINUED | OUTPATIENT
Start: 2022-05-03 | End: 2022-05-05

## 2022-05-03 RX ORDER — MIDAZOLAM HYDROCHLORIDE 1 MG/ML
INJECTION INTRAMUSCULAR; INTRAVENOUS
Status: COMPLETED
Start: 2022-05-03 | End: 2022-05-03

## 2022-05-03 RX ORDER — NALOXONE HYDROCHLORIDE 0.4 MG/ML
INJECTION, SOLUTION INTRAMUSCULAR; INTRAVENOUS; SUBCUTANEOUS
Status: DISCONTINUED
Start: 2022-05-03 | End: 2022-05-03 | Stop reason: WASHOUT

## 2022-05-03 RX ORDER — FLUMAZENIL 0.1 MG/ML
INJECTION, SOLUTION INTRAVENOUS
Status: DISCONTINUED
Start: 2022-05-03 | End: 2022-05-03 | Stop reason: WASHOUT

## 2022-05-03 RX ORDER — HYDRALAZINE HYDROCHLORIDE 20 MG/ML
INJECTION INTRAMUSCULAR; INTRAVENOUS
Status: DISCONTINUED
Start: 2022-05-03 | End: 2022-05-03 | Stop reason: WASHOUT

## 2022-05-03 RX ADMIN — SODIUM CHLORIDE: 9 INJECTION, SOLUTION INTRAVENOUS at 08:44:00

## 2022-05-03 RX ADMIN — MIDAZOLAM HYDROCHLORIDE 1 MG: 1 INJECTION INTRAMUSCULAR; INTRAVENOUS at 08:55:00

## 2022-05-03 NOTE — IMAGING NOTE
Mook Connors, name, date of birth and allergies verified with patient. Informed consent obtained by Dr Kaelyn Cano for a     Ultrasound-guided left renal biopsy. Patient tolerated procedure well. Noted dressing to left flank clean and dry. Presently denies pain. Report given to Pillo Cheng at [facility]. Transported  To room 2261.

## 2022-05-03 NOTE — PROCEDURES
[facility]  Procedure Note    [address] Patient Status:  Outpatient    2/3/1945 MRN [id number]   [facility] ULTRASOUND Attending Nimisha Earl MD   Hosp Day # 0 PCP Denia BERRY     Procedure: US renal core biopsy L    Pre-Procedure Diagnosis:  Renal insufficiency    Post-Procedure Diagnosis: Same    Anesthesia:  Local and Sedation    Findings:  2 x 18g core of L kidney    Specimens: As above    Blood Loss:  Minimal    Tourniquet Time: None  Complications:  None  Drains:  None    Secondary Diagnosis:  none    Flaca Oakes MD  5/3/2022

## 2022-05-04 ENCOUNTER — TELEPHONE (OUTPATIENT)
Dept: NEPHROLOGY | Facility: CLINIC | Age: 77
End: 2022-05-04

## 2022-05-04 NOTE — TELEPHONE ENCOUNTER
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  Will plan to discuss w/ oncology service and if OK from their end, treat w/ short course of steroids

## 2022-05-05 ENCOUNTER — TELEPHONE (OUTPATIENT)
Dept: HEMATOLOGY/ONCOLOGY | Age: 77
End: 2022-05-05

## 2022-05-05 ENCOUNTER — MED REC SCAN ONLY (OUTPATIENT)
Dept: NEPHROLOGY | Facility: CLINIC | Age: 77
End: 2022-05-05

## 2022-05-05 ENCOUNTER — TELEPHONE (OUTPATIENT)
Dept: NEPHROLOGY | Facility: CLINIC | Age: 77
End: 2022-05-05

## 2022-05-05 RX ORDER — PREDNISONE 20 MG/1
TABLET ORAL
Qty: 120 TABLET | Refills: 0 | Status: SHIPPED | OUTPATIENT
Start: 2022-05-05

## 2022-05-05 NOTE — TELEPHONE ENCOUNTER
Biopsy findings reviewed  Oncology Ok w/ steroid treatment   Care discussed w/ patient and plan to start on prednisone 80 mg daily    Labs weekly - BMP/CBC/urine pr/cr ratio/UA    Will plan to taper steroids over course of next 4 wks depending on respose

## 2022-05-05 NOTE — TELEPHONE ENCOUNTER
Spoke to Brian and went over kidney biopsy results. Concurred with Renal recommendations regarding steroids. He is seeing Endo as well as he is diabetic and knows steroids can increase his glucose levels.  I will see him in 2 weeks

## 2022-05-05 NOTE — TELEPHONE ENCOUNTER
Patient states that he had a Kidney Biopsy last week and he need to discuss the results with Dr. Enrique Michel .

## 2022-05-10 ENCOUNTER — APPOINTMENT (OUTPATIENT)
Dept: HEMATOLOGY/ONCOLOGY | Age: 77
End: 2022-05-10
Attending: INTERNAL MEDICINE
Payer: MEDICARE

## 2022-05-10 ENCOUNTER — LAB ENCOUNTER (OUTPATIENT)
Dept: LAB | Age: 77
End: 2022-05-10
Attending: INTERNAL MEDICINE
Payer: MEDICARE

## 2022-05-10 DIAGNOSIS — R89.7 PODOCYTE FOOT PROCESS EFFACEMENT PRESENT ON ELECTRON MICROSCOPY OF RENAL BIOPSY: ICD-10-CM

## 2022-05-10 DIAGNOSIS — R80.9 NEPHROTIC RANGE PROTEINURIA: ICD-10-CM

## 2022-05-10 LAB
ANION GAP SERPL CALC-SCNC: 5 MMOL/L (ref 0–18)
BASOPHILS # BLD AUTO: 0.01 X10(3) UL (ref 0–0.2)
BASOPHILS NFR BLD AUTO: 0.1 %
BILIRUB UR QL STRIP.AUTO: NEGATIVE
BUN BLD-MCNC: 39 MG/DL (ref 7–18)
CALCIUM BLD-MCNC: 9.3 MG/DL (ref 8.5–10.1)
CHLORIDE SERPL-SCNC: 106 MMOL/L (ref 98–112)
CO2 SERPL-SCNC: 27 MMOL/L (ref 21–32)
COLOR UR AUTO: YELLOW
CREAT BLD-MCNC: 1.44 MG/DL
CREAT UR-SCNC: 70.9 MG/DL
EOSINOPHIL # BLD AUTO: 0 X10(3) UL (ref 0–0.7)
EOSINOPHIL NFR BLD AUTO: 0 %
ERYTHROCYTE [DISTWIDTH] IN BLOOD BY AUTOMATED COUNT: 15.3 %
FASTING STATUS PATIENT QL REPORTED: NO
GLUCOSE BLD-MCNC: 157 MG/DL (ref 70–99)
GLUCOSE UR STRIP.AUTO-MCNC: 150 MG/DL
HCT VFR BLD AUTO: 38.5 %
HGB BLD-MCNC: 12.4 G/DL
HYALINE CASTS #/AREA URNS AUTO: PRESENT /LPF
IMM GRANULOCYTES # BLD AUTO: 0.05 X10(3) UL (ref 0–1)
IMM GRANULOCYTES NFR BLD: 0.4 %
KETONES UR STRIP.AUTO-MCNC: NEGATIVE MG/DL
LYMPHOCYTES # BLD AUTO: 4.38 X10(3) UL (ref 1–4)
LYMPHOCYTES NFR BLD AUTO: 31.9 %
MCH RBC QN AUTO: 28.8 PG (ref 26–34)
MCHC RBC AUTO-ENTMCNC: 32.2 G/DL (ref 31–37)
MCV RBC AUTO: 89.5 FL
MONOCYTES # BLD AUTO: 0.56 X10(3) UL (ref 0.1–1)
MONOCYTES NFR BLD AUTO: 4.1 %
NEUTROPHILS # BLD AUTO: 8.75 X10 (3) UL (ref 1.5–7.7)
NEUTROPHILS # BLD AUTO: 8.75 X10(3) UL (ref 1.5–7.7)
NEUTROPHILS NFR BLD AUTO: 63.5 %
NITRITE UR QL STRIP.AUTO: NEGATIVE
OSMOLALITY SERPL CALC.SUM OF ELEC: 299 MOSM/KG (ref 275–295)
PH UR STRIP.AUTO: 5 [PH] (ref 5–8)
PLATELET # BLD AUTO: 335 10(3)UL (ref 150–450)
POTASSIUM SERPL-SCNC: 4.1 MMOL/L (ref 3.5–5.1)
PROT UR STRIP.AUTO-MCNC: 100 MG/DL
PROT UR-MCNC: 311.3 MG/DL
RBC # BLD AUTO: 4.3 X10(6)UL
SODIUM SERPL-SCNC: 138 MMOL/L (ref 136–145)
SP GR UR STRIP.AUTO: 1.01 (ref 1–1.03)
UROBILINOGEN UR STRIP.AUTO-MCNC: <2 MG/DL
WBC # BLD AUTO: 13.8 X10(3) UL (ref 4–11)

## 2022-05-10 PROCEDURE — 80048 BASIC METABOLIC PNL TOTAL CA: CPT

## 2022-05-10 PROCEDURE — 82570 ASSAY OF URINE CREATININE: CPT

## 2022-05-10 PROCEDURE — 81001 URINALYSIS AUTO W/SCOPE: CPT

## 2022-05-10 PROCEDURE — 84156 ASSAY OF PROTEIN URINE: CPT

## 2022-05-10 PROCEDURE — 85025 COMPLETE CBC W/AUTO DIFF WBC: CPT

## 2022-05-10 PROCEDURE — 36415 COLL VENOUS BLD VENIPUNCTURE: CPT

## 2022-05-11 ENCOUNTER — TELEPHONE (OUTPATIENT)
Dept: NEPHROLOGY | Facility: CLINIC | Age: 77
End: 2022-05-11

## 2022-05-12 ENCOUNTER — TELEPHONE (OUTPATIENT)
Dept: NEPHROLOGY | Facility: CLINIC | Age: 77
End: 2022-05-12

## 2022-05-12 NOTE — TELEPHONE ENCOUNTER
Pt requesting to speak with Dr Veryl Primrose regarding his test results from 5/10.      Confirmed [phone] as best call back number,

## 2022-05-17 ENCOUNTER — TELEPHONE (OUTPATIENT)
Dept: NEPHROLOGY | Facility: CLINIC | Age: 77
End: 2022-05-17

## 2022-05-17 ENCOUNTER — LAB ENCOUNTER (OUTPATIENT)
Dept: LAB | Age: 77
End: 2022-05-17
Attending: INTERNAL MEDICINE
Payer: MEDICARE

## 2022-05-17 DIAGNOSIS — R80.9 PROTEINURIA, UNSPECIFIED TYPE: ICD-10-CM

## 2022-05-17 LAB
ANION GAP SERPL CALC-SCNC: 5 MMOL/L (ref 0–18)
BASOPHILS # BLD AUTO: 0.01 X10(3) UL (ref 0–0.2)
BASOPHILS NFR BLD AUTO: 0.1 %
BUN BLD-MCNC: 33 MG/DL (ref 7–18)
CALCIUM BLD-MCNC: 8.4 MG/DL (ref 8.5–10.1)
CHLORIDE SERPL-SCNC: 102 MMOL/L (ref 98–112)
CO2 SERPL-SCNC: 28 MMOL/L (ref 21–32)
CREAT BLD-MCNC: 1.25 MG/DL
CREAT UR-SCNC: 91.2 MG/DL
EOSINOPHIL # BLD AUTO: 0 X10(3) UL (ref 0–0.7)
EOSINOPHIL NFR BLD AUTO: 0 %
ERYTHROCYTE [DISTWIDTH] IN BLOOD BY AUTOMATED COUNT: 15.8 %
FASTING STATUS PATIENT QL REPORTED: YES
GLUCOSE BLD-MCNC: 220 MG/DL (ref 70–99)
HCT VFR BLD AUTO: 37.5 %
HGB BLD-MCNC: 12.2 G/DL
IMM GRANULOCYTES # BLD AUTO: 0.03 X10(3) UL (ref 0–1)
IMM GRANULOCYTES NFR BLD: 0.2 %
LYMPHOCYTES # BLD AUTO: 5.2 X10(3) UL (ref 1–4)
LYMPHOCYTES NFR BLD AUTO: 36.7 %
MAGNESIUM SERPL-MCNC: 2.2 MG/DL (ref 1.6–2.6)
MCH RBC QN AUTO: 29 PG (ref 26–34)
MCHC RBC AUTO-ENTMCNC: 32.5 G/DL (ref 31–37)
MCV RBC AUTO: 89.1 FL
MONOCYTES # BLD AUTO: 0.26 X10(3) UL (ref 0.1–1)
MONOCYTES NFR BLD AUTO: 1.8 %
NEUTROPHILS # BLD AUTO: 8.67 X10 (3) UL (ref 1.5–7.7)
NEUTROPHILS # BLD AUTO: 8.67 X10(3) UL (ref 1.5–7.7)
NEUTROPHILS NFR BLD AUTO: 61.2 %
OSMOLALITY SERPL CALC.SUM OF ELEC: 294 MOSM/KG (ref 275–295)
PHOSPHATE SERPL-MCNC: 4 MG/DL (ref 2.5–4.9)
PLATELET # BLD AUTO: 339 10(3)UL (ref 150–450)
POTASSIUM SERPL-SCNC: 4.6 MMOL/L (ref 3.5–5.1)
PROT UR-MCNC: 468.7 MG/DL
RBC # BLD AUTO: 4.21 X10(6)UL
SODIUM SERPL-SCNC: 135 MMOL/L (ref 136–145)
WBC # BLD AUTO: 14.2 X10(3) UL (ref 4–11)

## 2022-05-17 PROCEDURE — 82570 ASSAY OF URINE CREATININE: CPT

## 2022-05-17 PROCEDURE — 36415 COLL VENOUS BLD VENIPUNCTURE: CPT

## 2022-05-17 PROCEDURE — 85025 COMPLETE CBC W/AUTO DIFF WBC: CPT

## 2022-05-17 PROCEDURE — 83735 ASSAY OF MAGNESIUM: CPT

## 2022-05-17 PROCEDURE — 84100 ASSAY OF PHOSPHORUS: CPT

## 2022-05-17 PROCEDURE — 80048 BASIC METABOLIC PNL TOTAL CA: CPT

## 2022-05-17 PROCEDURE — 84156 ASSAY OF PROTEIN URINE: CPT

## 2022-05-18 ENCOUNTER — TELEPHONE (OUTPATIENT)
Dept: NEPHROLOGY | Facility: CLINIC | Age: 77
End: 2022-05-18

## 2022-05-18 DIAGNOSIS — R80.9 NEPHROTIC RANGE PROTEINURIA: ICD-10-CM

## 2022-05-18 DIAGNOSIS — R80.9 PROTEINURIA, UNSPECIFIED TYPE: Primary | ICD-10-CM

## 2022-05-18 DIAGNOSIS — R89.7 PODOCYTE FOOT PROCESS EFFACEMENT PRESENT ON ELECTRON MICROSCOPY OF RENAL BIOPSY: ICD-10-CM

## 2022-05-18 RX ORDER — SULFAMETHOXAZOLE AND TRIMETHOPRIM 800; 160 MG/1; MG/1
1 TABLET ORAL
Qty: 15 TABLET | Refills: 0 | Status: SHIPPED | OUTPATIENT
Start: 2022-05-18

## 2022-05-19 ENCOUNTER — MED REC SCAN ONLY (OUTPATIENT)
Dept: NEPHROLOGY | Facility: CLINIC | Age: 77
End: 2022-05-19

## 2022-05-24 ENCOUNTER — APPOINTMENT (OUTPATIENT)
Dept: HEMATOLOGY/ONCOLOGY | Age: 77
End: 2022-05-24
Attending: INTERNAL MEDICINE
Payer: MEDICARE

## 2022-05-24 ENCOUNTER — OFFICE VISIT (OUTPATIENT)
Dept: HEMATOLOGY/ONCOLOGY | Age: 77
End: 2022-05-24
Attending: INTERNAL MEDICINE
Payer: MEDICARE

## 2022-05-24 VITALS
OXYGEN SATURATION: 98 % | BODY MASS INDEX: 28.88 KG/M2 | SYSTOLIC BLOOD PRESSURE: 162 MMHG | HEART RATE: 60 BPM | TEMPERATURE: 97 F | HEIGHT: 69.02 IN | WEIGHT: 195 LBS | DIASTOLIC BLOOD PRESSURE: 79 MMHG

## 2022-05-24 DIAGNOSIS — C34.81 MALIGNANT NEOPLASM OF OVERLAPPING SITES OF RIGHT LUNG (HCC): Primary | ICD-10-CM

## 2022-05-24 DIAGNOSIS — Z86.711 HISTORY OF PULMONARY EMBOLISM: ICD-10-CM

## 2022-05-24 DIAGNOSIS — R59.9 ADENOPATHY: ICD-10-CM

## 2022-05-24 DIAGNOSIS — T88.7XXA MEDICATION SIDE EFFECTS: ICD-10-CM

## 2022-05-24 DIAGNOSIS — M79.89 LEG SWELLING: ICD-10-CM

## 2022-05-24 DIAGNOSIS — R80.8 OTHER PROTEINURIA: ICD-10-CM

## 2022-05-24 DIAGNOSIS — R80.9 PROTEINURIA, UNSPECIFIED TYPE: ICD-10-CM

## 2022-05-24 DIAGNOSIS — C78.7 LIVER METASTASES (HCC): ICD-10-CM

## 2022-05-24 LAB
ALBUMIN SERPL-MCNC: 2.1 G/DL (ref 3.4–5)
ALBUMIN/GLOB SERPL: 0.6 {RATIO} (ref 1–2)
ALP LIVER SERPL-CCNC: 88 U/L
ALT SERPL-CCNC: 20 U/L
ANION GAP SERPL CALC-SCNC: 6 MMOL/L (ref 0–18)
AST SERPL-CCNC: 16 U/L (ref 15–37)
BASOPHILS # BLD AUTO: 0.01 X10(3) UL (ref 0–0.2)
BASOPHILS NFR BLD AUTO: 0.1 %
BILIRUB SERPL-MCNC: 0.3 MG/DL (ref 0.1–2)
BILIRUB UR QL STRIP.AUTO: NEGATIVE
BUN BLD-MCNC: 32 MG/DL (ref 7–18)
CALCIUM BLD-MCNC: 8.8 MG/DL (ref 8.5–10.1)
CEA SERPL-MCNC: 5.4 NG/ML (ref ?–5)
CHLORIDE SERPL-SCNC: 101 MMOL/L (ref 98–112)
CLARITY UR REFRACT.AUTO: CLEAR
CO2 SERPL-SCNC: 29 MMOL/L (ref 21–32)
COLOR UR AUTO: YELLOW
CREAT BLD-MCNC: 1.28 MG/DL
CREAT UR-SCNC: 67.5 MG/DL
EOSINOPHIL # BLD AUTO: 0.01 X10(3) UL (ref 0–0.7)
EOSINOPHIL NFR BLD AUTO: 0.1 %
ERYTHROCYTE [DISTWIDTH] IN BLOOD BY AUTOMATED COUNT: 15.8 %
FASTING STATUS PATIENT QL REPORTED: NO
GLOBULIN PLAS-MCNC: 3.8 G/DL (ref 2.8–4.4)
GLUCOSE BLD-MCNC: 136 MG/DL (ref 70–99)
GLUCOSE UR STRIP.AUTO-MCNC: NEGATIVE MG/DL
HCT VFR BLD AUTO: 38.5 %
HGB BLD-MCNC: 12.4 G/DL
IMM GRANULOCYTES # BLD AUTO: 0.03 X10(3) UL (ref 0–1)
IMM GRANULOCYTES NFR BLD: 0.2 %
KETONES UR STRIP.AUTO-MCNC: NEGATIVE MG/DL
LDH SERPL L TO P-CCNC: 210 U/L
LEUKOCYTE ESTERASE UR QL STRIP.AUTO: NEGATIVE
LYMPHOCYTES # BLD AUTO: 5.14 X10(3) UL (ref 1–4)
LYMPHOCYTES NFR BLD AUTO: 40.8 %
MCH RBC QN AUTO: 28.6 PG (ref 26–34)
MCHC RBC AUTO-ENTMCNC: 32.2 G/DL (ref 31–37)
MCV RBC AUTO: 88.7 FL
MONOCYTES # BLD AUTO: 0.28 X10(3) UL (ref 0.1–1)
MONOCYTES NFR BLD AUTO: 2.2 %
NEUTROPHILS # BLD AUTO: 7.12 X10 (3) UL (ref 1.5–7.7)
NEUTROPHILS # BLD AUTO: 7.12 X10(3) UL (ref 1.5–7.7)
NEUTROPHILS NFR BLD AUTO: 56.6 %
NITRITE UR QL STRIP.AUTO: NEGATIVE
OSMOLALITY SERPL CALC.SUM OF ELEC: 291 MOSM/KG (ref 275–295)
PH UR STRIP.AUTO: 6 [PH] (ref 5–8)
PLATELET # BLD AUTO: 254 10(3)UL (ref 150–450)
POTASSIUM SERPL-SCNC: 5 MMOL/L (ref 3.5–5.1)
PROT SERPL-MCNC: 5.9 G/DL (ref 6.4–8.2)
PROT UR STRIP.AUTO-MCNC: >=300 MG/DL
PROT UR-MCNC: 659.2 MG/DL
RBC # BLD AUTO: 4.34 X10(6)UL
SODIUM SERPL-SCNC: 136 MMOL/L (ref 136–145)
SP GR UR STRIP.AUTO: 1.02 (ref 1–1.03)
UROBILINOGEN UR STRIP.AUTO-MCNC: 0.2 MG/DL
WBC # BLD AUTO: 12.6 X10(3) UL (ref 4–11)

## 2022-05-24 PROCEDURE — 99215 OFFICE O/P EST HI 40 MIN: CPT | Performed by: INTERNAL MEDICINE

## 2022-05-24 NOTE — PROGRESS NOTES
Pt here for 1 month MD f/u. Energy level is lower, same as previously. Appetite is low, but pushes himself to eat. Pt notes pain in legs if up too much. SOB with exertion. Cough has subsided the last week. No bowel issues. Pt notes BLE swelling is worse, same throughout the day. Seeing pain MD, will have MRI soon.      Outpatient Oncology Care Plan  Problem list:  fatigue  knowledge deficit    Problems related to:    disease/disease progression    Interventions:  provided general teaching    Expected outcomes:  safe in environment  symptoms relieved/minimized  understands plan of care    Progress towards outcome:  making progress    Education Record    Learner:  Patient and Spouse  Barriers / Limitations:  None  Method:  Reinforcement  Outcome:  Shows understanding  Comments:

## 2022-05-31 ENCOUNTER — TELEPHONE (OUTPATIENT)
Dept: HEMATOLOGY/ONCOLOGY | Facility: HOSPITAL | Age: 77
End: 2022-05-31

## 2022-05-31 ENCOUNTER — TELEPHONE (OUTPATIENT)
Dept: NEPHROLOGY | Facility: CLINIC | Age: 77
End: 2022-05-31

## 2022-05-31 ENCOUNTER — LAB ENCOUNTER (OUTPATIENT)
Dept: LAB | Age: 77
End: 2022-05-31
Attending: INTERNAL MEDICINE
Payer: MEDICARE

## 2022-05-31 DIAGNOSIS — R80.9 NEPHROTIC RANGE PROTEINURIA: ICD-10-CM

## 2022-05-31 DIAGNOSIS — R80.9 PROTEINURIA, UNSPECIFIED TYPE: ICD-10-CM

## 2022-05-31 DIAGNOSIS — R80.9 PROTEINURIA, UNSPECIFIED TYPE: Primary | ICD-10-CM

## 2022-05-31 DIAGNOSIS — R89.7 PODOCYTE FOOT PROCESS EFFACEMENT PRESENT ON ELECTRON MICROSCOPY OF RENAL BIOPSY: ICD-10-CM

## 2022-05-31 LAB
ANION GAP SERPL CALC-SCNC: 4 MMOL/L (ref 0–18)
BASOPHILS # BLD AUTO: 0.01 X10(3) UL (ref 0–0.2)
BASOPHILS NFR BLD AUTO: 0.1 %
BUN BLD-MCNC: 27 MG/DL (ref 7–18)
CALCIUM BLD-MCNC: 8.3 MG/DL (ref 8.5–10.1)
CHLORIDE SERPL-SCNC: 105 MMOL/L (ref 98–112)
CO2 SERPL-SCNC: 29 MMOL/L (ref 21–32)
CREAT BLD-MCNC: 1.25 MG/DL
CREAT UR-SCNC: 110 MG/DL
EOSINOPHIL # BLD AUTO: 0.08 X10(3) UL (ref 0–0.7)
EOSINOPHIL NFR BLD AUTO: 0.8 %
ERYTHROCYTE [DISTWIDTH] IN BLOOD BY AUTOMATED COUNT: 15.6 %
FASTING STATUS PATIENT QL REPORTED: NO
GLUCOSE BLD-MCNC: 86 MG/DL (ref 70–99)
HCT VFR BLD AUTO: 35.4 %
HGB BLD-MCNC: 11.2 G/DL
IMM GRANULOCYTES # BLD AUTO: 0.02 X10(3) UL (ref 0–1)
IMM GRANULOCYTES NFR BLD: 0.2 %
LYMPHOCYTES # BLD AUTO: 5.16 X10(3) UL (ref 1–4)
LYMPHOCYTES NFR BLD AUTO: 51.3 %
MAGNESIUM SERPL-MCNC: 2 MG/DL (ref 1.6–2.6)
MCH RBC QN AUTO: 28.7 PG (ref 26–34)
MCHC RBC AUTO-ENTMCNC: 31.6 G/DL (ref 31–37)
MCV RBC AUTO: 90.8 FL
MONOCYTES # BLD AUTO: 0.29 X10(3) UL (ref 0.1–1)
MONOCYTES NFR BLD AUTO: 2.9 %
NEUTROPHILS # BLD AUTO: 4.49 X10 (3) UL (ref 1.5–7.7)
NEUTROPHILS # BLD AUTO: 4.49 X10(3) UL (ref 1.5–7.7)
NEUTROPHILS NFR BLD AUTO: 44.7 %
OSMOLALITY SERPL CALC.SUM OF ELEC: 290 MOSM/KG (ref 275–295)
PLATELET # BLD AUTO: 207 10(3)UL (ref 150–450)
POTASSIUM SERPL-SCNC: 4.4 MMOL/L (ref 3.5–5.1)
PROT UR-MCNC: 875.2 MG/DL
RBC # BLD AUTO: 3.9 X10(6)UL
SODIUM SERPL-SCNC: 138 MMOL/L (ref 136–145)
WBC # BLD AUTO: 10.1 X10(3) UL (ref 4–11)

## 2022-05-31 PROCEDURE — 82570 ASSAY OF URINE CREATININE: CPT

## 2022-05-31 PROCEDURE — 84156 ASSAY OF PROTEIN URINE: CPT

## 2022-05-31 PROCEDURE — 83735 ASSAY OF MAGNESIUM: CPT

## 2022-05-31 PROCEDURE — 36415 COLL VENOUS BLD VENIPUNCTURE: CPT

## 2022-05-31 PROCEDURE — 80048 BASIC METABOLIC PNL TOTAL CA: CPT

## 2022-05-31 PROCEDURE — 85025 COMPLETE CBC W/AUTO DIFF WBC: CPT

## 2022-05-31 NOTE — TELEPHONE ENCOUNTER
Patient called and was trying to schedule a follow up on 6/15/22 after his pet scan. He also wants an infusion. I do not know how long that would be nor do I schedule them. Please call patient.

## 2022-06-01 DIAGNOSIS — R80.9 NEPHROTIC RANGE PROTEINURIA: ICD-10-CM

## 2022-06-01 DIAGNOSIS — R89.7 PODOCYTE FOOT PROCESS EFFACEMENT PRESENT ON ELECTRON MICROSCOPY OF RENAL BIOPSY: ICD-10-CM

## 2022-06-01 RX ORDER — PREDNISONE 20 MG/1
40 TABLET ORAL DAILY
Qty: 180 TABLET | Refills: 1 | Status: SHIPPED | OUTPATIENT
Start: 2022-06-01

## 2022-06-02 ENCOUNTER — TELEPHONE (OUTPATIENT)
Dept: NEPHROLOGY | Facility: CLINIC | Age: 77
End: 2022-06-02

## 2022-06-02 NOTE — TELEPHONE ENCOUNTER
Patients wife Gema Lawrence calling. Stated she had called this past Tuesday  To get a F/U appointment  On 6/14-  As patient is having a CT on 6/13/2022. Stated she has not has a return call. There is not an open slot to schedule.     Please advise No

## 2022-06-06 RX ORDER — CITALOPRAM 20 MG/1
20 TABLET ORAL DAILY
Qty: 90 TABLET | Refills: 1 | Status: SHIPPED | OUTPATIENT
Start: 2022-06-06

## 2022-06-06 RX ORDER — CITALOPRAM 20 MG/1
TABLET ORAL
Qty: 90 TABLET | Refills: 1 | OUTPATIENT
Start: 2022-06-06

## 2022-06-07 ENCOUNTER — LAB ENCOUNTER (OUTPATIENT)
Dept: LAB | Age: 77
End: 2022-06-07
Attending: INTERNAL MEDICINE
Payer: MEDICARE

## 2022-06-07 DIAGNOSIS — R80.9 NEPHROTIC RANGE PROTEINURIA: ICD-10-CM

## 2022-06-07 DIAGNOSIS — R89.7 PODOCYTE FOOT PROCESS EFFACEMENT PRESENT ON ELECTRON MICROSCOPY OF RENAL BIOPSY: ICD-10-CM

## 2022-06-07 DIAGNOSIS — E78.00 PURE HYPERCHOLESTEROLEMIA: Primary | ICD-10-CM

## 2022-06-07 LAB
ALBUMIN SERPL-MCNC: 1.9 G/DL (ref 3.4–5)
ALBUMIN/GLOB SERPL: 0.6 {RATIO} (ref 1–2)
ALP LIVER SERPL-CCNC: 88 U/L
ALT SERPL-CCNC: 20 U/L
ANION GAP SERPL CALC-SCNC: 5 MMOL/L (ref 0–18)
AST SERPL-CCNC: 18 U/L (ref 15–37)
BILIRUB SERPL-MCNC: 0.2 MG/DL (ref 0.1–2)
BUN BLD-MCNC: 34 MG/DL (ref 7–18)
CALCIUM BLD-MCNC: 8.3 MG/DL (ref 8.5–10.1)
CHLORIDE SERPL-SCNC: 106 MMOL/L (ref 98–112)
CO2 SERPL-SCNC: 24 MMOL/L (ref 21–32)
CREAT BLD-MCNC: 1.32 MG/DL
CREAT UR-SCNC: 77.5 MG/DL
FASTING STATUS PATIENT QL REPORTED: NO
GLOBULIN PLAS-MCNC: 3.2 G/DL (ref 2.8–4.4)
GLUCOSE BLD-MCNC: 195 MG/DL (ref 70–99)
OSMOLALITY SERPL CALC.SUM OF ELEC: 293 MOSM/KG (ref 275–295)
POTASSIUM SERPL-SCNC: 4.7 MMOL/L (ref 3.5–5.1)
PROT SERPL-MCNC: 5.1 G/DL (ref 6.4–8.2)
SODIUM SERPL-SCNC: 135 MMOL/L (ref 136–145)

## 2022-06-07 PROCEDURE — 80053 COMPREHEN METABOLIC PANEL: CPT

## 2022-06-07 PROCEDURE — 36415 COLL VENOUS BLD VENIPUNCTURE: CPT

## 2022-06-07 PROCEDURE — 82570 ASSAY OF URINE CREATININE: CPT

## 2022-06-10 DIAGNOSIS — R80.9 PROTEINURIA, UNSPECIFIED TYPE: Primary | ICD-10-CM

## 2022-06-10 RX ORDER — SULFAMETHOXAZOLE AND TRIMETHOPRIM 800; 160 MG/1; MG/1
1 TABLET ORAL
Qty: 15 TABLET | Refills: 1 | Status: SHIPPED | OUTPATIENT
Start: 2022-06-10

## 2022-06-13 ENCOUNTER — HOSPITAL ENCOUNTER (OUTPATIENT)
Dept: CT IMAGING | Facility: HOSPITAL | Age: 77
Discharge: HOME | End: 2022-06-13
Attending: INTERNAL MEDICINE
Payer: MEDICARE

## 2022-06-13 DIAGNOSIS — C34.81 MALIGNANT NEOPLASM OF OVERLAPPING SITES OF RIGHT LUNG (HCC): ICD-10-CM

## 2022-06-13 PROCEDURE — 71260 CT THORAX DX C+: CPT | Performed by: INTERNAL MEDICINE

## 2022-06-13 PROCEDURE — 74177 CT ABD & PELVIS W/CONTRAST: CPT | Performed by: INTERNAL MEDICINE

## 2022-06-13 RX ORDER — IOHEXOL 350 MG/ML
100 INJECTION, SOLUTION INTRAVENOUS
Status: COMPLETED | OUTPATIENT
Start: 2022-06-13 | End: 2022-06-13

## 2022-06-13 RX ADMIN — IOHEXOL 100 ML: 350 INJECTION, SOLUTION INTRAVENOUS at 13:51:00

## 2022-06-14 ENCOUNTER — OFFICE VISIT (OUTPATIENT)
Dept: HEMATOLOGY/ONCOLOGY | Age: 77
End: 2022-06-14
Attending: INTERNAL MEDICINE
Payer: MEDICARE

## 2022-06-14 VITALS
SYSTOLIC BLOOD PRESSURE: 157 MMHG | WEIGHT: 201.5 LBS | HEIGHT: 69.02 IN | DIASTOLIC BLOOD PRESSURE: 74 MMHG | BODY MASS INDEX: 29.84 KG/M2 | HEART RATE: 70 BPM | OXYGEN SATURATION: 96 % | TEMPERATURE: 99 F

## 2022-06-14 DIAGNOSIS — R89.7 PODOCYTE FOOT PROCESS EFFACEMENT PRESENT ON ELECTRON MICROSCOPY OF RENAL BIOPSY: ICD-10-CM

## 2022-06-14 DIAGNOSIS — T88.7XXA MEDICATION SIDE EFFECTS: ICD-10-CM

## 2022-06-14 DIAGNOSIS — Z86.711 HISTORY OF PULMONARY EMBOLISM: ICD-10-CM

## 2022-06-14 DIAGNOSIS — C78.7 LIVER METASTASES (HCC): Primary | ICD-10-CM

## 2022-06-14 DIAGNOSIS — C34.81 MALIGNANT NEOPLASM OF OVERLAPPING SITES OF RIGHT LUNG (HCC): ICD-10-CM

## 2022-06-14 DIAGNOSIS — R80.9 PROTEINURIA, UNSPECIFIED TYPE: ICD-10-CM

## 2022-06-14 DIAGNOSIS — M79.89 LEG SWELLING: ICD-10-CM

## 2022-06-14 DIAGNOSIS — R59.9 ADENOPATHY: ICD-10-CM

## 2022-06-14 DIAGNOSIS — R80.9 NEPHROTIC RANGE PROTEINURIA: ICD-10-CM

## 2022-06-14 LAB
ALBUMIN SERPL-MCNC: 2.1 G/DL (ref 3.4–5)
ALBUMIN/GLOB SERPL: 0.7 {RATIO} (ref 1–2)
ALP LIVER SERPL-CCNC: 94 U/L
ALT SERPL-CCNC: 21 U/L
ANION GAP SERPL CALC-SCNC: 5 MMOL/L (ref 0–18)
AST SERPL-CCNC: 21 U/L (ref 15–37)
BASOPHILS # BLD AUTO: 0.02 X10(3) UL (ref 0–0.2)
BASOPHILS NFR BLD AUTO: 0.1 %
BILIRUB SERPL-MCNC: 0.2 MG/DL (ref 0.1–2)
BUN BLD-MCNC: 35 MG/DL (ref 7–18)
CALCIUM BLD-MCNC: 8.4 MG/DL (ref 8.5–10.1)
CEA SERPL-MCNC: 5.3 NG/ML (ref ?–5)
CHLORIDE SERPL-SCNC: 105 MMOL/L (ref 98–112)
CO2 SERPL-SCNC: 29 MMOL/L (ref 21–32)
CREAT BLD-MCNC: 1.41 MG/DL
CREAT UR-SCNC: 51.1 MG/DL
EOSINOPHIL # BLD AUTO: 0.02 X10(3) UL (ref 0–0.7)
EOSINOPHIL NFR BLD AUTO: 0.1 %
ERYTHROCYTE [DISTWIDTH] IN BLOOD BY AUTOMATED COUNT: 16.5 %
FASTING STATUS PATIENT QL REPORTED: NO
GLOBULIN PLAS-MCNC: 3.1 G/DL (ref 2.8–4.4)
GLUCOSE BLD-MCNC: 141 MG/DL (ref 70–99)
HCT VFR BLD AUTO: 36 %
HGB BLD-MCNC: 11.6 G/DL
IMM GRANULOCYTES # BLD AUTO: 0.06 X10(3) UL (ref 0–1)
IMM GRANULOCYTES NFR BLD: 0.4 %
LDH SERPL L TO P-CCNC: 226 U/L
LYMPHOCYTES # BLD AUTO: 5.98 X10(3) UL (ref 1–4)
LYMPHOCYTES NFR BLD AUTO: 44.7 %
MCH RBC QN AUTO: 29.2 PG (ref 26–34)
MCHC RBC AUTO-ENTMCNC: 32.2 G/DL (ref 31–37)
MCV RBC AUTO: 90.7 FL
MONOCYTES # BLD AUTO: 0.26 X10(3) UL (ref 0.1–1)
MONOCYTES NFR BLD AUTO: 1.9 %
NEUTROPHILS # BLD AUTO: 7.03 X10 (3) UL (ref 1.5–7.7)
NEUTROPHILS # BLD AUTO: 7.03 X10(3) UL (ref 1.5–7.7)
NEUTROPHILS NFR BLD AUTO: 52.8 %
OSMOLALITY SERPL CALC.SUM OF ELEC: 298 MOSM/KG (ref 275–295)
PLATELET # BLD AUTO: 286 10(3)UL (ref 150–450)
POTASSIUM SERPL-SCNC: 5 MMOL/L (ref 3.5–5.1)
PROT SERPL-MCNC: 5.2 G/DL (ref 6.4–8.2)
PROT UR-MCNC: 237.7 MG/DL
RBC # BLD AUTO: 3.97 X10(6)UL
SODIUM SERPL-SCNC: 139 MMOL/L (ref 136–145)
WBC # BLD AUTO: 13.4 X10(3) UL (ref 4–11)

## 2022-06-14 PROCEDURE — 99215 OFFICE O/P EST HI 40 MIN: CPT | Performed by: INTERNAL MEDICINE

## 2022-06-14 NOTE — PROGRESS NOTES
Pt here for 3 week MD f/u. Pt had CT scan yesterday. Pt notes swelling in feet has increased and going up calf. Pt has swelling in shane hands and going up right forearm to elbow. Energy level is fair, appetite is good. Pt notes occasional pain in stomach and chest, no meds needed. Pt has SOB with exertion, same as previous.     Outpatient Oncology Care Plan  Problem list:  fatigue  knowledge deficit    Problems related to:    disease/disease progression    Interventions:  provided general teaching    Expected outcomes:  safe in environment  symptoms relieved/minimized  understands plan of care    Progress towards outcome:  making progress    Education Record    Learner:  Patient and Spouse  Barriers / Limitations:  None  Method:  Reinforcement  Outcome:  Shows understanding  Comments:

## 2022-06-21 ENCOUNTER — OFFICE VISIT (OUTPATIENT)
Dept: NEPHROLOGY | Facility: CLINIC | Age: 77
End: 2022-06-21
Payer: MEDICARE

## 2022-06-21 VITALS — SYSTOLIC BLOOD PRESSURE: 122 MMHG | BODY MASS INDEX: 30 KG/M2 | WEIGHT: 200 LBS | DIASTOLIC BLOOD PRESSURE: 68 MMHG

## 2022-06-21 DIAGNOSIS — R89.7 PODOCYTE FOOT PROCESS EFFACEMENT PRESENT ON ELECTRON MICROSCOPY OF BIOPSY OF KIDNEY: Primary | ICD-10-CM

## 2022-06-21 PROCEDURE — 99215 OFFICE O/P EST HI 40 MIN: CPT | Performed by: INTERNAL MEDICINE

## 2022-06-21 RX ORDER — GLIPIZIDE 5 MG/1
10 TABLET ORAL
COMMUNITY

## 2022-07-05 ENCOUNTER — TELEPHONE (OUTPATIENT)
Dept: NEPHROLOGY | Facility: CLINIC | Age: 77
End: 2022-07-05

## 2022-07-05 ENCOUNTER — LAB ENCOUNTER (OUTPATIENT)
Dept: LAB | Age: 77
End: 2022-07-05
Attending: INTERNAL MEDICINE
Payer: MEDICARE

## 2022-07-05 DIAGNOSIS — R89.7 PODOCYTE FOOT PROCESS EFFACEMENT PRESENT ON ELECTRON MICROSCOPY OF BIOPSY OF KIDNEY: ICD-10-CM

## 2022-07-05 DIAGNOSIS — N18.30 STAGE 3 CHRONIC KIDNEY DISEASE, UNSPECIFIED WHETHER STAGE 3A OR 3B CKD (HCC): ICD-10-CM

## 2022-07-05 DIAGNOSIS — N18.30 STAGE 3 CHRONIC KIDNEY DISEASE, UNSPECIFIED WHETHER STAGE 3A OR 3B CKD (HCC): Primary | ICD-10-CM

## 2022-07-05 LAB
ANION GAP SERPL CALC-SCNC: 5 MMOL/L (ref 0–18)
BASOPHILS # BLD AUTO: 0.02 X10(3) UL (ref 0–0.2)
BASOPHILS NFR BLD AUTO: 0.2 %
BILIRUB UR QL STRIP.AUTO: NEGATIVE
BUN BLD-MCNC: 40 MG/DL (ref 7–18)
CALCIUM BLD-MCNC: 8.4 MG/DL (ref 8.5–10.1)
CHLORIDE SERPL-SCNC: 106 MMOL/L (ref 98–112)
CO2 SERPL-SCNC: 28 MMOL/L (ref 21–32)
COLOR UR AUTO: YELLOW
CREAT BLD-MCNC: 1.46 MG/DL
CREAT UR-SCNC: 130 MG/DL
EOSINOPHIL # BLD AUTO: 0.04 X10(3) UL (ref 0–0.7)
EOSINOPHIL NFR BLD AUTO: 0.3 %
ERYTHROCYTE [DISTWIDTH] IN BLOOD BY AUTOMATED COUNT: 16.1 %
FASTING STATUS PATIENT QL REPORTED: NO
GLUCOSE BLD-MCNC: 227 MG/DL (ref 70–99)
GLUCOSE UR STRIP.AUTO-MCNC: 50 MG/DL
HCT VFR BLD AUTO: 35.4 %
HGB BLD-MCNC: 11.5 G/DL
HYALINE CASTS #/AREA URNS AUTO: PRESENT /LPF
IMM GRANULOCYTES # BLD AUTO: 0.05 X10(3) UL (ref 0–1)
IMM GRANULOCYTES NFR BLD: 0.4 %
KETONES UR STRIP.AUTO-MCNC: NEGATIVE MG/DL
LEUKOCYTE ESTERASE UR QL STRIP.AUTO: NEGATIVE
LYMPHOCYTES # BLD AUTO: 5.16 X10(3) UL (ref 1–4)
LYMPHOCYTES NFR BLD AUTO: 42.9 %
MAGNESIUM SERPL-MCNC: 2 MG/DL (ref 1.6–2.6)
MCH RBC QN AUTO: 30.2 PG (ref 26–34)
MCHC RBC AUTO-ENTMCNC: 32.5 G/DL (ref 31–37)
MCV RBC AUTO: 92.9 FL
MONOCYTES # BLD AUTO: 0.26 X10(3) UL (ref 0.1–1)
MONOCYTES NFR BLD AUTO: 2.2 %
MORPHOLOGY: NORMAL
NEUTROPHILS # BLD AUTO: 6.5 X10 (3) UL (ref 1.5–7.7)
NEUTROPHILS # BLD AUTO: 6.5 X10(3) UL (ref 1.5–7.7)
NEUTROPHILS NFR BLD AUTO: 54 %
NITRITE UR QL STRIP.AUTO: NEGATIVE
OSMOLALITY SERPL CALC.SUM OF ELEC: 305 MOSM/KG (ref 275–295)
PH UR STRIP.AUTO: 5 [PH] (ref 5–8)
PHOSPHATE SERPL-MCNC: 2.8 MG/DL (ref 2.5–4.9)
PLATELET # BLD AUTO: 263 10(3)UL (ref 150–450)
PLATELET MORPHOLOGY: NORMAL
POTASSIUM SERPL-SCNC: 5 MMOL/L (ref 3.5–5.1)
PROT UR STRIP.AUTO-MCNC: >=500 MG/DL
PROT UR-MCNC: 1046.7 MG/DL
RBC # BLD AUTO: 3.81 X10(6)UL
SODIUM SERPL-SCNC: 139 MMOL/L (ref 136–145)
SP GR UR STRIP.AUTO: 1.02 (ref 1–1.03)
UROBILINOGEN UR STRIP.AUTO-MCNC: <2 MG/DL
WBC # BLD AUTO: 12 X10(3) UL (ref 4–11)

## 2022-07-05 PROCEDURE — 82570 ASSAY OF URINE CREATININE: CPT

## 2022-07-05 PROCEDURE — 84100 ASSAY OF PHOSPHORUS: CPT

## 2022-07-05 PROCEDURE — 85025 COMPLETE CBC W/AUTO DIFF WBC: CPT

## 2022-07-05 PROCEDURE — 83735 ASSAY OF MAGNESIUM: CPT

## 2022-07-05 PROCEDURE — 80048 BASIC METABOLIC PNL TOTAL CA: CPT

## 2022-07-05 PROCEDURE — 36415 COLL VENOUS BLD VENIPUNCTURE: CPT

## 2022-07-05 PROCEDURE — 81001 URINALYSIS AUTO W/SCOPE: CPT

## 2022-07-05 PROCEDURE — 84156 ASSAY OF PROTEIN URINE: CPT

## 2022-07-05 NOTE — TELEPHONE ENCOUNTER
Increase torsmide to 40/20  Labs pending- will get done tomorrow  May need IV diruetics- will reach out to pt tomorrow after lab review  For now advised pt to elevate forearm

## 2022-07-05 NOTE — TELEPHONE ENCOUNTER
Patient stated that he is still swelling and today he said there is liquid coming out of his arm.     He would like to speak with Dr. Papito Samuel about this and if he should continue taking the sulfamethoxazole-trimethoprim DS

## 2022-07-06 ENCOUNTER — HOSPITAL ENCOUNTER (INPATIENT)
Facility: HOSPITAL | Age: 77
LOS: 3 days | Discharge: HOME | End: 2022-07-09
Attending: HOSPITALIST | Admitting: HOSPITALIST
Payer: MEDICARE

## 2022-07-06 DIAGNOSIS — N05.0 MINIMAL CHANGE DISEASE: Primary | ICD-10-CM

## 2022-07-06 DIAGNOSIS — R80.9 NEPHROTIC RANGE PROTEINURIA: ICD-10-CM

## 2022-07-06 DIAGNOSIS — R89.7 PODOCYTE FOOT PROCESS EFFACEMENT PRESENT ON ELECTRON MICROSCOPY OF RENAL BIOPSY: ICD-10-CM

## 2022-07-06 DIAGNOSIS — R60.1 ANASARCA: ICD-10-CM

## 2022-07-06 PROBLEM — E11.65 TYPE 2 DIABETES MELLITUS WITH HYPERGLYCEMIA, WITH LONG-TERM CURRENT USE OF INSULIN (HCC): Status: ACTIVE | Noted: 2021-09-20

## 2022-07-06 PROBLEM — Z79.4 TYPE 2 DIABETES MELLITUS WITH HYPERGLYCEMIA, WITH LONG-TERM CURRENT USE OF INSULIN (HCC): Status: ACTIVE | Noted: 2021-09-20

## 2022-07-06 LAB — GLUCOSE BLD-MCNC: 325 MG/DL (ref 70–99)

## 2022-07-06 PROCEDURE — 99223 1ST HOSP IP/OBS HIGH 75: CPT | Performed by: INTERNAL MEDICINE

## 2022-07-06 PROCEDURE — 99223 1ST HOSP IP/OBS HIGH 75: CPT | Performed by: HOSPITALIST

## 2022-07-06 RX ORDER — SULFAMETHOXAZOLE AND TRIMETHOPRIM 800; 160 MG/1; MG/1
1 TABLET ORAL
Status: DISCONTINUED | OUTPATIENT
Start: 2022-07-08 | End: 2022-07-09

## 2022-07-06 RX ORDER — POLYETHYLENE GLYCOL 3350 17 G/17G
17 POWDER, FOR SOLUTION ORAL DAILY PRN
Status: DISCONTINUED | OUTPATIENT
Start: 2022-07-06 | End: 2022-07-09

## 2022-07-06 RX ORDER — FOLIC ACID 1 MG/1
1 TABLET ORAL NIGHTLY
Status: DISCONTINUED | OUTPATIENT
Start: 2022-07-06 | End: 2022-07-09

## 2022-07-06 RX ORDER — NICOTINE POLACRILEX 4 MG
30 LOZENGE BUCCAL
Status: DISCONTINUED | OUTPATIENT
Start: 2022-07-06 | End: 2022-07-09

## 2022-07-06 RX ORDER — SODIUM PHOSPHATE, DIBASIC AND SODIUM PHOSPHATE, MONOBASIC 7; 19 G/133ML; G/133ML
1 ENEMA RECTAL ONCE AS NEEDED
Status: DISCONTINUED | OUTPATIENT
Start: 2022-07-06 | End: 2022-07-09

## 2022-07-06 RX ORDER — LOSARTAN POTASSIUM 25 MG/1
25 TABLET ORAL DAILY
Status: DISCONTINUED | OUTPATIENT
Start: 2022-07-07 | End: 2022-07-09

## 2022-07-06 RX ORDER — METOCLOPRAMIDE HYDROCHLORIDE 5 MG/ML
10 INJECTION INTRAMUSCULAR; INTRAVENOUS EVERY 8 HOURS PRN
Status: DISCONTINUED | OUTPATIENT
Start: 2022-07-06 | End: 2022-07-09

## 2022-07-06 RX ORDER — ALBUMIN (HUMAN) 12.5 G/50ML
25 SOLUTION INTRAVENOUS EVERY 8 HOURS
Status: DISCONTINUED | OUTPATIENT
Start: 2022-07-06 | End: 2022-07-08

## 2022-07-06 RX ORDER — ENOXAPARIN SODIUM 100 MG/ML
40 INJECTION SUBCUTANEOUS DAILY
Status: DISCONTINUED | OUTPATIENT
Start: 2022-07-07 | End: 2022-07-09

## 2022-07-06 RX ORDER — BISACODYL 10 MG
10 SUPPOSITORY, RECTAL RECTAL
Status: DISCONTINUED | OUTPATIENT
Start: 2022-07-06 | End: 2022-07-09

## 2022-07-06 RX ORDER — ACETAMINOPHEN 500 MG
500 TABLET ORAL EVERY 4 HOURS PRN
Status: DISCONTINUED | OUTPATIENT
Start: 2022-07-06 | End: 2022-07-09

## 2022-07-06 RX ORDER — DEXTROSE MONOHYDRATE 25 G/50ML
50 INJECTION, SOLUTION INTRAVENOUS
Status: DISCONTINUED | OUTPATIENT
Start: 2022-07-06 | End: 2022-07-09

## 2022-07-06 RX ORDER — ONDANSETRON 2 MG/ML
4 INJECTION INTRAMUSCULAR; INTRAVENOUS EVERY 6 HOURS PRN
Status: DISCONTINUED | OUTPATIENT
Start: 2022-07-06 | End: 2022-07-09

## 2022-07-06 RX ORDER — ESCITALOPRAM OXALATE 10 MG/1
10 TABLET ORAL DAILY
Status: DISCONTINUED | OUTPATIENT
Start: 2022-07-07 | End: 2022-07-09

## 2022-07-06 RX ORDER — ATORVASTATIN CALCIUM 10 MG/1
10 TABLET, FILM COATED ORAL NIGHTLY
Status: DISCONTINUED | OUTPATIENT
Start: 2022-07-06 | End: 2022-07-09

## 2022-07-06 RX ORDER — MELATONIN
3 NIGHTLY PRN
Status: DISCONTINUED | OUTPATIENT
Start: 2022-07-06 | End: 2022-07-09

## 2022-07-06 RX ORDER — PANTOPRAZOLE SODIUM 20 MG/1
20 TABLET, DELAYED RELEASE ORAL
Status: DISCONTINUED | OUTPATIENT
Start: 2022-07-07 | End: 2022-07-09

## 2022-07-06 RX ORDER — CLOPIDOGREL BISULFATE 75 MG/1
75 TABLET ORAL NIGHTLY
Status: DISCONTINUED | OUTPATIENT
Start: 2022-07-06 | End: 2022-07-09

## 2022-07-06 RX ORDER — NICOTINE POLACRILEX 4 MG
15 LOZENGE BUCCAL
Status: DISCONTINUED | OUTPATIENT
Start: 2022-07-06 | End: 2022-07-09

## 2022-07-06 RX ORDER — SENNOSIDES 8.6 MG
17.2 TABLET ORAL NIGHTLY PRN
Status: DISCONTINUED | OUTPATIENT
Start: 2022-07-06 | End: 2022-07-09

## 2022-07-06 NOTE — TELEPHONE ENCOUNTER
Patient and wife have called back several times today. They would like to speak to you about test results.

## 2022-07-07 LAB
ALBUMIN SERPL-MCNC: 2 G/DL (ref 3.4–5)
ALBUMIN/GLOB SERPL: 0.6 {RATIO} (ref 1–2)
ALP LIVER SERPL-CCNC: 74 U/L
ALT SERPL-CCNC: 17 U/L
ANION GAP SERPL CALC-SCNC: 6 MMOL/L (ref 0–18)
ANION GAP SERPL CALC-SCNC: 7 MMOL/L (ref 0–18)
AST SERPL-CCNC: 19 U/L (ref 15–37)
ATRIAL RATE: 63 BPM
BASOPHILS # BLD AUTO: 0.02 X10(3) UL (ref 0–0.2)
BASOPHILS NFR BLD AUTO: 0.2 %
BILIRUB SERPL-MCNC: 0.2 MG/DL (ref 0.1–2)
BUN BLD-MCNC: 48 MG/DL (ref 7–18)
BUN BLD-MCNC: 53 MG/DL (ref 7–18)
CALCIUM BLD-MCNC: 8.8 MG/DL (ref 8.5–10.1)
CALCIUM BLD-MCNC: 8.9 MG/DL (ref 8.5–10.1)
CHLORIDE SERPL-SCNC: 102 MMOL/L (ref 98–112)
CHLORIDE SERPL-SCNC: 106 MMOL/L (ref 98–112)
CHOLEST SERPL-MCNC: 270 MG/DL (ref ?–200)
CO2 SERPL-SCNC: 29 MMOL/L (ref 21–32)
CO2 SERPL-SCNC: 31 MMOL/L (ref 21–32)
CREAT BLD-MCNC: 1.47 MG/DL
CREAT BLD-MCNC: 1.97 MG/DL
EOSINOPHIL # BLD AUTO: 0 X10(3) UL (ref 0–0.7)
EOSINOPHIL NFR BLD AUTO: 0 %
ERYTHROCYTE [DISTWIDTH] IN BLOOD BY AUTOMATED COUNT: 15.4 %
GLOBULIN PLAS-MCNC: 3.3 G/DL (ref 2.8–4.4)
GLUCOSE BLD-MCNC: 103 MG/DL (ref 70–99)
GLUCOSE BLD-MCNC: 115 MG/DL (ref 70–99)
GLUCOSE BLD-MCNC: 119 MG/DL (ref 70–99)
GLUCOSE BLD-MCNC: 193 MG/DL (ref 70–99)
GLUCOSE BLD-MCNC: 201 MG/DL (ref 70–99)
GLUCOSE BLD-MCNC: 95 MG/DL (ref 70–99)
GLUCOSE BLD-MCNC: 97 MG/DL (ref 70–99)
HCT VFR BLD AUTO: 34.3 %
HDLC SERPL-MCNC: 67 MG/DL (ref 40–59)
HGB BLD-MCNC: 11 G/DL
IMM GRANULOCYTES # BLD AUTO: 0.03 X10(3) UL (ref 0–1)
IMM GRANULOCYTES NFR BLD: 0.2 %
INR BLD: 0.99 (ref 0.8–1.2)
LDLC SERPL CALC-MCNC: 177 MG/DL (ref ?–100)
LYMPHOCYTES # BLD AUTO: 6.09 X10(3) UL (ref 1–4)
LYMPHOCYTES NFR BLD AUTO: 46.7 %
MAGNESIUM SERPL-MCNC: 1.7 MG/DL (ref 1.6–2.6)
MAGNESIUM SERPL-MCNC: 1.8 MG/DL (ref 1.6–2.6)
MCH RBC QN AUTO: 29.6 PG (ref 26–34)
MCHC RBC AUTO-ENTMCNC: 32.1 G/DL (ref 31–37)
MCV RBC AUTO: 92.5 FL
MONOCYTES # BLD AUTO: 0.04 X10(3) UL (ref 0.1–1)
MONOCYTES NFR BLD AUTO: 0.3 %
NEUTROPHILS # BLD AUTO: 6.87 X10 (3) UL (ref 1.5–7.7)
NEUTROPHILS # BLD AUTO: 6.87 X10(3) UL (ref 1.5–7.7)
NEUTROPHILS NFR BLD AUTO: 52.6 %
NONHDLC SERPL-MCNC: 203 MG/DL (ref ?–130)
OSMOLALITY SERPL CALC.SUM OF ELEC: 304 MOSM/KG (ref 275–295)
OSMOLALITY SERPL CALC.SUM OF ELEC: 307 MOSM/KG (ref 275–295)
P AXIS: 80 DEGREES
P-R INTERVAL: 144 MS
PHOSPHATE SERPL-MCNC: 3 MG/DL (ref 2.5–4.9)
PLATELET # BLD AUTO: 250 10(3)UL (ref 150–450)
POTASSIUM SERPL-SCNC: 4 MMOL/L (ref 3.5–5.1)
POTASSIUM SERPL-SCNC: 4.1 MMOL/L (ref 3.5–5.1)
PROT SERPL-MCNC: 5.3 G/DL (ref 6.4–8.2)
PROTHROMBIN TIME: 13.1 SECONDS (ref 11.6–14.8)
Q-T INTERVAL: 374 MS
QRS DURATION: 100 MS
QTC CALCULATION (BEZET): 382 MS
R AXIS: -52 DEGREES
RBC # BLD AUTO: 3.71 X10(6)UL
SARS-COV-2 RNA RESP QL NAA+PROBE: NOT DETECTED
SODIUM SERPL-SCNC: 139 MMOL/L (ref 136–145)
SODIUM SERPL-SCNC: 142 MMOL/L (ref 136–145)
T AXIS: 44 DEGREES
TRIGL SERPL-MCNC: 144 MG/DL (ref 30–149)
VENTRICULAR RATE: 63 BPM
VLDLC SERPL CALC-MCNC: 29 MG/DL (ref 0–30)
WBC # BLD AUTO: 13.1 X10(3) UL (ref 4–11)

## 2022-07-07 PROCEDURE — 99223 1ST HOSP IP/OBS HIGH 75: CPT | Performed by: INTERNAL MEDICINE

## 2022-07-07 PROCEDURE — 99232 SBSQ HOSP IP/OBS MODERATE 35: CPT | Performed by: STUDENT IN AN ORGANIZED HEALTH CARE EDUCATION/TRAINING PROGRAM

## 2022-07-07 PROCEDURE — 99233 SBSQ HOSP IP/OBS HIGH 50: CPT | Performed by: INTERNAL MEDICINE

## 2022-07-07 RX ORDER — MYCOPHENOLATE MOFETIL 250 MG/1
500 CAPSULE ORAL
Status: DISCONTINUED | OUTPATIENT
Start: 2022-07-08 | End: 2022-07-09

## 2022-07-07 NOTE — PLAN OF CARE
Patient is aox3, vss,ra, lungs diminished, NSR, Last bm yesterday, +4 pitting edema to bilateral lower extremities. Bumex gtt 0.5 mg/hr=4 ml/hr. Albumin q 8 hrs and Nephrotic syndrome d/t  immunosupressents from chemo. Was on po prednisone at home and now is on Solumedrol IV. CGM intact, QID Gluc, Levemir and Novolog. Continue to diureses. BMP and Mag in am.  Rapid Covid sent to lab (not done during admission).       Problem: Diabetes/Glucose Control  Goal: Glucose maintained within prescribed range  Description: INTERVENTIONS:  - Monitor Blood Glucose as ordered  - Assess for signs and symptoms of hyperglycemia and hypoglycemia  - Administer ordered medications to maintain glucose within target range  - Assess barriers to adequate nutritional intake and initiate nutrition consult as needed  - Instruct patient on self management of diabetes  Outcome: Progressing

## 2022-07-07 NOTE — PROGRESS NOTES
07/06/22 1949 07/06/22 1952 07/06/22 1954   Vital Signs   /66 150/71 157/69   MAP (mmHg) 88 90 90   BP Location Left arm Left arm Left arm   BP Method Automatic Automatic Automatic   Patient Position Lying Sitting Standing

## 2022-07-08 LAB
ANION GAP SERPL CALC-SCNC: 6 MMOL/L (ref 0–18)
BUN BLD-MCNC: 50 MG/DL (ref 7–18)
CALCIUM BLD-MCNC: 8.9 MG/DL (ref 8.5–10.1)
CHLORIDE SERPL-SCNC: 102 MMOL/L (ref 98–112)
CO2 SERPL-SCNC: 31 MMOL/L (ref 21–32)
CREAT BLD-MCNC: 1.67 MG/DL
GLUCOSE BLD-MCNC: 146 MG/DL (ref 70–99)
GLUCOSE BLD-MCNC: 157 MG/DL (ref 70–99)
GLUCOSE BLD-MCNC: 169 MG/DL (ref 70–99)
GLUCOSE BLD-MCNC: 169 MG/DL (ref 70–99)
GLUCOSE BLD-MCNC: 174 MG/DL (ref 70–99)
GLUCOSE BLD-MCNC: 176 MG/DL (ref 70–99)
GLUCOSE BLD-MCNC: 203 MG/DL (ref 70–99)
GLUCOSE BLD-MCNC: 325 MG/DL (ref 70–99)
GLUCOSE BLD-MCNC: 55 MG/DL (ref 70–99)
MAGNESIUM SERPL-MCNC: 1.7 MG/DL (ref 1.6–2.6)
OSMOLALITY SERPL CALC.SUM OF ELEC: 305 MOSM/KG (ref 275–295)
POTASSIUM SERPL-SCNC: 3.6 MMOL/L (ref 3.5–5.1)
SODIUM SERPL-SCNC: 139 MMOL/L (ref 136–145)

## 2022-07-08 PROCEDURE — 99232 SBSQ HOSP IP/OBS MODERATE 35: CPT | Performed by: STUDENT IN AN ORGANIZED HEALTH CARE EDUCATION/TRAINING PROGRAM

## 2022-07-08 PROCEDURE — 99232 SBSQ HOSP IP/OBS MODERATE 35: CPT | Performed by: INTERNAL MEDICINE

## 2022-07-08 PROCEDURE — 99233 SBSQ HOSP IP/OBS HIGH 50: CPT | Performed by: INTERNAL MEDICINE

## 2022-07-08 PROCEDURE — 99232 SBSQ HOSP IP/OBS MODERATE 35: CPT | Performed by: CLINICAL NURSE SPECIALIST

## 2022-07-08 RX ORDER — MAGNESIUM SULFATE HEPTAHYDRATE 40 MG/ML
2 INJECTION, SOLUTION INTRAVENOUS ONCE
Status: COMPLETED | OUTPATIENT
Start: 2022-07-08 | End: 2022-07-08

## 2022-07-08 RX ORDER — POTASSIUM CHLORIDE 20 MEQ/1
40 TABLET, EXTENDED RELEASE ORAL ONCE
Status: COMPLETED | OUTPATIENT
Start: 2022-07-08 | End: 2022-07-08

## 2022-07-08 RX ORDER — SULFAMETHOXAZOLE AND TRIMETHOPRIM 800; 160 MG/1; MG/1
1 TABLET ORAL
Qty: 15 TABLET | Refills: 1 | Status: SHIPPED | OUTPATIENT
Start: 2022-07-08 | End: 2022-07-09

## 2022-07-08 RX ORDER — ALBUMIN (HUMAN) 12.5 G/50ML
25 SOLUTION INTRAVENOUS EVERY 8 HOURS
Status: DISCONTINUED | OUTPATIENT
Start: 2022-07-08 | End: 2022-07-09

## 2022-07-08 RX ORDER — PREDNISONE 10 MG/1
20 TABLET ORAL DAILY
Qty: 60 TABLET | Refills: 0 | Status: SHIPPED | OUTPATIENT
Start: 2022-07-08 | End: 2022-07-09

## 2022-07-08 NOTE — PLAN OF CARE
Patient is aox3, vss, ra,lungs clear, NSR, positive bowel sounds, bm this am. +4 pitting edema but greatly improved compared to yesterday. Continue bumex 0.5 mg/hr=4ml/hr, albumin and solumedrol. Dr Francisca Gay ordered to replaced electrolytes. Up at [facility].   Problem: Diabetes/Glucose Control  Goal: Glucose maintained within prescribed range  Description: INTERVENTIONS:  - Monitor Blood Glucose as ordered  - Assess for signs and symptoms of hyperglycemia and hypoglycemia  - Administer ordered medications to maintain glucose within target range  - Assess barriers to adequate nutritional intake and initiate nutrition consult as needed  - Instruct patient on self management of diabetes  Outcome: Progressing

## 2022-07-09 VITALS
OXYGEN SATURATION: 93 % | SYSTOLIC BLOOD PRESSURE: 172 MMHG | BODY MASS INDEX: 28 KG/M2 | TEMPERATURE: 97 F | WEIGHT: 188.69 LBS | DIASTOLIC BLOOD PRESSURE: 68 MMHG | HEART RATE: 64 BPM | RESPIRATION RATE: 17 BRPM

## 2022-07-09 LAB
ANION GAP SERPL CALC-SCNC: 5 MMOL/L (ref 0–18)
BUN BLD-MCNC: 49 MG/DL (ref 7–18)
CALCIUM BLD-MCNC: 8.9 MG/DL (ref 8.5–10.1)
CHLORIDE SERPL-SCNC: 97 MMOL/L (ref 98–112)
CO2 SERPL-SCNC: 34 MMOL/L (ref 21–32)
CREAT BLD-MCNC: 2.03 MG/DL
GLUCOSE BLD-MCNC: 199 MG/DL (ref 70–99)
GLUCOSE BLD-MCNC: 378 MG/DL (ref 70–99)
OSMOLALITY SERPL CALC.SUM OF ELEC: 311 MOSM/KG (ref 275–295)
POTASSIUM SERPL-SCNC: 4.4 MMOL/L (ref 3.5–5.1)
SODIUM SERPL-SCNC: 136 MMOL/L (ref 136–145)

## 2022-07-09 PROCEDURE — 99233 SBSQ HOSP IP/OBS HIGH 50: CPT | Performed by: INTERNAL MEDICINE

## 2022-07-09 PROCEDURE — 99239 HOSP IP/OBS DSCHRG MGMT >30: CPT | Performed by: STUDENT IN AN ORGANIZED HEALTH CARE EDUCATION/TRAINING PROGRAM

## 2022-07-09 RX ORDER — MYCOPHENOLATE MOFETIL 250 MG/1
500 CAPSULE ORAL
Qty: 120 CAPSULE | Refills: 11 | Status: SHIPPED | OUTPATIENT
Start: 2022-07-09

## 2022-07-09 RX ORDER — SULFAMETHOXAZOLE AND TRIMETHOPRIM 800; 160 MG/1; MG/1
1 TABLET ORAL
Qty: 15 TABLET | Refills: 1 | Status: SHIPPED | OUTPATIENT
Start: 2022-07-11

## 2022-07-09 RX ORDER — MYCOPHENOLATE MOFETIL 250 MG/1
500 CAPSULE ORAL
Qty: 120 CAPSULE | Refills: 11 | Status: SHIPPED | OUTPATIENT
Start: 2022-07-09 | End: 2022-07-09

## 2022-07-09 RX ORDER — PREDNISONE 10 MG/1
20 TABLET ORAL DAILY
Qty: 60 TABLET | Refills: 0 | Status: SHIPPED | OUTPATIENT
Start: 2022-07-09

## 2022-07-09 RX ORDER — TORSEMIDE 20 MG/1
40 TABLET ORAL
Status: DISCONTINUED | OUTPATIENT
Start: 2022-07-09 | End: 2022-07-09

## 2022-07-09 NOTE — PLAN OF CARE
Patient is aox3, vss, ra, lungs clear, no edema. NSR, Bumex gtt discontinued. Started torsemide 40 mg BID. Dr Nancy Tatum adjusting insulin. Albumin given.   OK to DC per renal, FU with Dr Heather Brooke on 7/19, OK to dc home per Hospitalist.  Problem: Diabetes/Glucose Control  Goal: Glucose maintained within prescribed range  Description: INTERVENTIONS:  - Monitor Blood Glucose as ordered  - Assess for signs and symptoms of hyperglycemia and hypoglycemia  - Administer ordered medications to maintain glucose within target range  - Assess barriers to adequate nutritional intake and initiate nutrition consult as needed  - Instruct patient on self management of diabetes  Outcome: Completed

## 2022-07-21 ENCOUNTER — TELEPHONE (OUTPATIENT)
Dept: HEMATOLOGY/ONCOLOGY | Facility: HOSPITAL | Age: 77
End: 2022-07-21

## 2022-07-21 NOTE — TELEPHONE ENCOUNTER
Patient called. He wants to know if you need to see him on 7/25/22 or should he reschedule until after his appointment with Dr. Jaci Damon? Please advise. Thank you.

## 2022-07-25 ENCOUNTER — APPOINTMENT (OUTPATIENT)
Dept: HEMATOLOGY/ONCOLOGY | Facility: HOSPITAL | Age: 77
End: 2022-07-25
Attending: INTERNAL MEDICINE
Payer: MEDICARE

## 2022-07-26 ENCOUNTER — LAB ENCOUNTER (OUTPATIENT)
Dept: LAB | Facility: HOSPITAL | Age: 77
End: 2022-07-26
Attending: INTERNAL MEDICINE
Payer: MEDICARE

## 2022-07-26 DIAGNOSIS — N05.0 MINIMAL CHANGE DISEASE: ICD-10-CM

## 2022-07-26 DIAGNOSIS — R60.1 ANASARCA: ICD-10-CM

## 2022-07-26 LAB
ANION GAP SERPL CALC-SCNC: 6 MMOL/L (ref 0–18)
BUN BLD-MCNC: 54 MG/DL (ref 7–18)
CALCIUM BLD-MCNC: 8.9 MG/DL (ref 8.5–10.1)
CHLORIDE SERPL-SCNC: 100 MMOL/L (ref 98–112)
CO2 SERPL-SCNC: 29 MMOL/L (ref 21–32)
CREAT BLD-MCNC: 1.73 MG/DL
CREAT UR-SCNC: 56 MG/DL
GLUCOSE BLD-MCNC: 114 MG/DL (ref 70–99)
MAGNESIUM SERPL-MCNC: 1.8 MG/DL (ref 1.6–2.6)
MICROALBUMIN UR-MCNC: 306 MG/DL
MICROALBUMIN/CREAT 24H UR-RTO: 5464.3 UG/MG (ref ?–30)
OSMOLALITY SERPL CALC.SUM OF ELEC: 296 MOSM/KG (ref 275–295)
POTASSIUM SERPL-SCNC: 4.3 MMOL/L (ref 3.5–5.1)
SODIUM SERPL-SCNC: 135 MMOL/L (ref 136–145)

## 2022-07-26 PROCEDURE — 83735 ASSAY OF MAGNESIUM: CPT

## 2022-07-26 PROCEDURE — 82043 UR ALBUMIN QUANTITATIVE: CPT

## 2022-07-26 PROCEDURE — 80048 BASIC METABOLIC PNL TOTAL CA: CPT

## 2022-07-26 PROCEDURE — 36415 COLL VENOUS BLD VENIPUNCTURE: CPT

## 2022-07-26 PROCEDURE — 82570 ASSAY OF URINE CREATININE: CPT

## 2022-08-02 ENCOUNTER — OFFICE VISIT (OUTPATIENT)
Dept: NEPHROLOGY | Facility: CLINIC | Age: 77
End: 2022-08-02
Payer: MEDICARE

## 2022-08-02 ENCOUNTER — OFFICE VISIT (OUTPATIENT)
Dept: HEMATOLOGY/ONCOLOGY | Age: 77
End: 2022-08-02
Attending: INTERNAL MEDICINE
Payer: MEDICARE

## 2022-08-02 VITALS
OXYGEN SATURATION: 100 % | HEART RATE: 74 BPM | WEIGHT: 188.5 LBS | TEMPERATURE: 97 F | DIASTOLIC BLOOD PRESSURE: 71 MMHG | SYSTOLIC BLOOD PRESSURE: 122 MMHG | BODY MASS INDEX: 27.92 KG/M2 | RESPIRATION RATE: 18 BRPM | HEIGHT: 69.02 IN

## 2022-08-02 VITALS — DIASTOLIC BLOOD PRESSURE: 66 MMHG | BODY MASS INDEX: 28 KG/M2 | SYSTOLIC BLOOD PRESSURE: 112 MMHG | WEIGHT: 188 LBS

## 2022-08-02 DIAGNOSIS — R59.9 ADENOPATHY: ICD-10-CM

## 2022-08-02 DIAGNOSIS — T88.7XXA MEDICATION SIDE EFFECTS: ICD-10-CM

## 2022-08-02 DIAGNOSIS — R80.9 NEPHROTIC RANGE PROTEINURIA: ICD-10-CM

## 2022-08-02 DIAGNOSIS — R89.7 PODOCYTE FOOT PROCESS EFFACEMENT PRESENT ON ELECTRON MICROSCOPY OF RENAL BIOPSY: ICD-10-CM

## 2022-08-02 DIAGNOSIS — C78.7 LIVER METASTASES (HCC): ICD-10-CM

## 2022-08-02 DIAGNOSIS — Z86.711 HISTORY OF PULMONARY EMBOLISM: ICD-10-CM

## 2022-08-02 DIAGNOSIS — R89.7 PODOCYTE FOOT PROCESS EFFACEMENT PRESENT ON ELECTRON MICROSCOPY OF RENAL BIOPSY: Primary | ICD-10-CM

## 2022-08-02 DIAGNOSIS — C34.81 MALIGNANT NEOPLASM OF OVERLAPPING SITES OF RIGHT LUNG (HCC): ICD-10-CM

## 2022-08-02 DIAGNOSIS — M79.89 LEG SWELLING: ICD-10-CM

## 2022-08-02 LAB
ALBUMIN SERPL-MCNC: 1.7 G/DL (ref 3.4–5)
ALBUMIN/GLOB SERPL: 0.4 {RATIO} (ref 1–2)
ALP LIVER SERPL-CCNC: 79 U/L
ALT SERPL-CCNC: 16 U/L
ANION GAP SERPL CALC-SCNC: 5 MMOL/L (ref 0–18)
AST SERPL-CCNC: 18 U/L (ref 15–37)
BASOPHILS # BLD AUTO: 0.02 X10(3) UL (ref 0–0.2)
BASOPHILS NFR BLD AUTO: 0.1 %
BILIRUB SERPL-MCNC: 0.1 MG/DL (ref 0.1–2)
BUN BLD-MCNC: 51 MG/DL (ref 7–18)
CALCIUM BLD-MCNC: 8.8 MG/DL (ref 8.5–10.1)
CEA SERPL-MCNC: 4.9 NG/ML (ref ?–5)
CHLORIDE SERPL-SCNC: 102 MMOL/L (ref 98–112)
CO2 SERPL-SCNC: 29 MMOL/L (ref 21–32)
CREAT BLD-MCNC: 1.72 MG/DL
EOSINOPHIL # BLD AUTO: 0.01 X10(3) UL (ref 0–0.7)
EOSINOPHIL NFR BLD AUTO: 0.1 %
ERYTHROCYTE [DISTWIDTH] IN BLOOD BY AUTOMATED COUNT: 14.6 %
FASTING STATUS PATIENT QL REPORTED: NO
GFR SERPLBLD BASED ON 1.73 SQ M-ARVRAT: 40 ML/MIN/1.73M2 (ref 60–?)
GLOBULIN PLAS-MCNC: 3.9 G/DL (ref 2.8–4.4)
GLUCOSE BLD-MCNC: 164 MG/DL (ref 70–99)
HCT VFR BLD AUTO: 31.7 %
HGB BLD-MCNC: 10.2 G/DL
IMM GRANULOCYTES # BLD AUTO: 0.06 X10(3) UL (ref 0–1)
IMM GRANULOCYTES NFR BLD: 0.4 %
LDH SERPL L TO P-CCNC: 210 U/L
LYMPHOCYTES # BLD AUTO: 5.98 X10(3) UL (ref 1–4)
LYMPHOCYTES NFR BLD AUTO: 43.3 %
MCH RBC QN AUTO: 29.2 PG (ref 26–34)
MCHC RBC AUTO-ENTMCNC: 32.2 G/DL (ref 31–37)
MCV RBC AUTO: 90.8 FL
MONOCYTES # BLD AUTO: 0.21 X10(3) UL (ref 0.1–1)
MONOCYTES NFR BLD AUTO: 1.5 %
NEUTROPHILS # BLD AUTO: 7.52 X10 (3) UL (ref 1.5–7.7)
NEUTROPHILS # BLD AUTO: 7.52 X10(3) UL (ref 1.5–7.7)
NEUTROPHILS NFR BLD AUTO: 54.6 %
OSMOLALITY SERPL CALC.SUM OF ELEC: 299 MOSM/KG (ref 275–295)
PLATELET # BLD AUTO: 360 10(3)UL (ref 150–450)
POTASSIUM SERPL-SCNC: 4.7 MMOL/L (ref 3.5–5.1)
PROT SERPL-MCNC: 5.6 G/DL (ref 6.4–8.2)
RBC # BLD AUTO: 3.49 X10(6)UL
SODIUM SERPL-SCNC: 136 MMOL/L (ref 136–145)
WBC # BLD AUTO: 13.8 X10(3) UL (ref 4–11)

## 2022-08-02 PROCEDURE — 99215 OFFICE O/P EST HI 40 MIN: CPT | Performed by: INTERNAL MEDICINE

## 2022-08-02 PROCEDURE — 1111F DSCHRG MED/CURRENT MED MERGE: CPT | Performed by: INTERNAL MEDICINE

## 2022-08-02 PROCEDURE — 99214 OFFICE O/P EST MOD 30 MIN: CPT | Performed by: INTERNAL MEDICINE

## 2022-08-02 RX ORDER — PREDNISONE 10 MG/1
10 TABLET ORAL DAILY
Qty: 30 TABLET | Refills: 1 | Status: SHIPPED | OUTPATIENT
Start: 2022-08-02

## 2022-08-02 RX ORDER — TORSEMIDE 20 MG/1
40 TABLET ORAL 2 TIMES DAILY
COMMUNITY

## 2022-08-02 RX ORDER — METOLAZONE 5 MG/1
2.5 TABLET ORAL DAILY PRN
Qty: 15 TABLET | Refills: 1 | Status: SHIPPED | OUTPATIENT
Start: 2022-08-02 | End: 2022-09-01

## 2022-08-02 RX ORDER — MYCOPHENOLATE MOFETIL 250 MG/1
750 CAPSULE ORAL
Qty: 120 CAPSULE | Refills: 11 | Status: SHIPPED | OUTPATIENT
Start: 2022-08-02

## 2022-08-02 NOTE — PROGRESS NOTES
Pt here for 6 week MD f/u. Pt seeing Dr. Reyes Clark after this appt. Energy level has been lower since last visit, pt makes himself eat. Pt has chronic back pain, uses Tylenol. Pt has new chest pain, below sternum and in abdomen, not sure if muscular pain. Pt has continued SOB with Exertion and white productive cough. Pt notes shane ankle and foot swelling is starting to increase, was initially improved after hospital discharge.      Outpatient Oncology Care Plan  Problem list:  fatigue  knowledge deficit    Problems related to:    disease/disease progression    Interventions:  provided general teaching    Expected outcomes:  safe in environment  symptoms relieved/minimized  understands plan of care    Progress towards outcome:  making progress    Education Record    Learner:  Patient and Spouse  Barriers / Limitations:  None  Method:  Reinforcement  Outcome:  Shows understanding  Comments:

## 2022-08-12 ENCOUNTER — APPOINTMENT (OUTPATIENT)
Dept: HEMATOLOGY/ONCOLOGY | Facility: HOSPITAL | Age: 77
End: 2022-08-12
Attending: INTERNAL MEDICINE
Payer: MEDICARE

## 2022-08-16 ENCOUNTER — LAB ENCOUNTER (OUTPATIENT)
Dept: LAB | Age: 77
End: 2022-08-16
Attending: INTERNAL MEDICINE
Payer: MEDICARE

## 2022-08-16 DIAGNOSIS — E11.9 DM2 (DIABETES MELLITUS, TYPE 2) (HCC): ICD-10-CM

## 2022-08-16 DIAGNOSIS — I25.10 CORONARY ARTERY DISEASE: Primary | ICD-10-CM

## 2022-08-16 DIAGNOSIS — R89.7 PODOCYTE FOOT PROCESS EFFACEMENT PRESENT ON ELECTRON MICROSCOPY OF RENAL BIOPSY: ICD-10-CM

## 2022-08-16 DIAGNOSIS — E78.00 PURE HYPERCHOLESTEROLEMIA: ICD-10-CM

## 2022-08-16 DIAGNOSIS — R80.9 NEPHROTIC RANGE PROTEINURIA: ICD-10-CM

## 2022-08-16 LAB
ALBUMIN SERPL-MCNC: 1.3 G/DL (ref 3.4–5)
ALBUMIN/GLOB SERPL: 0.3 {RATIO} (ref 1–2)
ALP LIVER SERPL-CCNC: 95 U/L
ALT SERPL-CCNC: 14 U/L
ANION GAP SERPL CALC-SCNC: 7 MMOL/L (ref 0–18)
AST SERPL-CCNC: 14 U/L (ref 15–37)
BASOPHILS # BLD AUTO: 0.02 X10(3) UL (ref 0–0.2)
BASOPHILS NFR BLD AUTO: 0.2 %
BILIRUB SERPL-MCNC: 0.2 MG/DL (ref 0.1–2)
BILIRUB UR QL STRIP.AUTO: NEGATIVE
BUN BLD-MCNC: 38 MG/DL (ref 7–18)
CALCIUM BLD-MCNC: 8.5 MG/DL (ref 8.5–10.1)
CHLORIDE SERPL-SCNC: 105 MMOL/L (ref 98–112)
CLARITY UR REFRACT.AUTO: CLEAR
CO2 SERPL-SCNC: 23 MMOL/L (ref 21–32)
COLOR UR AUTO: YELLOW
CREAT BLD-MCNC: 1.67 MG/DL
CREAT UR-SCNC: 95.4 MG/DL
EOSINOPHIL # BLD AUTO: 0.04 X10(3) UL (ref 0–0.7)
EOSINOPHIL NFR BLD AUTO: 0.3 %
ERYTHROCYTE [DISTWIDTH] IN BLOOD BY AUTOMATED COUNT: 14.6 %
FASTING STATUS PATIENT QL REPORTED: NO
GFR SERPLBLD BASED ON 1.73 SQ M-ARVRAT: 42 ML/MIN/1.73M2 (ref 60–?)
GLOBULIN PLAS-MCNC: 4 G/DL (ref 2.8–4.4)
GLUCOSE BLD-MCNC: 153 MG/DL (ref 70–99)
GLUCOSE UR STRIP.AUTO-MCNC: NEGATIVE MG/DL
HCT VFR BLD AUTO: 31.1 %
HGB BLD-MCNC: 9.9 G/DL
HYALINE CASTS #/AREA URNS AUTO: PRESENT /LPF
IMM GRANULOCYTES # BLD AUTO: 0.04 X10(3) UL (ref 0–1)
IMM GRANULOCYTES NFR BLD: 0.3 %
KETONES UR STRIP.AUTO-MCNC: NEGATIVE MG/DL
LEUKOCYTE ESTERASE UR QL STRIP.AUTO: NEGATIVE
LYMPHOCYTES # BLD AUTO: 5.84 X10(3) UL (ref 1–4)
LYMPHOCYTES NFR BLD AUTO: 50.3 %
MAGNESIUM SERPL-MCNC: 1.8 MG/DL (ref 1.6–2.6)
MCH RBC QN AUTO: 29.3 PG (ref 26–34)
MCHC RBC AUTO-ENTMCNC: 31.8 G/DL (ref 31–37)
MCV RBC AUTO: 92 FL
MONOCYTES # BLD AUTO: 0.31 X10(3) UL (ref 0.1–1)
MONOCYTES NFR BLD AUTO: 2.7 %
NEUTROPHILS # BLD AUTO: 5.37 X10 (3) UL (ref 1.5–7.7)
NEUTROPHILS # BLD AUTO: 5.37 X10(3) UL (ref 1.5–7.7)
NEUTROPHILS NFR BLD AUTO: 46.2 %
NITRITE UR QL STRIP.AUTO: NEGATIVE
OSMOLALITY SERPL CALC.SUM OF ELEC: 292 MOSM/KG (ref 275–295)
PH UR STRIP.AUTO: 6 [PH] (ref 5–8)
PLATELET # BLD AUTO: 354 10(3)UL (ref 150–450)
POTASSIUM SERPL-SCNC: 4.1 MMOL/L (ref 3.5–5.1)
PROT SERPL-MCNC: 5.3 G/DL (ref 6.4–8.2)
PROT UR STRIP.AUTO-MCNC: >=300 MG/DL
PROT UR-MCNC: 794.3 MG/DL
RBC # BLD AUTO: 3.38 X10(6)UL
RBC #/AREA URNS AUTO: >10 /HPF
SODIUM SERPL-SCNC: 135 MMOL/L (ref 136–145)
SP GR UR STRIP.AUTO: 1.02 (ref 1–1.03)
UROBILINOGEN UR STRIP.AUTO-MCNC: 0.2 MG/DL
WBC # BLD AUTO: 11.6 X10(3) UL (ref 4–11)

## 2022-08-16 PROCEDURE — 36415 COLL VENOUS BLD VENIPUNCTURE: CPT

## 2022-08-16 PROCEDURE — 82570 ASSAY OF URINE CREATININE: CPT

## 2022-08-16 PROCEDURE — 83735 ASSAY OF MAGNESIUM: CPT

## 2022-08-16 PROCEDURE — 80053 COMPREHEN METABOLIC PANEL: CPT

## 2022-08-16 PROCEDURE — 84156 ASSAY OF PROTEIN URINE: CPT

## 2022-08-16 PROCEDURE — 81001 URINALYSIS AUTO W/SCOPE: CPT

## 2022-08-16 PROCEDURE — 81015 MICROSCOPIC EXAM OF URINE: CPT

## 2022-08-16 PROCEDURE — 85025 COMPLETE CBC W/AUTO DIFF WBC: CPT

## 2022-08-22 ENCOUNTER — TELEPHONE (OUTPATIENT)
Dept: NEPHROLOGY | Facility: CLINIC | Age: 77
End: 2022-08-22

## 2022-08-23 ENCOUNTER — HOSPITAL ENCOUNTER (OUTPATIENT)
Dept: CT IMAGING | Facility: HOSPITAL | Age: 77
Discharge: HOME | End: 2022-08-23
Attending: INTERNAL MEDICINE
Payer: MEDICARE

## 2022-08-23 ENCOUNTER — TELEPHONE (OUTPATIENT)
Dept: HEMATOLOGY/ONCOLOGY | Facility: HOSPITAL | Age: 77
End: 2022-08-23

## 2022-08-23 ENCOUNTER — HOSPITAL ENCOUNTER (OUTPATIENT)
Dept: CT IMAGING | Facility: HOSPITAL | Age: 77
Discharge: HOME | DRG: 683 | End: 2022-08-23
Attending: INTERNAL MEDICINE
Payer: MEDICARE

## 2022-08-23 ENCOUNTER — TELEPHONE (OUTPATIENT)
Dept: NEPHROLOGY | Facility: CLINIC | Age: 77
End: 2022-08-23

## 2022-08-23 DIAGNOSIS — C34.81 MALIGNANT NEOPLASM OF OVERLAPPING SITES OF RIGHT LUNG (HCC): ICD-10-CM

## 2022-08-23 PROCEDURE — 71250 CT THORAX DX C-: CPT | Performed by: INTERNAL MEDICINE

## 2022-08-23 PROCEDURE — 74176 CT ABD & PELVIS W/O CONTRAST: CPT | Performed by: INTERNAL MEDICINE

## 2022-08-23 NOTE — TELEPHONE ENCOUNTER
Deven Getting called to inform Dr Jordan Ballesteros that he will be going to the ER tomorrow after his wife's appointment.

## 2022-08-23 NOTE — TELEPHONE ENCOUNTER
Patient called. He is going in the hospital tomorrow. He will not be able to make his appointment 8/26/22. He wants to know if Dr. Edna Pereira will visit him in the hospital. Please call patient when able. Thank you.

## 2022-08-23 NOTE — TELEPHONE ENCOUNTER
Pt being admitted tomorrow per Dr. Fabienne Funez for diuresis, probably 1-2 days. Inquiring if Dr. Lindsay Thomas will see him in hospital, told pt and wife that I will update her on pt's admission, appt for Friday kept in case pt discharged on Thursday. Pt understands and agrees.

## 2022-08-23 NOTE — TELEPHONE ENCOUNTER
Patient called again today. He said he was having trouble with his phone yesterday. He asked if you would call him today.

## 2022-08-24 ENCOUNTER — HOSPITAL ENCOUNTER (INPATIENT)
Facility: HOSPITAL | Age: 77
LOS: 3 days | Discharge: HOME | End: 2022-08-27
Attending: EMERGENCY MEDICINE | Admitting: INTERNAL MEDICINE
Payer: MEDICARE

## 2022-08-24 ENCOUNTER — HOSPITAL ENCOUNTER (INPATIENT)
Facility: HOSPITAL | Age: 77
LOS: 3 days | Discharge: HOME | DRG: 683 | End: 2022-08-27
Attending: EMERGENCY MEDICINE | Admitting: INTERNAL MEDICINE
Payer: MEDICARE

## 2022-08-24 DIAGNOSIS — D64.9 ANEMIA, UNSPECIFIED TYPE: ICD-10-CM

## 2022-08-24 DIAGNOSIS — E87.70 HYPERVOLEMIA, UNSPECIFIED HYPERVOLEMIA TYPE: Primary | ICD-10-CM

## 2022-08-24 DIAGNOSIS — I82.441 ACUTE DEEP VEIN THROMBOSIS (DVT) OF TIBIAL VEIN OF RIGHT LOWER EXTREMITY (HCC): ICD-10-CM

## 2022-08-24 DIAGNOSIS — N04.9 NEPHROTIC SYNDROME: ICD-10-CM

## 2022-08-24 DIAGNOSIS — N18.30 STAGE 3 CHRONIC KIDNEY DISEASE, UNSPECIFIED WHETHER STAGE 3A OR 3B CKD (HCC): ICD-10-CM

## 2022-08-24 DIAGNOSIS — O22.30 DVT (DEEP VEIN THROMBOSIS) IN PREGNANCY: ICD-10-CM

## 2022-08-24 DIAGNOSIS — E11.65 TYPE 2 DIABETES MELLITUS WITH HYPERGLYCEMIA, UNSPECIFIED WHETHER LONG TERM INSULIN USE (HCC): ICD-10-CM

## 2022-08-24 DIAGNOSIS — C34.91 NON-SMALL CELL CANCER OF RIGHT LUNG (HCC): ICD-10-CM

## 2022-08-24 DIAGNOSIS — R80.9 NEPHROTIC RANGE PROTEINURIA: ICD-10-CM

## 2022-08-24 DIAGNOSIS — R89.7 PODOCYTE FOOT PROCESS EFFACEMENT PRESENT ON ELECTRON MICROSCOPY OF RENAL BIOPSY: ICD-10-CM

## 2022-08-24 LAB
ALBUMIN SERPL-MCNC: 1.3 G/DL (ref 3.4–5)
ALBUMIN SERPL-MCNC: 1.5 G/DL (ref 3.4–5)
ALBUMIN/GLOB SERPL: 0.3 {RATIO} (ref 1–2)
ALBUMIN/GLOB SERPL: 0.4 {RATIO} (ref 1–2)
ALP LIVER SERPL-CCNC: 88 U/L
ALP LIVER SERPL-CCNC: 97 U/L
ALT SERPL-CCNC: 15 U/L
ALT SERPL-CCNC: 16 U/L
ANION GAP SERPL CALC-SCNC: 3 MMOL/L (ref 0–18)
ANION GAP SERPL CALC-SCNC: 7 MMOL/L (ref 0–18)
AST SERPL-CCNC: 16 U/L (ref 15–37)
AST SERPL-CCNC: 19 U/L (ref 15–37)
BASOPHILS # BLD AUTO: 0.03 X10(3) UL (ref 0–0.2)
BASOPHILS NFR BLD AUTO: 0.3 %
BILIRUB SERPL-MCNC: 0.2 MG/DL (ref 0.1–2)
BILIRUB SERPL-MCNC: 0.2 MG/DL (ref 0.1–2)
BILIRUB UR QL STRIP.AUTO: NEGATIVE
BUN BLD-MCNC: 34 MG/DL (ref 7–18)
BUN BLD-MCNC: 34 MG/DL (ref 7–18)
CALCIUM BLD-MCNC: 8.3 MG/DL (ref 8.5–10.1)
CALCIUM BLD-MCNC: 8.8 MG/DL (ref 8.5–10.1)
CHLORIDE SERPL-SCNC: 106 MMOL/L (ref 98–112)
CHLORIDE SERPL-SCNC: 109 MMOL/L (ref 98–112)
CLARITY UR REFRACT.AUTO: CLEAR
CO2 SERPL-SCNC: 24 MMOL/L (ref 21–32)
CO2 SERPL-SCNC: 27 MMOL/L (ref 21–32)
COLOR UR AUTO: YELLOW
CREAT BLD-MCNC: 1.4 MG/DL
CREAT BLD-MCNC: 1.46 MG/DL
CREAT UR-SCNC: 32.2 MG/DL
EOSINOPHIL # BLD AUTO: 0.04 X10(3) UL (ref 0–0.7)
EOSINOPHIL NFR BLD AUTO: 0.4 %
ERYTHROCYTE [DISTWIDTH] IN BLOOD BY AUTOMATED COUNT: 14.6 %
EST. AVERAGE GLUCOSE BLD GHB EST-MCNC: 174 MG/DL (ref 68–126)
GFR SERPLBLD BASED ON 1.73 SQ M-ARVRAT: 49 ML/MIN/1.73M2 (ref 60–?)
GFR SERPLBLD BASED ON 1.73 SQ M-ARVRAT: 51 ML/MIN/1.73M2 (ref 60–?)
GLOBULIN PLAS-MCNC: 4 G/DL (ref 2.8–4.4)
GLOBULIN PLAS-MCNC: 4.2 G/DL (ref 2.8–4.4)
GLUCOSE BLD-MCNC: 133 MG/DL (ref 70–99)
GLUCOSE BLD-MCNC: 137 MG/DL (ref 70–99)
GLUCOSE BLD-MCNC: 158 MG/DL (ref 70–99)
GLUCOSE BLD-MCNC: 203 MG/DL (ref 70–99)
GLUCOSE UR STRIP.AUTO-MCNC: 100 MG/DL
HBA1C MFR BLD: 7.7 % (ref ?–5.7)
HCT VFR BLD AUTO: 29 %
HGB BLD-MCNC: 9.3 G/DL
HYALINE CASTS #/AREA URNS AUTO: PRESENT /LPF
HYALINE CASTS #/AREA URNS AUTO: PRESENT /LPF
IMM GRANULOCYTES # BLD AUTO: 0.05 X10(3) UL (ref 0–1)
IMM GRANULOCYTES NFR BLD: 0.5 %
KETONES UR STRIP.AUTO-MCNC: NEGATIVE MG/DL
LEUKOCYTE ESTERASE UR QL STRIP.AUTO: NEGATIVE
LYMPHOCYTES # BLD AUTO: 5.14 X10(3) UL (ref 1–4)
LYMPHOCYTES NFR BLD AUTO: 47.6 %
MCH RBC QN AUTO: 29.5 PG (ref 26–34)
MCHC RBC AUTO-ENTMCNC: 32.1 G/DL (ref 31–37)
MCV RBC AUTO: 92.1 FL
MONOCYTES # BLD AUTO: 0.41 X10(3) UL (ref 0.1–1)
MONOCYTES NFR BLD AUTO: 3.8 %
NEUTROPHILS # BLD AUTO: 5.12 X10 (3) UL (ref 1.5–7.7)
NEUTROPHILS # BLD AUTO: 5.12 X10(3) UL (ref 1.5–7.7)
NEUTROPHILS NFR BLD AUTO: 47.4 %
NITRITE UR QL STRIP.AUTO: NEGATIVE
NT-PROBNP SERPL-MCNC: 961 PG/ML (ref ?–450)
OSMOLALITY SERPL CALC.SUM OF ELEC: 294 MOSM/KG (ref 275–295)
OSMOLALITY SERPL CALC.SUM OF ELEC: 298 MOSM/KG (ref 275–295)
PH UR STRIP.AUTO: 6 [PH] (ref 5–8)
PLATELET # BLD AUTO: 410 10(3)UL (ref 150–450)
POTASSIUM SERPL-SCNC: 4.1 MMOL/L (ref 3.5–5.1)
POTASSIUM SERPL-SCNC: 4.7 MMOL/L (ref 3.5–5.1)
PROT SERPL-MCNC: 5.3 G/DL (ref 6.4–8.2)
PROT SERPL-MCNC: 5.7 G/DL (ref 6.4–8.2)
PROT UR STRIP.AUTO-MCNC: >=300 MG/DL
PROT UR-MCNC: 490.3 MG/DL
PROT/CREAT UR-RTO: 15.23
RBC # BLD AUTO: 3.15 X10(6)UL
SARS-COV-2 RNA RESP QL NAA+PROBE: NOT DETECTED
SODIUM SERPL-SCNC: 137 MMOL/L (ref 136–145)
SODIUM SERPL-SCNC: 139 MMOL/L (ref 136–145)
SP GR UR STRIP.AUTO: 1.02 (ref 1–1.03)
UROBILINOGEN UR STRIP.AUTO-MCNC: 1 MG/DL
WBC # BLD AUTO: 10.8 X10(3) UL (ref 4–11)

## 2022-08-24 PROCEDURE — 99223 1ST HOSP IP/OBS HIGH 75: CPT | Performed by: INTERNAL MEDICINE

## 2022-08-24 RX ORDER — MYCOPHENOLATE MOFETIL 250 MG/1
1000 CAPSULE ORAL
Status: DISCONTINUED | OUTPATIENT
Start: 2022-08-24 | End: 2022-08-27

## 2022-08-24 RX ORDER — FUROSEMIDE 10 MG/ML
40 INJECTION INTRAMUSCULAR; INTRAVENOUS ONCE
Status: COMPLETED | OUTPATIENT
Start: 2022-08-24 | End: 2022-08-24

## 2022-08-24 RX ORDER — DEXTROSE MONOHYDRATE 25 G/50ML
50 INJECTION, SOLUTION INTRAVENOUS
Status: DISCONTINUED | OUTPATIENT
Start: 2022-08-24 | End: 2022-08-27

## 2022-08-24 RX ORDER — PREDNISONE 10 MG/1
10 TABLET ORAL
Status: DISCONTINUED | OUTPATIENT
Start: 2022-08-25 | End: 2022-08-27

## 2022-08-24 RX ORDER — CIPROFLOXACIN 500 MG/1
500 TABLET, FILM COATED ORAL
Status: DISCONTINUED | OUTPATIENT
Start: 2022-08-24 | End: 2022-08-26

## 2022-08-24 RX ORDER — TORSEMIDE 20 MG/1
40 TABLET ORAL 2 TIMES DAILY
COMMUNITY
End: 2022-09-06

## 2022-08-24 RX ORDER — NICOTINE POLACRILEX 4 MG
30 LOZENGE BUCCAL
Status: DISCONTINUED | OUTPATIENT
Start: 2022-08-24 | End: 2022-08-27

## 2022-08-24 RX ORDER — SULFAMETHOXAZOLE AND TRIMETHOPRIM 800; 160 MG/1; MG/1
1 TABLET ORAL
Status: DISCONTINUED | OUTPATIENT
Start: 2022-08-24 | End: 2022-08-27

## 2022-08-24 RX ORDER — ONDANSETRON 2 MG/ML
4 INJECTION INTRAMUSCULAR; INTRAVENOUS EVERY 6 HOURS PRN
Status: DISCONTINUED | OUTPATIENT
Start: 2022-08-24 | End: 2022-08-27

## 2022-08-24 RX ORDER — LOSARTAN POTASSIUM 25 MG/1
25 TABLET ORAL DAILY
Status: DISCONTINUED | OUTPATIENT
Start: 2022-08-25 | End: 2022-08-27

## 2022-08-24 RX ORDER — NICOTINE POLACRILEX 4 MG
15 LOZENGE BUCCAL
Status: DISCONTINUED | OUTPATIENT
Start: 2022-08-24 | End: 2022-08-27

## 2022-08-24 RX ORDER — PANTOPRAZOLE SODIUM 20 MG/1
20 TABLET, DELAYED RELEASE ORAL
Status: DISCONTINUED | OUTPATIENT
Start: 2022-08-25 | End: 2022-08-27

## 2022-08-24 RX ORDER — CLOPIDOGREL BISULFATE 75 MG/1
75 TABLET ORAL NIGHTLY
Status: DISCONTINUED | OUTPATIENT
Start: 2022-08-24 | End: 2022-08-27

## 2022-08-24 RX ORDER — MELATONIN
3 NIGHTLY PRN
Status: DISCONTINUED | OUTPATIENT
Start: 2022-08-24 | End: 2022-08-27

## 2022-08-24 RX ORDER — ENOXAPARIN SODIUM 100 MG/ML
40 INJECTION SUBCUTANEOUS NIGHTLY
Status: DISCONTINUED | OUTPATIENT
Start: 2022-08-24 | End: 2022-08-26

## 2022-08-24 RX ORDER — ESCITALOPRAM OXALATE 10 MG/1
10 TABLET ORAL DAILY
Refills: 1 | Status: DISCONTINUED | OUTPATIENT
Start: 2022-08-24 | End: 2022-08-27

## 2022-08-24 RX ORDER — ACETAMINOPHEN 500 MG
1000 TABLET ORAL EVERY 6 HOURS PRN
COMMUNITY

## 2022-08-24 RX ORDER — ATORVASTATIN CALCIUM 10 MG/1
10 TABLET, FILM COATED ORAL NIGHTLY
Status: DISCONTINUED | OUTPATIENT
Start: 2022-08-24 | End: 2022-08-27

## 2022-08-24 RX ORDER — ACETAMINOPHEN 500 MG
500 TABLET ORAL EVERY 4 HOURS PRN
Status: DISCONTINUED | OUTPATIENT
Start: 2022-08-24 | End: 2022-08-27

## 2022-08-24 RX ORDER — ALBUMIN (HUMAN) 12.5 G/50ML
25 SOLUTION INTRAVENOUS EVERY 8 HOURS
Status: DISCONTINUED | OUTPATIENT
Start: 2022-08-24 | End: 2022-08-27

## 2022-08-24 NOTE — PLAN OF CARE
NURSING ADMISSION NOTE      Patient admitted via Cart  Oriented to room. Safety precautions initiated. Bed in low position. Call light in reach. Pt received alert and oriented x4, denies pain. Dyspnea with exertion. 3+ edema to legs/feet, 2+ to bilateral upper extremities. VSS, afebrile. Lasix infusion initiated. Tolerating diet. Navigator completed. MD paged for orders.

## 2022-08-24 NOTE — ED INITIAL ASSESSMENT (HPI)
Patient reports he is here for swelling to bilateral arms and legs, and abdomen. Hx of the same in July, was admitted at that time.  + SOB.

## 2022-08-24 NOTE — ED QUICK NOTES
Orders for admission, patient is aware of plan and ready to go upstairs. Any questions, please call ED RN Christiano Morfin  at extension [phone]. Vaccinated? yes  Type of COVID test sent:rapid  COVID Suspicion level: Low/      Titratable drug(s) infusing:none  Rate:    LOC at time of transport:alert    Other pertinent information:    CIWA score=4  NIH score=

## 2022-08-25 DIAGNOSIS — R80.9 NEPHROTIC RANGE PROTEINURIA: ICD-10-CM

## 2022-08-25 DIAGNOSIS — R89.7 PODOCYTE FOOT PROCESS EFFACEMENT PRESENT ON ELECTRON MICROSCOPY OF RENAL BIOPSY: ICD-10-CM

## 2022-08-25 LAB
ANION GAP SERPL CALC-SCNC: 5 MMOL/L (ref 0–18)
BASOPHILS # BLD AUTO: 0.01 X10(3) UL (ref 0–0.2)
BASOPHILS NFR BLD AUTO: 0.1 %
BUN BLD-MCNC: 30 MG/DL (ref 7–18)
CALCIUM BLD-MCNC: 8.5 MG/DL (ref 8.5–10.1)
CHLORIDE SERPL-SCNC: 105 MMOL/L (ref 98–112)
CO2 SERPL-SCNC: 26 MMOL/L (ref 21–32)
CREAT BLD-MCNC: 1.5 MG/DL
DEPRECATED HBV CORE AB SER IA-ACNC: 900.6 NG/ML
EOSINOPHIL # BLD AUTO: 0.04 X10(3) UL (ref 0–0.7)
EOSINOPHIL NFR BLD AUTO: 0.5 %
ERYTHROCYTE [DISTWIDTH] IN BLOOD BY AUTOMATED COUNT: 14.4 %
FOLATE SERPL-MCNC: 37.9 NG/ML (ref 8.7–?)
GFR SERPLBLD BASED ON 1.73 SQ M-ARVRAT: 47 ML/MIN/1.73M2 (ref 60–?)
GLUCOSE BLD-MCNC: 108 MG/DL (ref 70–99)
GLUCOSE BLD-MCNC: 119 MG/DL (ref 70–99)
GLUCOSE BLD-MCNC: 157 MG/DL (ref 70–99)
GLUCOSE BLD-MCNC: 167 MG/DL (ref 70–99)
GLUCOSE BLD-MCNC: 64 MG/DL (ref 70–99)
GLUCOSE BLD-MCNC: 90 MG/DL (ref 70–99)
HCT VFR BLD AUTO: 25 %
HGB BLD-MCNC: 7.9 G/DL
IMM GRANULOCYTES # BLD AUTO: 0.03 X10(3) UL (ref 0–1)
IMM GRANULOCYTES NFR BLD: 0.3 %
IRON SATN MFR SERPL: 19 %
IRON SERPL-MCNC: 23 UG/DL
LDH SERPL L TO P-CCNC: 162 U/L
LYMPHOCYTES # BLD AUTO: 4.49 X10(3) UL (ref 1–4)
LYMPHOCYTES NFR BLD AUTO: 51.4 %
MAGNESIUM SERPL-MCNC: 1.8 MG/DL (ref 1.6–2.6)
MCH RBC QN AUTO: 28.9 PG (ref 26–34)
MCHC RBC AUTO-ENTMCNC: 31.6 G/DL (ref 31–37)
MCV RBC AUTO: 91.6 FL
MONOCYTES # BLD AUTO: 0.37 X10(3) UL (ref 0.1–1)
MONOCYTES NFR BLD AUTO: 4.2 %
NEUTROPHILS # BLD AUTO: 3.79 X10 (3) UL (ref 1.5–7.7)
NEUTROPHILS # BLD AUTO: 3.79 X10(3) UL (ref 1.5–7.7)
NEUTROPHILS NFR BLD AUTO: 43.5 %
OSMOLALITY SERPL CALC.SUM OF ELEC: 289 MOSM/KG (ref 275–295)
PLATELET # BLD AUTO: 341 10(3)UL (ref 150–450)
POTASSIUM SERPL-SCNC: 3.6 MMOL/L (ref 3.5–5.1)
RBC # BLD AUTO: 2.73 X10(6)UL
SODIUM SERPL-SCNC: 136 MMOL/L (ref 136–145)
TIBC SERPL-MCNC: 118 UG/DL (ref 240–450)
TRANSFERRIN SERPL-MCNC: 79 MG/DL (ref 200–360)
TSI SER-ACNC: 1.03 MIU/ML (ref 0.36–3.74)
VIT B12 SERPL-MCNC: 661 PG/ML (ref 193–986)
WBC # BLD AUTO: 8.7 X10(3) UL (ref 4–11)

## 2022-08-25 PROCEDURE — 99223 1ST HOSP IP/OBS HIGH 75: CPT | Performed by: INTERNAL MEDICINE

## 2022-08-25 PROCEDURE — 99232 SBSQ HOSP IP/OBS MODERATE 35: CPT | Performed by: INTERNAL MEDICINE

## 2022-08-25 PROCEDURE — 99233 SBSQ HOSP IP/OBS HIGH 50: CPT | Performed by: INTERNAL MEDICINE

## 2022-08-25 RX ORDER — POTASSIUM CHLORIDE 20 MEQ/1
40 TABLET, EXTENDED RELEASE ORAL ONCE
Status: COMPLETED | OUTPATIENT
Start: 2022-08-25 | End: 2022-08-25

## 2022-08-25 NOTE — PLAN OF CARE
Patient is alert and oriented, RA, no complaints of pain or SOB. Pt still swollen, on Lasix gtt, tolerating well, good urine output. No other complaints overnight. Will continue to monitor.      Problem: RESPIRATORY - ADULT  Goal: Achieves optimal ventilation and oxygenation  Description: INTERVENTIONS:  - Assess for changes in respiratory status  - Assess for changes in mentation and behavior  - Position to facilitate oxygenation and minimize respiratory effort  - Oxygen supplementation based on oxygen saturation or ABGs  - Provide Smoking Cessation handout, if applicable  - Encourage broncho-pulmonary hygiene including cough, deep breathe, Incentive Spirometry  - Assess the need for suctioning and perform as needed  - Assess and instruct to report SOB or any respiratory difficulty  - Respiratory Therapy support as indicated  - Manage/alleviate anxiety  - Monitor for signs/symptoms of CO2 retention  Outcome: Progressing

## 2022-08-26 ENCOUNTER — APPOINTMENT (OUTPATIENT)
Dept: HEMATOLOGY/ONCOLOGY | Facility: HOSPITAL | Age: 77
End: 2022-08-26
Attending: INTERNAL MEDICINE
Payer: MEDICARE

## 2022-08-26 ENCOUNTER — APPOINTMENT (OUTPATIENT)
Dept: ULTRASOUND IMAGING | Facility: HOSPITAL | Age: 77
DRG: 683 | End: 2022-08-26
Attending: INTERNAL MEDICINE
Payer: MEDICARE

## 2022-08-26 ENCOUNTER — APPOINTMENT (OUTPATIENT)
Dept: ULTRASOUND IMAGING | Facility: HOSPITAL | Age: 77
End: 2022-08-26
Attending: INTERNAL MEDICINE
Payer: MEDICARE

## 2022-08-26 LAB
ANION GAP SERPL CALC-SCNC: 4 MMOL/L (ref 0–18)
BUN BLD-MCNC: 29 MG/DL (ref 7–18)
CALCIUM BLD-MCNC: 8.3 MG/DL (ref 8.5–10.1)
CHLORIDE SERPL-SCNC: 102 MMOL/L (ref 98–112)
CO2 SERPL-SCNC: 27 MMOL/L (ref 21–32)
CREAT BLD-MCNC: 1.78 MG/DL
GFR SERPLBLD BASED ON 1.73 SQ M-ARVRAT: 39 ML/MIN/1.73M2 (ref 60–?)
GLUCOSE BLD-MCNC: 100 MG/DL (ref 70–99)
GLUCOSE BLD-MCNC: 126 MG/DL (ref 70–99)
GLUCOSE BLD-MCNC: 171 MG/DL (ref 70–99)
GLUCOSE BLD-MCNC: 281 MG/DL (ref 70–99)
GLUCOSE BLD-MCNC: 89 MG/DL (ref 70–99)
GLUCOSE BLD-MCNC: 96 MG/DL (ref 70–99)
HAPTOGLOB SERPL-MCNC: 323 MG/DL (ref 30–200)
MAGNESIUM SERPL-MCNC: 1.8 MG/DL (ref 1.6–2.6)
OSMOLALITY SERPL CALC.SUM OF ELEC: 283 MOSM/KG (ref 275–295)
POTASSIUM SERPL-SCNC: 3.7 MMOL/L (ref 3.5–5.1)
SODIUM SERPL-SCNC: 133 MMOL/L (ref 136–145)

## 2022-08-26 PROCEDURE — 99232 SBSQ HOSP IP/OBS MODERATE 35: CPT | Performed by: INTERNAL MEDICINE

## 2022-08-26 PROCEDURE — 99233 SBSQ HOSP IP/OBS HIGH 50: CPT | Performed by: INTERNAL MEDICINE

## 2022-08-26 PROCEDURE — 93970 EXTREMITY STUDY: CPT | Performed by: INTERNAL MEDICINE

## 2022-08-26 RX ORDER — POTASSIUM CHLORIDE 20 MEQ/1
40 TABLET, EXTENDED RELEASE ORAL ONCE
Status: COMPLETED | OUTPATIENT
Start: 2022-08-26 | End: 2022-08-26

## 2022-08-26 RX ORDER — MYCOPHENOLATE MOFETIL 250 MG/1
1000 CAPSULE ORAL
Qty: 120 CAPSULE | Refills: 2 | Status: SHIPPED | OUTPATIENT
Start: 2022-08-26 | End: 2022-09-06

## 2022-08-26 NOTE — PLAN OF CARE
Problem: RESPIRATORY - ADULT  Goal: Achieves optimal ventilation and oxygenation  Description: INTERVENTIONS:  - Assess for changes in respiratory status  - Assess for changes in mentation and behavior  - Position to facilitate oxygenation and minimize respiratory effort  - Oxygen supplementation based on oxygen saturation or ABGs  - Provide Smoking Cessation handout, if applicable  - Encourage broncho-pulmonary hygiene including cough, deep breathe, Incentive Spirometry  - Assess the need for suctioning and perform as needed  - Assess and instruct to report SOB or any respiratory difficulty  - Respiratory Therapy support as indicated  - Manage/alleviate anxiety  - Monitor for signs/symptoms of CO2 retention  Outcome: Progressing     Problem: RESPIRATORY - ADULT  Goal: Achieves optimal ventilation and oxygenation  Description: INTERVENTIONS:  - Assess for changes in respiratory status  - Assess for changes in mentation and behavior  - Position to facilitate oxygenation and minimize respiratory effort  - Oxygen supplementation based on oxygen saturation or ABGs  - Provide Smoking Cessation handout, if applicable  - Encourage broncho-pulmonary hygiene including cough, deep breathe, Incentive Spirometry  - Assess the need for suctioning and perform as needed  - Assess and instruct to report SOB or any respiratory difficulty  - Respiratory Therapy support as indicated  - Manage/alleviate anxiety  - Monitor for signs/symptoms of CO2 retention  Outcome: Progressing

## 2022-08-26 NOTE — PLAN OF CARE
Problem: RESPIRATORY - ADULT  Goal: Achieves optimal ventilation and oxygenation  Description: INTERVENTIONS:  - Assess for changes in respiratory status  - Assess for changes in mentation and behavior  - Position to facilitate oxygenation and minimize respiratory effort  - Oxygen supplementation based on oxygen saturation or ABGs  - Provide Smoking Cessation handout, if applicable  - Encourage broncho-pulmonary hygiene including cough, deep breathe, Incentive Spirometry  - Assess the need for suctioning and perform as needed  - Assess and instruct to report SOB or any respiratory difficulty  - Respiratory Therapy support as indicated  - Manage/alleviate anxiety  - Monitor for signs/symptoms of CO2 retention  Outcome: Progressing     Problem: HYPOGLYCEMIA  Goal: Blood glucose WDL.  No signs or symptoms of hypoglycemia  Description: Interventions:  - Assess for risk factors of hypoglycemia  - Monitor for signs and symptoms of hypoglycemia  - Monitor blood glucose as ordered and per policy  - Administer IV glucose as ordered  - Change IV dextrose concentration, increase IV rate and/or feed infant as ordered  - Encourage  infants to feed frequently at least every 2-3 hours  Outcome: Progressing

## 2022-08-26 NOTE — PROGRESS NOTES
Patient is alert times four,has weeping from right arm, and has a continous iv lasix drip, right arm elevated on pillow. . Patient had an episode of hypoglycemia, at 1649, blood sugar was 64, received 8 glucose tablets and blood sugar increased to 90, notified Dr Jamie Morelos and she made changed to levemier insulin, no complaints of pain. Patient was very warm and diaphoretic with hypoglycemia, night shift will follow up with blood sugar checks.

## 2022-08-26 NOTE — PROGRESS NOTES
Patient is alert and oriented x4. VSS. Room air. Pleasant, calm and cooperative. Pt denied pain, nausea or SOB. Pt went to doppler US this morning and was found to have a right lower extremity DVT. Dr. Magnus Ramirez and Dr. Bryson fernández, patient started on eliquis 10 mg BID, given first dose at 1300. Lasix gtt continued, rate decreased to 2.5 ml/hr. Albumin infusions also continued q8, infusing now. Eliquis started today. Regular diet, tolerating all meals, insulin given with dinner for BG of 281. Pt denies complaints at this time. Will continue to monitor.

## 2022-08-26 NOTE — PLAN OF CARE
Patient still on Lasix drip, tolerating well, edema improving. No other complaints or acute events overnight. Will continue to monitor. Problem: RESPIRATORY - ADULT  Goal: Achieves optimal ventilation and oxygenation  Description: INTERVENTIONS:  - Assess for changes in respiratory status  - Assess for changes in mentation and behavior  - Position to facilitate oxygenation and minimize respiratory effort  - Oxygen supplementation based on oxygen saturation or ABGs  - Provide Smoking Cessation handout, if applicable  - Encourage broncho-pulmonary hygiene including cough, deep breathe, Incentive Spirometry  - Assess the need for suctioning and perform as needed  - Assess and instruct to report SOB or any respiratory difficulty  - Respiratory Therapy support as indicated  - Manage/alleviate anxiety  - Monitor for signs/symptoms of CO2 retention  Outcome: Progressing     Problem: HYPOGLYCEMIA  Goal: Blood glucose WDL.  No signs or symptoms of hypoglycemia  Description: Interventions:  - Assess for risk factors of hypoglycemia  - Monitor for signs and symptoms of hypoglycemia  - Monitor blood glucose as ordered and per policy  - Administer IV glucose as ordered  - Change IV dextrose concentration, increase IV rate and/or feed infant as ordered  - Encourage  infants to feed frequently at least every 2-3 hours  Outcome: Progressing

## 2022-08-27 VITALS
HEIGHT: 69 IN | WEIGHT: 178.81 LBS | OXYGEN SATURATION: 95 % | BODY MASS INDEX: 26.48 KG/M2 | DIASTOLIC BLOOD PRESSURE: 74 MMHG | TEMPERATURE: 98 F | RESPIRATION RATE: 16 BRPM | SYSTOLIC BLOOD PRESSURE: 159 MMHG | HEART RATE: 75 BPM

## 2022-08-27 LAB
ANION GAP SERPL CALC-SCNC: 5 MMOL/L (ref 0–18)
BUN BLD-MCNC: 29 MG/DL (ref 7–18)
CALCIUM BLD-MCNC: 8.3 MG/DL (ref 8.5–10.1)
CHLORIDE SERPL-SCNC: 102 MMOL/L (ref 98–112)
CO2 SERPL-SCNC: 29 MMOL/L (ref 21–32)
CREAT BLD-MCNC: 1.84 MG/DL
GFR SERPLBLD BASED ON 1.73 SQ M-ARVRAT: 37 ML/MIN/1.73M2 (ref 60–?)
GLUCOSE BLD-MCNC: 105 MG/DL (ref 70–99)
GLUCOSE BLD-MCNC: 106 MG/DL (ref 70–99)
OSMOLALITY SERPL CALC.SUM OF ELEC: 288 MOSM/KG (ref 275–295)
POTASSIUM SERPL-SCNC: 3.3 MMOL/L (ref 3.5–5.1)
SODIUM SERPL-SCNC: 136 MMOL/L (ref 136–145)

## 2022-08-27 PROCEDURE — 99233 SBSQ HOSP IP/OBS HIGH 50: CPT | Performed by: INTERNAL MEDICINE

## 2022-08-27 PROCEDURE — 99239 HOSP IP/OBS DSCHRG MGMT >30: CPT | Performed by: INTERNAL MEDICINE

## 2022-08-27 RX ORDER — POTASSIUM CHLORIDE 20 MEQ/1
40 TABLET, EXTENDED RELEASE ORAL ONCE
Status: COMPLETED | OUTPATIENT
Start: 2022-08-27 | End: 2022-08-27

## 2022-08-27 NOTE — PROGRESS NOTES
Patient alert and oriented x4. C/o severe headache this morning, given tylenol, c/o some relief and rated headache 4/10 upon reassessment. Pt ate breakfast and tolerated all medications per MAR, compliant and cooperative. Discharge order in place. Lasix gtt stopped. IV removed. Discharge instructions given and explained to patient, all questions answered. Patient educated on bleeding risk with elinilsa, education provided and patient demonstrated understanding. Pt received PO potassium per MAR prior to discharge per orders. Tele d/jocelyn, pt dressed and ready to go. All belongings packed and sent with patient. Pt transported home via private vehicle with family.

## 2022-08-27 NOTE — PLAN OF CARE
Patient alert and oriented X4. Afebrile. Room air. No SOB. Denies chest pain. No nausea and vomiting. Complained of headache, PRN Tylenol given with good relief. Maintained on IV Lasix and Albumin for generalized edema. Patient on external catheter [address] at night, with good amount of urine output. Blood Glucose monitored, No Insulin coverage given per MAR. Ambulatory. Fall precautions in place. Call light in reach. Problem: RESPIRATORY - ADULT  Goal: Achieves optimal ventilation and oxygenation  Description: INTERVENTIONS:  - Assess for changes in respiratory status  - Assess for changes in mentation and behavior  - Position to facilitate oxygenation and minimize respiratory effort  - Oxygen supplementation based on oxygen saturation or ABGs  - Provide Smoking Cessation handout, if applicable  - Encourage broncho-pulmonary hygiene including cough, deep breathe, Incentive Spirometry  - Assess the need for suctioning and perform as needed  - Assess and instruct to report SOB or any respiratory difficulty  - Respiratory Therapy support as indicated  - Manage/alleviate anxiety  - Monitor for signs/symptoms of CO2 retention  8/26/2022 2250 by Sherry Reynolds RN  Outcome: Progressing  8/26/2022 2249 by Sherry Reynolds RN  Outcome: Progressing     Problem: CARDIOVASCULAR - ADULT  Goal: Maintains optimal cardiac output and hemodynamic stability  Description: INTERVENTIONS:  - Monitor vital signs, rhythm, and trends  - Monitor for bleeding, hypotension and signs of decreased cardiac output  - Evaluate effectiveness of vasoactive medications to optimize hemodynamic stability  - Monitor arterial and/or venous puncture sites for bleeding and/or hematoma  - Assess quality of pulses, skin color and temperature  - Assess for signs of decreased coronary artery perfusion - ex.  Angina  - Evaluate fluid balance, assess for edema, trend weights  Outcome: Progressing  Goal: Absence of cardiac arrhythmias or at baseline  Description: INTERVENTIONS:  - Continuous cardiac monitoring, monitor vital signs, obtain 12 lead EKG if indicated  - Evaluate effectiveness of antiarrhythmic and heart rate control medications as ordered  - Initiate emergency measures for life threatening arrhythmias  - Monitor electrolytes and administer replacement therapy as ordered  Outcome: Progressing     Problem: METABOLIC/FLUID AND ELECTROLYTES - ADULT  Goal: Hemodynamic stability and optimal renal function maintained  Description: INTERVENTIONS:  - Monitor labs and assess for signs and symptoms of volume excess or deficit  - Monitor intake, output and patient weight  - Monitor urine specific gravity, serum osmolarity and serum sodium as indicated or ordered  - Monitor response to interventions for patient's volume status, including labs, urine output, blood pressure (other measures as available)  - Encourage oral intake as appropriate  - Instruct patient on fluid and nutrition restrictions as appropriate  Outcome: Progressing

## 2022-08-29 RX ORDER — METOLAZONE 5 MG/1
2.5 TABLET ORAL DAILY PRN
Qty: 15 TABLET | Refills: 1 | OUTPATIENT
Start: 2022-08-29 | End: 2022-09-28

## 2022-08-30 ENCOUNTER — OFFICE VISIT (OUTPATIENT)
Dept: HEMATOLOGY/ONCOLOGY | Age: 77
End: 2022-08-30
Attending: INTERNAL MEDICINE
Payer: MEDICARE

## 2022-08-30 VITALS
HEIGHT: 69.02 IN | HEART RATE: 120 BPM | SYSTOLIC BLOOD PRESSURE: 128 MMHG | DIASTOLIC BLOOD PRESSURE: 70 MMHG | BODY MASS INDEX: 27.21 KG/M2 | TEMPERATURE: 99 F | OXYGEN SATURATION: 93 % | WEIGHT: 183.69 LBS

## 2022-08-30 VITALS
DIASTOLIC BLOOD PRESSURE: 76 MMHG | SYSTOLIC BLOOD PRESSURE: 125 MMHG | RESPIRATION RATE: 16 BRPM | TEMPERATURE: 98 F | HEART RATE: 88 BPM

## 2022-08-30 DIAGNOSIS — C78.7 LIVER METASTASES (HCC): ICD-10-CM

## 2022-08-30 DIAGNOSIS — M79.89 LEG SWELLING: ICD-10-CM

## 2022-08-30 DIAGNOSIS — R59.9 ADENOPATHY: ICD-10-CM

## 2022-08-30 DIAGNOSIS — R80.9 NEPHROTIC RANGE PROTEINURIA: ICD-10-CM

## 2022-08-30 DIAGNOSIS — R89.7 PODOCYTE FOOT PROCESS EFFACEMENT PRESENT ON ELECTRON MICROSCOPY OF RENAL BIOPSY: ICD-10-CM

## 2022-08-30 DIAGNOSIS — C34.81 MALIGNANT NEOPLASM OF OVERLAPPING SITES OF RIGHT LUNG (HCC): Primary | ICD-10-CM

## 2022-08-30 DIAGNOSIS — T88.7XXA MEDICATION SIDE EFFECTS: ICD-10-CM

## 2022-08-30 LAB
ALBUMIN SERPL-MCNC: 2.2 G/DL (ref 3.4–5)
ALBUMIN/GLOB SERPL: 0.6 {RATIO} (ref 1–2)
ALP LIVER SERPL-CCNC: 85 U/L
ALT SERPL-CCNC: 16 U/L
ANION GAP SERPL CALC-SCNC: 8 MMOL/L (ref 0–18)
AST SERPL-CCNC: 17 U/L (ref 15–37)
BASOPHILS # BLD AUTO: 0.02 X10(3) UL (ref 0–0.2)
BASOPHILS NFR BLD AUTO: 0.1 %
BILIRUB SERPL-MCNC: 0.2 MG/DL (ref 0.1–2)
BUN BLD-MCNC: 53 MG/DL (ref 7–18)
CALCIUM BLD-MCNC: 8.9 MG/DL (ref 8.5–10.1)
CEA SERPL-MCNC: 5.4 NG/ML (ref ?–5)
CHLORIDE SERPL-SCNC: 98 MMOL/L (ref 98–112)
CO2 SERPL-SCNC: 27 MMOL/L (ref 21–32)
CREAT BLD-MCNC: 2.03 MG/DL
CREAT UR-SCNC: 70.1 MG/DL
EOSINOPHIL # BLD AUTO: 0.01 X10(3) UL (ref 0–0.7)
EOSINOPHIL NFR BLD AUTO: 0.1 %
ERYTHROCYTE [DISTWIDTH] IN BLOOD BY AUTOMATED COUNT: 14.4 %
FASTING STATUS PATIENT QL REPORTED: NO
GFR SERPLBLD BASED ON 1.73 SQ M-ARVRAT: 33 ML/MIN/1.73M2 (ref 60–?)
GLOBULIN PLAS-MCNC: 3.8 G/DL (ref 2.8–4.4)
GLUCOSE BLD-MCNC: 177 MG/DL (ref 70–99)
HCT VFR BLD AUTO: 28.9 %
HGB BLD-MCNC: 9.2 G/DL
IMM GRANULOCYTES # BLD AUTO: 0.05 X10(3) UL (ref 0–1)
IMM GRANULOCYTES NFR BLD: 0.3 %
LYMPHOCYTES # BLD AUTO: 5.51 X10(3) UL (ref 1–4)
LYMPHOCYTES NFR BLD AUTO: 37.7 %
MAGNESIUM SERPL-MCNC: 1.6 MG/DL (ref 1.6–2.6)
MCH RBC QN AUTO: 28.5 PG (ref 26–34)
MCHC RBC AUTO-ENTMCNC: 31.8 G/DL (ref 31–37)
MCV RBC AUTO: 89.5 FL
MONOCYTES # BLD AUTO: 0.34 X10(3) UL (ref 0.1–1)
MONOCYTES NFR BLD AUTO: 2.3 %
NEUTROPHILS # BLD AUTO: 8.7 X10 (3) UL (ref 1.5–7.7)
NEUTROPHILS # BLD AUTO: 8.7 X10(3) UL (ref 1.5–7.7)
NEUTROPHILS NFR BLD AUTO: 59.5 %
OSMOLALITY SERPL CALC.SUM OF ELEC: 295 MOSM/KG (ref 275–295)
PLATELET # BLD AUTO: 484 10(3)UL (ref 150–450)
POTASSIUM SERPL-SCNC: 3.9 MMOL/L (ref 3.5–5.1)
PROT SERPL-MCNC: 6 G/DL (ref 6.4–8.2)
PROT UR-MCNC: 657.9 MG/DL
RBC # BLD AUTO: 3.23 X10(6)UL
SODIUM SERPL-SCNC: 133 MMOL/L (ref 136–145)
WBC # BLD AUTO: 14.6 X10(3) UL (ref 4–11)

## 2022-08-30 PROCEDURE — 85025 COMPLETE CBC W/AUTO DIFF WBC: CPT | Performed by: INTERNAL MEDICINE

## 2022-08-30 PROCEDURE — 83735 ASSAY OF MAGNESIUM: CPT | Performed by: INTERNAL MEDICINE

## 2022-08-30 PROCEDURE — 80053 COMPREHEN METABOLIC PANEL: CPT | Performed by: INTERNAL MEDICINE

## 2022-08-30 PROCEDURE — 36415 COLL VENOUS BLD VENIPUNCTURE: CPT

## 2022-08-30 PROCEDURE — 82570 ASSAY OF URINE CREATININE: CPT | Performed by: INTERNAL MEDICINE

## 2022-08-30 PROCEDURE — 84156 ASSAY OF PROTEIN URINE: CPT | Performed by: INTERNAL MEDICINE

## 2022-08-30 PROCEDURE — 82378 CARCINOEMBRYONIC ANTIGEN: CPT | Performed by: INTERNAL MEDICINE

## 2022-08-30 NOTE — PROGRESS NOTES
Pt here for 1 month MD gottlieb/rebecca. Pt recently  In hospital for 3 nights for diuresis, per pt removed 17lbs. Energy level is still very low, appetite is low, but forces himself to eat. Pt notes occasional pain, takes Tylenol in am for back pain and again in afternoon PRN. Pt has SOB with exertion, also has coughing/sneezing fits. Pt still feels swollen, has skin tears on his arms d/t swelling.

## 2022-09-01 ENCOUNTER — APPOINTMENT (OUTPATIENT)
Dept: HEMATOLOGY/ONCOLOGY | Age: 77
End: 2022-09-01
Attending: INTERNAL MEDICINE
Payer: MEDICARE

## 2022-09-06 ENCOUNTER — OFFICE VISIT (OUTPATIENT)
Dept: NEPHROLOGY | Facility: CLINIC | Age: 77
End: 2022-09-06
Payer: MEDICARE

## 2022-09-06 ENCOUNTER — TELEPHONE (OUTPATIENT)
Dept: HEMATOLOGY/ONCOLOGY | Age: 77
End: 2022-09-06

## 2022-09-06 VITALS — WEIGHT: 181 LBS | SYSTOLIC BLOOD PRESSURE: 88 MMHG | DIASTOLIC BLOOD PRESSURE: 52 MMHG | BODY MASS INDEX: 27 KG/M2

## 2022-09-06 DIAGNOSIS — R80.9 NEPHROTIC RANGE PROTEINURIA: ICD-10-CM

## 2022-09-06 DIAGNOSIS — R89.7 PODOCYTE FOOT PROCESS EFFACEMENT PRESENT ON ELECTRON MICROSCOPY OF RENAL BIOPSY: ICD-10-CM

## 2022-09-06 PROCEDURE — 1111F DSCHRG MED/CURRENT MED MERGE: CPT | Performed by: INTERNAL MEDICINE

## 2022-09-06 PROCEDURE — 99214 OFFICE O/P EST MOD 30 MIN: CPT | Performed by: INTERNAL MEDICINE

## 2022-09-06 RX ORDER — TORSEMIDE 20 MG/1
40 TABLET ORAL 2 TIMES DAILY
Qty: 120 TABLET | Refills: 1 | Status: SHIPPED | OUTPATIENT
Start: 2022-09-06

## 2022-09-06 RX ORDER — CYCLOSPORINE 100 MG/1
CAPSULE, GELATIN COATED ORAL
Qty: 120 CAPSULE | Refills: 1 | Status: SHIPPED | OUTPATIENT
Start: 2022-09-06

## 2022-09-06 RX ORDER — PREDNISONE 1 MG/1
5 TABLET ORAL DAILY
Qty: 30 TABLET | Refills: 1 | Status: SHIPPED | OUTPATIENT
Start: 2022-09-06

## 2022-09-06 NOTE — TELEPHONE ENCOUNTER
Ej Adkins needs to discuss the sample for the lab that she received. Please call her at [phone].  Option 1

## 2022-09-08 ENCOUNTER — OFFICE VISIT (OUTPATIENT)
Dept: FAMILY MEDICINE CLINIC | Facility: CLINIC | Age: 77
End: 2022-09-08
Payer: MEDICARE

## 2022-09-08 VITALS
HEART RATE: 70 BPM | RESPIRATION RATE: 12 BRPM | TEMPERATURE: 98 F | SYSTOLIC BLOOD PRESSURE: 112 MMHG | DIASTOLIC BLOOD PRESSURE: 68 MMHG | OXYGEN SATURATION: 98 %

## 2022-09-08 DIAGNOSIS — S51.012A LACERATION OF SKIN OF LEFT ELBOW, INITIAL ENCOUNTER: ICD-10-CM

## 2022-09-08 DIAGNOSIS — S50.311A ABRASION OF RIGHT ELBOW, INITIAL ENCOUNTER: Primary | ICD-10-CM

## 2022-09-08 PROCEDURE — 99213 OFFICE O/P EST LOW 20 MIN: CPT | Performed by: PHYSICIAN ASSISTANT

## 2022-09-08 PROCEDURE — 1111F DSCHRG MED/CURRENT MED MERGE: CPT | Performed by: PHYSICIAN ASSISTANT

## 2022-09-08 NOTE — TELEPHONE ENCOUNTER
Toxicities:  Ipilimumab/Nivolumab on hold until [facility] results. Patient also taking Eliquis and Plavix    Left Elbow abrasion/skin tear: (Patient reports that he ran into a door 2 days ago and sustained an abrasion/skin tear by his left elbow. He has a 1 inch skin tear. He has been covering it with tegaderm, but the wound keeps oozing. Patient is concerned about the constant oozing, infection and healing. \"The tegaderm dressing is just not working. \" I recommended he present to the closest [facility] for evaluation. Alek Layne asked if he should hold either of his blood thinners? I told him I will forward the message to Dr Irene Degroot and her nurse.

## 2022-09-08 NOTE — TELEPHONE ENCOUNTER
Patient calling-  Ran into a luis alberto 2 days ago. Ripped a piece of skin of his left. Arm. Has been bleeding ever since.     Takes Eliquis

## 2022-09-08 NOTE — PATIENT INSTRUCTIONS
Clean wound lightly   Topical antibiotic ointment   Keep covered when out and for compression.  When bleeding is better controlled- ok to leave open to air at home   Monitor for redness, swelling, pus discharge, or fever- recheck if occurs   Only minimal bleeding in office, do not feel blood thinning medication needs to be held, but patient to follow up with PCP regarding this

## 2022-09-08 NOTE — TELEPHONE ENCOUNTER
I called Tim Tiwari and updated him that Dr Peg Marshall does not want him to stop his blood thinner. He may skip one dose of the eliquis. Tim Tiwari said he will go to the [facility] in [facility] so Dr Peg Marshall can see the doctor's notes. I told him I would update Dr Peg Marshall.

## 2022-09-13 ENCOUNTER — TELEPHONE (OUTPATIENT)
Dept: HEMATOLOGY/ONCOLOGY | Facility: HOSPITAL | Age: 77
End: 2022-09-13

## 2022-09-13 ENCOUNTER — APPOINTMENT (OUTPATIENT)
Dept: HEMATOLOGY/ONCOLOGY | Age: 77
End: 2022-09-13
Attending: INTERNAL MEDICINE
Payer: MEDICARE

## 2022-09-13 ENCOUNTER — TELEPHONE (OUTPATIENT)
Dept: NEPHROLOGY | Facility: CLINIC | Age: 77
End: 2022-09-13

## 2022-09-13 DIAGNOSIS — N04.9 NEPHROTIC SYNDROME: Primary | ICD-10-CM

## 2022-09-13 NOTE — TELEPHONE ENCOUNTER
----- Message from Chester Mtaamoros MD sent at 9/13/2022  9:19 AM CDT -----  Don't have the [facility] results yet. Delayed due to the holiday - should get it later this week.  Can cancel appointment today if ok with him and I will call him when I have results and arrange for f/u

## 2022-09-13 NOTE — TELEPHONE ENCOUNTER
Patient has pain in lower/middle of his back. Dr Holly Perez gave him Oxy to take before when the same thing happen. He still has the prescription and was wondering if he could take one for the pain? Please  Call. Thank you.

## 2022-09-13 NOTE — TELEPHONE ENCOUNTER
Called pt and let him know he can take pain medication he has at home, he will take one tonight and continue to monitor.

## 2022-09-13 NOTE — TELEPHONE ENCOUNTER
Called and spoke with Theresa Dunn, wife, agrees to cancel today's appointment. MD will call when results available and arrange f/u at that time.

## 2022-09-14 ENCOUNTER — OFFICE VISIT (OUTPATIENT)
Dept: HEMATOLOGY/ONCOLOGY | Facility: HOSPITAL | Age: 77
End: 2022-09-14
Attending: INTERNAL MEDICINE
Payer: MEDICARE

## 2022-09-14 VITALS
HEIGHT: 69.02 IN | OXYGEN SATURATION: 98 % | BODY MASS INDEX: 26.59 KG/M2 | HEART RATE: 84 BPM | TEMPERATURE: 98 F | RESPIRATION RATE: 18 BRPM | DIASTOLIC BLOOD PRESSURE: 65 MMHG | WEIGHT: 179.5 LBS | SYSTOLIC BLOOD PRESSURE: 106 MMHG

## 2022-09-14 DIAGNOSIS — C78.7 LIVER METASTASES (HCC): ICD-10-CM

## 2022-09-14 DIAGNOSIS — F32.9 REACTIVE DEPRESSION: ICD-10-CM

## 2022-09-14 DIAGNOSIS — C34.81 MALIGNANT NEOPLASM OF OVERLAPPING SITES OF RIGHT LUNG (HCC): Primary | ICD-10-CM

## 2022-09-14 DIAGNOSIS — M79.89 LEG SWELLING: ICD-10-CM

## 2022-09-14 DIAGNOSIS — R59.9 ADENOPATHY: ICD-10-CM

## 2022-09-14 DIAGNOSIS — D72.820 MONOCLONAL B-CELL LYMPHOCYTOSIS: ICD-10-CM

## 2022-09-14 DIAGNOSIS — R89.7 PODOCYTE FOOT PROCESS EFFACEMENT PRESENT ON ELECTRON MICROSCOPY OF RENAL BIOPSY: ICD-10-CM

## 2022-09-14 DIAGNOSIS — R13.19 OTHER DYSPHAGIA: ICD-10-CM

## 2022-09-14 DIAGNOSIS — T88.7XXA MEDICATION SIDE EFFECTS: ICD-10-CM

## 2022-09-14 DIAGNOSIS — R80.9 NEPHROTIC RANGE PROTEINURIA: ICD-10-CM

## 2022-09-14 PROCEDURE — 99215 OFFICE O/P EST HI 40 MIN: CPT | Performed by: INTERNAL MEDICINE

## 2022-09-16 ENCOUNTER — TELEPHONE (OUTPATIENT)
Dept: HEMATOLOGY/ONCOLOGY | Facility: HOSPITAL | Age: 77
End: 2022-09-16

## 2022-09-16 NOTE — TELEPHONE ENCOUNTER
Patient's wife called to inform Dr Delfina Kim that the patient had an egd scheduled for 9/19/22 and needs clearance to be off of Eliquis from Dr Delfina Kim. The patient said they need a call back today so they can confirm mondays appointment.

## 2022-09-21 ENCOUNTER — TELEPHONE (OUTPATIENT)
Dept: HEMATOLOGY/ONCOLOGY | Facility: HOSPITAL | Age: 77
End: 2022-09-21

## 2022-09-21 ENCOUNTER — TELEPHONE (OUTPATIENT)
Dept: NEPHROLOGY | Facility: CLINIC | Age: 77
End: 2022-09-21

## 2022-09-21 NOTE — TELEPHONE ENCOUNTER
Pt had endoscopy done on 9/19 @ [facility] TAISHA ESQUEDA results in [address] [place] [address], would like you to please review and give him a call. Also he was prescribed fluconazole tablets and want to make sure you are ok with him taking.

## 2022-09-21 NOTE — TELEPHONE ENCOUNTER
Patient calling and had Endoscopy this past Monday. Of which he has an infection.     Dr has given him medication  FLUTONKZOLE    Wanted to make sure with Dr Benjamín Hope  This is OK to take      Please call patient

## 2022-09-23 ENCOUNTER — LAB ENCOUNTER (OUTPATIENT)
Dept: LAB | Age: 77
End: 2022-09-23
Attending: INTERNAL MEDICINE

## 2022-09-23 DIAGNOSIS — N04.9 NEPHROTIC SYNDROME: ICD-10-CM

## 2022-09-23 DIAGNOSIS — C34.81 MALIGNANT NEOPLASM OF OVERLAPPING SITES OF RIGHT LUNG (HCC): ICD-10-CM

## 2022-09-23 DIAGNOSIS — R80.9 NEPHROTIC RANGE PROTEINURIA: ICD-10-CM

## 2022-09-23 LAB
ALBUMIN SERPL-MCNC: 1.5 G/DL (ref 3.4–5)
ALBUMIN/GLOB SERPL: 0.3 {RATIO} (ref 1–2)
ALP LIVER SERPL-CCNC: 107 U/L
ALT SERPL-CCNC: 21 U/L
ANION GAP SERPL CALC-SCNC: 12 MMOL/L (ref 0–18)
AST SERPL-CCNC: 23 U/L (ref 15–37)
BASOPHILS # BLD AUTO: 0.02 X10(3) UL (ref 0–0.2)
BASOPHILS NFR BLD AUTO: 0.1 %
BILIRUB SERPL-MCNC: 0.5 MG/DL (ref 0.1–2)
BILIRUB UR QL STRIP.AUTO: NEGATIVE
BUN BLD-MCNC: 68 MG/DL (ref 7–18)
CALCIUM BLD-MCNC: 9.4 MG/DL (ref 8.5–10.1)
CEA SERPL-MCNC: 8.1 NG/ML (ref ?–5)
CHLORIDE SERPL-SCNC: 95 MMOL/L (ref 98–112)
CLARITY UR REFRACT.AUTO: CLEAR
CO2 SERPL-SCNC: 27 MMOL/L (ref 21–32)
COLOR UR AUTO: YELLOW
CREAT BLD-MCNC: 1.91 MG/DL
CREAT UR-SCNC: 86.8 MG/DL
EOSINOPHIL # BLD AUTO: 0.01 X10(3) UL (ref 0–0.7)
EOSINOPHIL NFR BLD AUTO: 0.1 %
ERYTHROCYTE [DISTWIDTH] IN BLOOD BY AUTOMATED COUNT: 14 %
FASTING STATUS PATIENT QL REPORTED: YES
GFR SERPLBLD BASED ON 1.73 SQ M-ARVRAT: 35 ML/MIN/1.73M2 (ref 60–?)
GLOBULIN PLAS-MCNC: 4.9 G/DL (ref 2.8–4.4)
GLUCOSE BLD-MCNC: 136 MG/DL (ref 70–99)
GLUCOSE UR STRIP.AUTO-MCNC: NEGATIVE MG/DL
HCT VFR BLD AUTO: 31.4 %
HGB BLD-MCNC: 9.9 G/DL
HYALINE CASTS #/AREA URNS AUTO: PRESENT /LPF
IMM GRANULOCYTES # BLD AUTO: 0.06 X10(3) UL (ref 0–1)
IMM GRANULOCYTES NFR BLD: 0.4 %
KETONES UR STRIP.AUTO-MCNC: NEGATIVE MG/DL
LDH SERPL L TO P-CCNC: 204 U/L
LEUKOCYTE ESTERASE UR QL STRIP.AUTO: NEGATIVE
LYMPHOCYTES # BLD AUTO: 6.46 X10(3) UL (ref 1–4)
LYMPHOCYTES NFR BLD AUTO: 41 %
MAGNESIUM SERPL-MCNC: 1.7 MG/DL (ref 1.6–2.6)
MCH RBC QN AUTO: 28.6 PG (ref 26–34)
MCHC RBC AUTO-ENTMCNC: 31.5 G/DL (ref 31–37)
MCV RBC AUTO: 90.8 FL
MONOCYTES # BLD AUTO: 0.61 X10(3) UL (ref 0.1–1)
MONOCYTES NFR BLD AUTO: 3.9 %
NEUTROPHILS # BLD AUTO: 8.59 X10 (3) UL (ref 1.5–7.7)
NEUTROPHILS # BLD AUTO: 8.59 X10(3) UL (ref 1.5–7.7)
NEUTROPHILS NFR BLD AUTO: 54.5 %
NITRITE UR QL STRIP.AUTO: NEGATIVE
OSMOLALITY SERPL CALC.SUM OF ELEC: 300 MOSM/KG (ref 275–295)
PH UR STRIP.AUTO: 5.5 [PH] (ref 5–8)
PLATELET # BLD AUTO: 629 10(3)UL (ref 150–450)
POTASSIUM SERPL-SCNC: 3.8 MMOL/L (ref 3.5–5.1)
PROT SERPL-MCNC: 6.4 G/DL (ref 6.4–8.2)
PROT UR STRIP.AUTO-MCNC: >=300 MG/DL
PROT UR-MCNC: 247.7 MG/DL
RBC # BLD AUTO: 3.46 X10(6)UL
SODIUM SERPL-SCNC: 134 MMOL/L (ref 136–145)
SP GR UR STRIP.AUTO: 1.02 (ref 1–1.03)
UROBILINOGEN UR STRIP.AUTO-MCNC: 1 MG/DL
WBC # BLD AUTO: 15.8 X10(3) UL (ref 4–11)

## 2022-09-23 PROCEDURE — 82378 CARCINOEMBRYONIC ANTIGEN: CPT

## 2022-09-23 PROCEDURE — 81001 URINALYSIS AUTO W/SCOPE: CPT

## 2022-09-23 PROCEDURE — 80158 DRUG ASSAY CYCLOSPORINE: CPT

## 2022-09-23 PROCEDURE — 36415 COLL VENOUS BLD VENIPUNCTURE: CPT

## 2022-09-23 PROCEDURE — 83615 LACTATE (LD) (LDH) ENZYME: CPT

## 2022-09-23 PROCEDURE — 84156 ASSAY OF PROTEIN URINE: CPT

## 2022-09-23 PROCEDURE — 81015 MICROSCOPIC EXAM OF URINE: CPT

## 2022-09-23 PROCEDURE — 80053 COMPREHEN METABOLIC PANEL: CPT

## 2022-09-23 PROCEDURE — 83735 ASSAY OF MAGNESIUM: CPT

## 2022-09-23 PROCEDURE — 82570 ASSAY OF URINE CREATININE: CPT

## 2022-09-23 PROCEDURE — 85025 COMPLETE CBC W/AUTO DIFF WBC: CPT

## 2022-09-24 LAB — CYCLOSPORINE A: 229.8 NG/ML

## 2022-09-25 ENCOUNTER — APPOINTMENT (OUTPATIENT)
Dept: GENERAL RADIOLOGY | Facility: HOSPITAL | Age: 77
End: 2022-09-25
Attending: INTERNAL MEDICINE

## 2022-09-25 ENCOUNTER — APPOINTMENT (OUTPATIENT)
Dept: CT IMAGING | Facility: HOSPITAL | Age: 77
End: 2022-09-25
Attending: INTERNAL MEDICINE

## 2022-09-25 ENCOUNTER — HOSPITAL ENCOUNTER (OUTPATIENT)
Facility: HOSPITAL | Age: 77
Setting detail: OBSERVATION
Discharge: HOME HEALTH | End: 2022-09-28
Attending: INTERNAL MEDICINE | Admitting: INTERNAL MEDICINE

## 2022-09-25 DIAGNOSIS — E11.59 TYPE 2 DIABETES MELLITUS WITH OTHER CIRCULATORY COMPLICATION, WITH LONG-TERM CURRENT USE OF INSULIN (HCC): ICD-10-CM

## 2022-09-25 DIAGNOSIS — I15.2 HYPERTENSION ASSOCIATED WITH DIABETES (HCC): ICD-10-CM

## 2022-09-25 DIAGNOSIS — R80.9 NEPHROTIC RANGE PROTEINURIA: ICD-10-CM

## 2022-09-25 DIAGNOSIS — E78.5 HYPERLIPIDEMIA ASSOCIATED WITH TYPE 2 DIABETES MELLITUS (HCC): ICD-10-CM

## 2022-09-25 DIAGNOSIS — E11.59 HYPERTENSION ASSOCIATED WITH DIABETES (HCC): ICD-10-CM

## 2022-09-25 DIAGNOSIS — I82.4Y1 ACUTE DEEP VEIN THROMBOSIS (DVT) OF PROXIMAL VEIN OF RIGHT LOWER EXTREMITY (HCC): Primary | ICD-10-CM

## 2022-09-25 DIAGNOSIS — E11.69 HYPERLIPIDEMIA ASSOCIATED WITH TYPE 2 DIABETES MELLITUS (HCC): ICD-10-CM

## 2022-09-25 DIAGNOSIS — Z79.4 TYPE 2 DIABETES MELLITUS WITH OTHER CIRCULATORY COMPLICATION, WITH LONG-TERM CURRENT USE OF INSULIN (HCC): ICD-10-CM

## 2022-09-25 DIAGNOSIS — E11.51 DIABETES MELLITUS WITH PERIPHERAL ANGIOPATHY (HCC): ICD-10-CM

## 2022-09-25 PROBLEM — R06.02 SOB (SHORTNESS OF BREATH): Status: ACTIVE | Noted: 2022-01-01

## 2022-09-25 PROBLEM — R06.02 SOB (SHORTNESS OF BREATH): Status: ACTIVE | Noted: 2022-09-25

## 2022-09-25 PROBLEM — C34.90 MALIGNANT NEOPLASM OF LUNG (HCC): Status: ACTIVE | Noted: 2019-12-13

## 2022-09-25 LAB
ADENOVIRUS PCR:: NOT DETECTED
ALBUMIN SERPL-MCNC: 1.4 G/DL (ref 3.4–5)
ALBUMIN/GLOB SERPL: 0.3 {RATIO} (ref 1–2)
ALP LIVER SERPL-CCNC: 94 U/L
ALT SERPL-CCNC: 17 U/L
ANION GAP SERPL CALC-SCNC: 9 MMOL/L (ref 0–18)
AST SERPL-CCNC: 23 U/L (ref 15–37)
B PARAPERT DNA SPEC QL NAA+PROBE: NOT DETECTED
B PERT DNA SPEC QL NAA+PROBE: NOT DETECTED
BASOPHILS # BLD AUTO: 0.01 X10(3) UL (ref 0–0.2)
BASOPHILS NFR BLD AUTO: 0.1 %
BILIRUB SERPL-MCNC: 0.5 MG/DL (ref 0.1–2)
BILIRUB UR QL STRIP.AUTO: NEGATIVE
BUN BLD-MCNC: 79 MG/DL (ref 7–18)
C PNEUM DNA SPEC QL NAA+PROBE: NOT DETECTED
CALCIUM BLD-MCNC: 8.8 MG/DL (ref 8.5–10.1)
CHLORIDE SERPL-SCNC: 94 MMOL/L (ref 98–112)
CLARITY UR REFRACT.AUTO: CLEAR
CO2 SERPL-SCNC: 27 MMOL/L (ref 21–32)
COLOR UR AUTO: YELLOW
CORONAVIRUS 229E PCR:: NOT DETECTED
CORONAVIRUS HKU1 PCR:: NOT DETECTED
CORONAVIRUS NL63 PCR:: NOT DETECTED
CORONAVIRUS OC43 PCR:: NOT DETECTED
CREAT BLD-MCNC: 1.67 MG/DL
CREAT UR-SCNC: 78 MG/DL
EOSINOPHIL # BLD AUTO: 0.01 X10(3) UL (ref 0–0.7)
EOSINOPHIL NFR BLD AUTO: 0.1 %
ERYTHROCYTE [DISTWIDTH] IN BLOOD BY AUTOMATED COUNT: 14.1 %
FLUAV RNA SPEC QL NAA+PROBE: NOT DETECTED
FLUBV RNA SPEC QL NAA+PROBE: NOT DETECTED
GFR SERPLBLD BASED ON 1.73 SQ M-ARVRAT: 42 ML/MIN/1.73M2 (ref 60–?)
GLOBULIN PLAS-MCNC: 4.4 G/DL (ref 2.8–4.4)
GLUCOSE BLD-MCNC: 111 MG/DL (ref 70–99)
GLUCOSE BLD-MCNC: 135 MG/DL (ref 70–99)
GLUCOSE BLD-MCNC: 148 MG/DL (ref 70–99)
GLUCOSE BLD-MCNC: 159 MG/DL (ref 70–99)
GLUCOSE UR STRIP.AUTO-MCNC: NEGATIVE MG/DL
HCT VFR BLD AUTO: 27.3 %
HGB BLD-MCNC: 8.8 G/DL
HYALINE CASTS #/AREA URNS AUTO: PRESENT /LPF
IMM GRANULOCYTES # BLD AUTO: 0.05 X10(3) UL (ref 0–1)
IMM GRANULOCYTES NFR BLD: 0.4 %
INR BLD: 1.51 (ref 0.85–1.16)
KETONES UR STRIP.AUTO-MCNC: NEGATIVE MG/DL
LEUKOCYTE ESTERASE UR QL STRIP.AUTO: NEGATIVE
LYMPHOCYTES # BLD AUTO: 5.62 X10(3) UL (ref 1–4)
LYMPHOCYTES NFR BLD AUTO: 44.9 %
MAGNESIUM SERPL-MCNC: 1.4 MG/DL (ref 1.6–2.6)
MCH RBC QN AUTO: 28.6 PG (ref 26–34)
MCHC RBC AUTO-ENTMCNC: 32.2 G/DL (ref 31–37)
MCV RBC AUTO: 88.6 FL
METAPNEUMOVIRUS PCR:: NOT DETECTED
MONOCYTES # BLD AUTO: 0.51 X10(3) UL (ref 0.1–1)
MONOCYTES NFR BLD AUTO: 4.1 %
MYCOPLASMA PNEUMONIA PCR:: NOT DETECTED
NEUTROPHILS # BLD AUTO: 6.32 X10 (3) UL (ref 1.5–7.7)
NEUTROPHILS # BLD AUTO: 6.32 X10(3) UL (ref 1.5–7.7)
NEUTROPHILS NFR BLD AUTO: 50.4 %
NITRITE UR QL STRIP.AUTO: NEGATIVE
OSMOLALITY SERPL CALC.SUM OF ELEC: 296 MOSM/KG (ref 275–295)
PARAINFLUENZA 1 PCR:: NOT DETECTED
PARAINFLUENZA 2 PCR:: NOT DETECTED
PARAINFLUENZA 3 PCR:: NOT DETECTED
PARAINFLUENZA 4 PCR:: NOT DETECTED
PH UR STRIP.AUTO: 5.5 [PH] (ref 5–8)
PLATELET # BLD AUTO: 519 10(3)UL (ref 150–450)
POTASSIUM SERPL-SCNC: 3.5 MMOL/L (ref 3.5–5.1)
PROT SERPL-MCNC: 5.8 G/DL (ref 6.4–8.2)
PROT UR STRIP.AUTO-MCNC: >=300 MG/DL
PROT UR-MCNC: 200.2 MG/DL
PROTHROMBIN TIME: 18.1 SECONDS (ref 11.6–14.8)
RBC # BLD AUTO: 3.08 X10(6)UL
RHINOVIRUS/ENTERO PCR:: NOT DETECTED
RSV RNA SPEC QL NAA+PROBE: NOT DETECTED
SARS-COV-2 RNA NPH QL NAA+NON-PROBE: NOT DETECTED
SODIUM SERPL-SCNC: 130 MMOL/L (ref 136–145)
SP GR UR STRIP.AUTO: 1.01 (ref 1–1.03)
UROBILINOGEN UR STRIP.AUTO-MCNC: 1 MG/DL
WBC # BLD AUTO: 12.5 X10(3) UL (ref 4–11)

## 2022-09-25 PROCEDURE — 99223 1ST HOSP IP/OBS HIGH 75: CPT | Performed by: INTERNAL MEDICINE

## 2022-09-25 PROCEDURE — 71045 X-RAY EXAM CHEST 1 VIEW: CPT | Performed by: INTERNAL MEDICINE

## 2022-09-25 PROCEDURE — 70450 CT HEAD/BRAIN W/O DYE: CPT | Performed by: INTERNAL MEDICINE

## 2022-09-25 PROCEDURE — 99220 INITIAL OBSERVATION CARE,LEVL III: CPT | Performed by: INTERNAL MEDICINE

## 2022-09-25 RX ORDER — SENNOSIDES 8.6 MG
17.2 TABLET ORAL NIGHTLY PRN
Status: DISCONTINUED | OUTPATIENT
Start: 2022-09-25 | End: 2022-09-28

## 2022-09-25 RX ORDER — ENOXAPARIN SODIUM 100 MG/ML
40 INJECTION SUBCUTANEOUS DAILY
Status: DISCONTINUED | OUTPATIENT
Start: 2022-09-25 | End: 2022-09-25

## 2022-09-25 RX ORDER — ESCITALOPRAM OXALATE 10 MG/1
10 TABLET ORAL NIGHTLY
Refills: 1 | Status: DISCONTINUED | OUTPATIENT
Start: 2022-09-25 | End: 2022-09-28

## 2022-09-25 RX ORDER — FLUCONAZOLE 100 MG/1
100 TABLET ORAL DAILY
Status: DISCONTINUED | OUTPATIENT
Start: 2022-09-25 | End: 2022-09-25

## 2022-09-25 RX ORDER — CYCLOSPORINE 100 MG/1
200 CAPSULE, GELATIN COATED ORAL 2 TIMES DAILY
Status: DISCONTINUED | OUTPATIENT
Start: 2022-09-25 | End: 2022-09-27

## 2022-09-25 RX ORDER — BISACODYL 10 MG
10 SUPPOSITORY, RECTAL RECTAL
Status: DISCONTINUED | OUTPATIENT
Start: 2022-09-25 | End: 2022-09-28

## 2022-09-25 RX ORDER — FOLIC ACID 1 MG/1
1 TABLET ORAL NIGHTLY
Status: DISCONTINUED | OUTPATIENT
Start: 2022-09-25 | End: 2022-09-28

## 2022-09-25 RX ORDER — ATORVASTATIN CALCIUM 10 MG/1
10 TABLET, FILM COATED ORAL NIGHTLY
Status: DISCONTINUED | OUTPATIENT
Start: 2022-09-25 | End: 2022-09-28

## 2022-09-25 RX ORDER — SODIUM CHLORIDE 9 MG/ML
INJECTION, SOLUTION INTRAVENOUS CONTINUOUS
Status: DISCONTINUED | OUTPATIENT
Start: 2022-09-25 | End: 2022-09-28

## 2022-09-25 RX ORDER — CLOPIDOGREL BISULFATE 75 MG/1
75 TABLET ORAL NIGHTLY
Status: DISCONTINUED | OUTPATIENT
Start: 2022-09-25 | End: 2022-09-28

## 2022-09-25 RX ORDER — ONDANSETRON 2 MG/ML
4 INJECTION INTRAMUSCULAR; INTRAVENOUS EVERY 6 HOURS PRN
Status: DISCONTINUED | OUTPATIENT
Start: 2022-09-25 | End: 2022-09-28

## 2022-09-25 RX ORDER — NICOTINE POLACRILEX 4 MG
15 LOZENGE BUCCAL
Status: DISCONTINUED | OUTPATIENT
Start: 2022-09-25 | End: 2022-09-28

## 2022-09-25 RX ORDER — MELATONIN
3 NIGHTLY PRN
Status: DISCONTINUED | OUTPATIENT
Start: 2022-09-25 | End: 2022-09-28

## 2022-09-25 RX ORDER — LOSARTAN POTASSIUM 25 MG/1
25 TABLET ORAL DAILY
Status: DISCONTINUED | OUTPATIENT
Start: 2022-09-25 | End: 2022-09-28

## 2022-09-25 RX ORDER — DEXTROSE MONOHYDRATE 25 G/50ML
50 INJECTION, SOLUTION INTRAVENOUS
Status: DISCONTINUED | OUTPATIENT
Start: 2022-09-25 | End: 2022-09-28

## 2022-09-25 RX ORDER — PREDNISONE 1 MG/1
5 TABLET ORAL
Status: DISCONTINUED | OUTPATIENT
Start: 2022-09-25 | End: 2022-09-28

## 2022-09-25 RX ORDER — FLUCONAZOLE 100 MG/1
100 TABLET ORAL NIGHTLY
COMMUNITY
Start: 2022-09-22 | End: 2022-10-02

## 2022-09-25 RX ORDER — FLUCONAZOLE 100 MG/1
100 TABLET ORAL NIGHTLY
Status: DISCONTINUED | OUTPATIENT
Start: 2022-09-25 | End: 2022-09-28

## 2022-09-25 RX ORDER — ACETAMINOPHEN 500 MG
500 TABLET ORAL EVERY 4 HOURS PRN
Status: DISCONTINUED | OUTPATIENT
Start: 2022-09-25 | End: 2022-09-28

## 2022-09-25 RX ORDER — PANTOPRAZOLE SODIUM 20 MG/1
20 TABLET, DELAYED RELEASE ORAL
Status: DISCONTINUED | OUTPATIENT
Start: 2022-09-26 | End: 2022-09-28

## 2022-09-25 RX ORDER — POLYETHYLENE GLYCOL 3350 17 G/17G
17 POWDER, FOR SOLUTION ORAL DAILY PRN
Status: DISCONTINUED | OUTPATIENT
Start: 2022-09-25 | End: 2022-09-28

## 2022-09-25 RX ORDER — SODIUM PHOSPHATE, DIBASIC AND SODIUM PHOSPHATE, MONOBASIC 7; 19 G/133ML; G/133ML
1 ENEMA RECTAL ONCE AS NEEDED
Status: DISCONTINUED | OUTPATIENT
Start: 2022-09-25 | End: 2022-09-28

## 2022-09-25 RX ORDER — NICOTINE POLACRILEX 4 MG
30 LOZENGE BUCCAL
Status: DISCONTINUED | OUTPATIENT
Start: 2022-09-25 | End: 2022-09-28

## 2022-09-25 NOTE — PROGRESS NOTES
Pt received as direct admission from home. Admitted with weakness, fatigue, poor appetite. A&OX4. On room air saturating 90's. Reports shortness of breath with exertion. Sinus rhythm on the monitor. IV fluids initiated per order. Oriented to room and call light. Spouse at bedside. Updated on plan of care.

## 2022-09-26 LAB
ALBUMIN SERPL-MCNC: 1.1 G/DL (ref 3.4–5)
ALBUMIN/GLOB SERPL: 0.3 {RATIO} (ref 1–2)
ALP LIVER SERPL-CCNC: 80 U/L
ALT SERPL-CCNC: 16 U/L
ANION GAP SERPL CALC-SCNC: 8 MMOL/L (ref 0–18)
AST SERPL-CCNC: 28 U/L (ref 15–37)
BILIRUB SERPL-MCNC: 0.5 MG/DL (ref 0.1–2)
BUN BLD-MCNC: 70 MG/DL (ref 7–18)
CALCIUM BLD-MCNC: 8.4 MG/DL (ref 8.5–10.1)
CHLORIDE SERPL-SCNC: 97 MMOL/L (ref 98–112)
CO2 SERPL-SCNC: 27 MMOL/L (ref 21–32)
CREAT BLD-MCNC: 1.43 MG/DL
GFR SERPLBLD BASED ON 1.73 SQ M-ARVRAT: 50 ML/MIN/1.73M2 (ref 60–?)
GLOBULIN PLAS-MCNC: 4 G/DL (ref 2.8–4.4)
GLUCOSE BLD-MCNC: 191 MG/DL (ref 70–99)
GLUCOSE BLD-MCNC: 201 MG/DL (ref 70–99)
GLUCOSE BLD-MCNC: 276 MG/DL (ref 70–99)
GLUCOSE BLD-MCNC: 78 MG/DL (ref 70–99)
GLUCOSE BLD-MCNC: 79 MG/DL (ref 70–99)
OSMOLALITY SERPL CALC.SUM OF ELEC: 293 MOSM/KG (ref 275–295)
POTASSIUM SERPL-SCNC: 3.4 MMOL/L (ref 3.5–5.1)
PROT SERPL-MCNC: 5.1 G/DL (ref 6.4–8.2)
SODIUM SERPL-SCNC: 132 MMOL/L (ref 136–145)

## 2022-09-26 PROCEDURE — 99233 SBSQ HOSP IP/OBS HIGH 50: CPT | Performed by: INTERNAL MEDICINE

## 2022-09-26 PROCEDURE — 90792 PSYCH DIAG EVAL W/MED SRVCS: CPT | Performed by: OTHER

## 2022-09-26 PROCEDURE — 99225 SUBSEQUENT OBSERVATION CARE: CPT | Performed by: INTERNAL MEDICINE

## 2022-09-26 RX ORDER — MIRTAZAPINE 15 MG/1
15 TABLET, FILM COATED ORAL NIGHTLY
Status: DISCONTINUED | OUTPATIENT
Start: 2022-09-26 | End: 2022-09-28

## 2022-09-26 NOTE — PHYSICAL THERAPY NOTE
Order received, chart reviewed. Attempted to see pt early in AM with request to return after breakfast. Following breakfast, RN reported pt with episode of emesis. Will hold and follow as appropriate.      Garry Mann, PT  09/26/22

## 2022-09-26 NOTE — OCCUPATIONAL THERAPY NOTE
OT order received and chart reviewed. Attempted to see Pt early in the AM, RN reports Pt was not feeling well. Therapist checked in with Pt who reports they did not feel well enough for evaluation. Pt educated on therapy role in the hospital and demonstrated verbal understanding. OT will continue to follow and reattempt eval as Pt is appropriate and schedule allows.

## 2022-09-26 NOTE — BH PROGRESS NOTE
Dr. Maldonado Nilam recommended this pt outpatient psychotherapy upon dc. SW provided therapy referrals/resources under discharge instructions. Pt/family to contact the therapist directly in order to make an appointment for the service.

## 2022-09-27 LAB
ANION GAP SERPL CALC-SCNC: 6 MMOL/L (ref 0–18)
BASOPHILS # BLD AUTO: 0 X10(3) UL (ref 0–0.2)
BASOPHILS NFR BLD AUTO: 0 %
BUN BLD-MCNC: 67 MG/DL (ref 7–18)
CALCIUM BLD-MCNC: 8 MG/DL (ref 8.5–10.1)
CHLORIDE SERPL-SCNC: 99 MMOL/L (ref 98–112)
CO2 SERPL-SCNC: 26 MMOL/L (ref 21–32)
CREAT BLD-MCNC: 1.46 MG/DL
EOSINOPHIL # BLD AUTO: 0.02 X10(3) UL (ref 0–0.7)
EOSINOPHIL NFR BLD AUTO: 0.2 %
ERYTHROCYTE [DISTWIDTH] IN BLOOD BY AUTOMATED COUNT: 13.9 %
GFR SERPLBLD BASED ON 1.73 SQ M-ARVRAT: 49 ML/MIN/1.73M2 (ref 60–?)
GLUCOSE BLD-MCNC: 146 MG/DL (ref 70–99)
GLUCOSE BLD-MCNC: 152 MG/DL (ref 70–99)
GLUCOSE BLD-MCNC: 215 MG/DL (ref 70–99)
GLUCOSE BLD-MCNC: 268 MG/DL (ref 70–99)
GLUCOSE BLD-MCNC: 57 MG/DL (ref 70–99)
GLUCOSE BLD-MCNC: 59 MG/DL (ref 70–99)
GLUCOSE BLD-MCNC: 72 MG/DL (ref 70–99)
GLUCOSE BLD-MCNC: 74 MG/DL (ref 70–99)
GLUCOSE BLD-MCNC: 89 MG/DL (ref 70–99)
HCT VFR BLD AUTO: 27.1 %
HGB BLD-MCNC: 8.5 G/DL
IMM GRANULOCYTES # BLD AUTO: 0.03 X10(3) UL (ref 0–1)
IMM GRANULOCYTES NFR BLD: 0.3 %
LYMPHOCYTES # BLD AUTO: 4.99 X10(3) UL (ref 1–4)
LYMPHOCYTES NFR BLD AUTO: 47.1 %
MCH RBC QN AUTO: 28.2 PG (ref 26–34)
MCHC RBC AUTO-ENTMCNC: 31.4 G/DL (ref 31–37)
MCV RBC AUTO: 90 FL
MONOCYTES # BLD AUTO: 0.45 X10(3) UL (ref 0.1–1)
MONOCYTES NFR BLD AUTO: 4.2 %
NEUTROPHILS # BLD AUTO: 5.1 X10 (3) UL (ref 1.5–7.7)
NEUTROPHILS # BLD AUTO: 5.1 X10(3) UL (ref 1.5–7.7)
NEUTROPHILS NFR BLD AUTO: 48.2 %
OSMOLALITY SERPL CALC.SUM OF ELEC: 289 MOSM/KG (ref 275–295)
PLATELET # BLD AUTO: 457 10(3)UL (ref 150–450)
POTASSIUM SERPL-SCNC: 3.6 MMOL/L (ref 3.5–5.1)
RBC # BLD AUTO: 3.01 X10(6)UL
SODIUM SERPL-SCNC: 131 MMOL/L (ref 136–145)
WBC # BLD AUTO: 10.6 X10(3) UL (ref 4–11)

## 2022-09-27 PROCEDURE — 99225 SUBSEQUENT OBSERVATION CARE: CPT | Performed by: OTHER

## 2022-09-27 PROCEDURE — 99233 SBSQ HOSP IP/OBS HIGH 50: CPT | Performed by: INTERNAL MEDICINE

## 2022-09-27 PROCEDURE — 99225 SUBSEQUENT OBSERVATION CARE: CPT | Performed by: INTERNAL MEDICINE

## 2022-09-27 RX ORDER — INSULIN DEGLUDEC 200 U/ML
15 INJECTION, SOLUTION SUBCUTANEOUS EVERY EVENING
Qty: 30 ML | Refills: 1 | Status: SHIPPED | COMMUNITY
Start: 2022-09-28

## 2022-09-27 RX ORDER — MIRTAZAPINE 15 MG/1
15 TABLET, FILM COATED ORAL NIGHTLY
Qty: 30 TABLET | Refills: 0 | Status: SHIPPED | OUTPATIENT
Start: 2022-09-27

## 2022-09-27 NOTE — HOME CARE LIAISON
Received referral via [facility] for Home Health services. Met with patient and spouse Luda Peralta at bedside to discuss Residential home health and provide choice. Patient provided with list of [facility] providers from [address] [place] [address], choice is [facility]. Agency reserved in [address] [place] [address] and contact information placed on AVS.Financial interest disclosure provided. Residential brochure and contact information provided to patient. All questions and concerns addressed and answered. SW updated.

## 2022-09-28 VITALS
TEMPERATURE: 98 F | HEART RATE: 73 BPM | RESPIRATION RATE: 18 BRPM | DIASTOLIC BLOOD PRESSURE: 76 MMHG | OXYGEN SATURATION: 98 % | WEIGHT: 185.69 LBS | SYSTOLIC BLOOD PRESSURE: 120 MMHG | BODY MASS INDEX: 27 KG/M2

## 2022-09-28 LAB
ANION GAP SERPL CALC-SCNC: 6 MMOL/L (ref 0–18)
BUN BLD-MCNC: 61 MG/DL (ref 7–18)
CALCIUM BLD-MCNC: 8.3 MG/DL (ref 8.5–10.1)
CHLORIDE SERPL-SCNC: 100 MMOL/L (ref 98–112)
CO2 SERPL-SCNC: 26 MMOL/L (ref 21–32)
CREAT BLD-MCNC: 1.58 MG/DL
GFR SERPLBLD BASED ON 1.73 SQ M-ARVRAT: 44 ML/MIN/1.73M2 (ref 60–?)
GLUCOSE BLD-MCNC: 146 MG/DL (ref 70–99)
GLUCOSE BLD-MCNC: 177 MG/DL (ref 70–99)
GLUCOSE BLD-MCNC: 188 MG/DL (ref 70–99)
MAGNESIUM SERPL-MCNC: 1.6 MG/DL (ref 1.6–2.6)
OSMOLALITY SERPL CALC.SUM OF ELEC: 296 MOSM/KG (ref 275–295)
POTASSIUM SERPL-SCNC: 3.9 MMOL/L (ref 3.5–5.1)
SODIUM SERPL-SCNC: 132 MMOL/L (ref 136–145)

## 2022-09-28 PROCEDURE — 99233 SBSQ HOSP IP/OBS HIGH 50: CPT | Performed by: INTERNAL MEDICINE

## 2022-09-28 PROCEDURE — 99214 OFFICE O/P EST MOD 30 MIN: CPT | Performed by: OTHER

## 2022-09-28 PROCEDURE — 99217 OBSERVATION CARE DISCHARGE: CPT | Performed by: HOSPITALIST

## 2022-09-28 RX ORDER — CYCLOSPORINE 100 MG/1
CAPSULE, GELATIN COATED ORAL
Qty: 120 CAPSULE | Refills: 1 | Status: SHIPPED | OUTPATIENT
Start: 2022-09-28

## 2022-09-28 RX ORDER — TORSEMIDE 20 MG/1
20 TABLET ORAL 2 TIMES DAILY
Qty: 120 TABLET | Refills: 1 | Status: SHIPPED | OUTPATIENT
Start: 2022-09-28

## 2022-09-28 RX ORDER — MAGNESIUM SULFATE HEPTAHYDRATE 40 MG/ML
2 INJECTION, SOLUTION INTRAVENOUS ONCE
Status: COMPLETED | OUTPATIENT
Start: 2022-09-28 | End: 2022-09-28

## 2022-09-28 NOTE — CM/SW NOTE
09/27/22 0900   CM/SW Referral Data   Referral Source Social Work (self-referral)   Reason for Referral Discharge planning   Informant Patient;Spouse/Significant Other   Pertinent Medical Hx   Does patient have an established PCP? Yes   Patient Info   Patient's Current Mental Status at Time of Assessment Alert;Oriented   Patient's [address]   Patient lives with Spouse/Significant other   Patient Status Prior to Admission   Independent with ADLs and Mobility Yes   Discharge Needs   Anticipated D/C needs Home health care     SW met with pt and spouse at bedside to complete discharge assessment. The pt is a 64 y/o male who was admitted for shortness of breath. The pt is fully alert and oriented. Pt confirms that he resides in [place] with his spouse and dog. Pt reports that he is usually independent at home. Pt has no history of HH but is agreeable to having HH at discharge. Pt confirms returning to [place] at discharge. Referrals for [facility]. Order/F2F entered. Await options to provide choice list.      &  to remain available and supportive for discharge planning needs.     PIA Saez, [facility]  Discharge [address]
Spoke with liaison, [facility]      [facility]  P:[phone]  1 \[facility] [place] [facility]\""PIA, [facility]  Discharge [address]
07-Aug-2019 22:51

## 2022-09-28 NOTE — PROGRESS NOTES
Patient discharged at 1630. IV removed. Tele monitoring discontinued. After visit summary discussed with patient and all questions were answered, follow up appointments confirmed. Patient given paper scripts for remeron and eliquis. Patient has been accepted for [address] Wheeled down to [facility] with transporter. Spouse and daughter present to  patient.

## 2022-09-29 ENCOUNTER — TELEPHONE (OUTPATIENT)
Dept: HEMATOLOGY/ONCOLOGY | Age: 77
End: 2022-09-29

## 2022-09-29 DIAGNOSIS — C34.81 MALIGNANT NEOPLASM OF OVERLAPPING SITES OF RIGHT LUNG (HCC): Primary | ICD-10-CM

## 2022-09-29 NOTE — TELEPHONE ENCOUNTER
Patients wife is calling because he is supposed to have a CT scan but the soonest appointment they had was 10-6-22 and his appointment with Dr. Brittanie Padron is 10-4-22. So they are wondering should they reschedule appointment on 10-4-22.

## 2022-10-02 ENCOUNTER — MOBILE ENCOUNTER (OUTPATIENT)
Dept: NEPHROLOGY | Facility: CLINIC | Age: 77
End: 2022-10-02

## 2022-10-03 ENCOUNTER — HOSPITAL ENCOUNTER (OUTPATIENT)
Dept: CT IMAGING | Facility: HOSPITAL | Age: 77
Discharge: HOME | End: 2022-10-03
Attending: INTERNAL MEDICINE
Payer: MEDICARE

## 2022-10-03 ENCOUNTER — TELEPHONE (OUTPATIENT)
Dept: NEPHROLOGY | Facility: CLINIC | Age: 77
End: 2022-10-03

## 2022-10-03 ENCOUNTER — TELEPHONE (OUTPATIENT)
Dept: HEMATOLOGY/ONCOLOGY | Facility: HOSPITAL | Age: 77
End: 2022-10-03

## 2022-10-03 DIAGNOSIS — C34.81 MALIGNANT NEOPLASM OF OVERLAPPING SITES OF RIGHT LUNG (HCC): ICD-10-CM

## 2022-10-03 PROCEDURE — 74176 CT ABD & PELVIS W/O CONTRAST: CPT | Performed by: INTERNAL MEDICINE

## 2022-10-03 PROCEDURE — 71250 CT THORAX DX C-: CPT | Performed by: INTERNAL MEDICINE

## 2022-10-03 NOTE — TELEPHONE ENCOUNTER
Pharmacy received script for cyclosporine. Pharmacist stated that patient had prednisone filled at another pharmacy. He said there is a drug interaction between these 2 drugs. Is it ok for patient to take?     Contact pharmacy at [phone]  Option #2  Ref#:  [id number]

## 2022-10-04 ENCOUNTER — OFFICE VISIT (OUTPATIENT)
Dept: NEPHROLOGY | Facility: CLINIC | Age: 77
End: 2022-10-04
Payer: MEDICARE

## 2022-10-04 ENCOUNTER — APPOINTMENT (OUTPATIENT)
Dept: HEMATOLOGY/ONCOLOGY | Age: 77
End: 2022-10-04
Attending: INTERNAL MEDICINE
Payer: MEDICARE

## 2022-10-04 ENCOUNTER — OFFICE VISIT (OUTPATIENT)
Dept: HEMATOLOGY/ONCOLOGY | Age: 77
End: 2022-10-04
Attending: INTERNAL MEDICINE
Payer: MEDICARE

## 2022-10-04 VITALS
BODY MASS INDEX: 27.15 KG/M2 | SYSTOLIC BLOOD PRESSURE: 118 MMHG | TEMPERATURE: 97 F | HEIGHT: 69.02 IN | DIASTOLIC BLOOD PRESSURE: 65 MMHG | HEART RATE: 96 BPM | OXYGEN SATURATION: 99 % | WEIGHT: 183.31 LBS | RESPIRATION RATE: 18 BRPM

## 2022-10-04 VITALS — DIASTOLIC BLOOD PRESSURE: 58 MMHG | SYSTOLIC BLOOD PRESSURE: 100 MMHG | WEIGHT: 185 LBS | BODY MASS INDEX: 27 KG/M2

## 2022-10-04 DIAGNOSIS — N04.9 NEPHROTIC SYNDROME: Primary | ICD-10-CM

## 2022-10-04 DIAGNOSIS — G89.3 NEOPLASM RELATED PAIN: Primary | ICD-10-CM

## 2022-10-04 DIAGNOSIS — N04.9 NEPHROTIC SYNDROME: ICD-10-CM

## 2022-10-04 DIAGNOSIS — C34.81 MALIGNANT NEOPLASM OF OVERLAPPING SITES OF RIGHT LUNG (HCC): ICD-10-CM

## 2022-10-04 DIAGNOSIS — R53.83 FATIGUE DUE TO TREATMENT: ICD-10-CM

## 2022-10-04 DIAGNOSIS — C78.7 LIVER METASTASES (HCC): ICD-10-CM

## 2022-10-04 DIAGNOSIS — R59.9 ADENOPATHY: ICD-10-CM

## 2022-10-04 DIAGNOSIS — D72.820 MONOCLONAL B-CELL LYMPHOCYTOSIS: ICD-10-CM

## 2022-10-04 DIAGNOSIS — E87.5 HYPERKALEMIA: ICD-10-CM

## 2022-10-04 DIAGNOSIS — R63.0 LACK OF APPETITE: ICD-10-CM

## 2022-10-04 DIAGNOSIS — F32.9 REACTIVE DEPRESSION: ICD-10-CM

## 2022-10-04 LAB
ALBUMIN SERPL-MCNC: 1.4 G/DL (ref 3.4–5)
ALBUMIN/GLOB SERPL: 0.3 {RATIO} (ref 1–2)
ALP LIVER SERPL-CCNC: 116 U/L
ALT SERPL-CCNC: 21 U/L
ANION GAP SERPL CALC-SCNC: 6 MMOL/L (ref 0–18)
AST SERPL-CCNC: 31 U/L (ref 15–37)
BASOPHILS # BLD AUTO: 0.02 X10(3) UL (ref 0–0.2)
BASOPHILS NFR BLD AUTO: 0.2 %
BILIRUB SERPL-MCNC: 0.6 MG/DL (ref 0.1–2)
BILIRUB UR QL STRIP.AUTO: NEGATIVE
BUN BLD-MCNC: 82 MG/DL (ref 7–18)
CALCIUM BLD-MCNC: 9 MG/DL (ref 8.5–10.1)
CEA SERPL-MCNC: 8.6 NG/ML (ref ?–5)
CHLORIDE SERPL-SCNC: 94 MMOL/L (ref 98–112)
CLARITY UR REFRACT.AUTO: CLEAR
CO2 SERPL-SCNC: 28 MMOL/L (ref 21–32)
COLOR UR AUTO: YELLOW
CREAT BLD-MCNC: 1.68 MG/DL
CREAT UR-SCNC: 66.1 MG/DL
EOSINOPHIL # BLD AUTO: 0 X10(3) UL (ref 0–0.7)
EOSINOPHIL NFR BLD AUTO: 0 %
ERYTHROCYTE [DISTWIDTH] IN BLOOD BY AUTOMATED COUNT: 14.2 %
FASTING STATUS PATIENT QL REPORTED: NO
GFR SERPLBLD BASED ON 1.73 SQ M-ARVRAT: 41 ML/MIN/1.73M2 (ref 60–?)
GLOBULIN PLAS-MCNC: 4.9 G/DL (ref 2.8–4.4)
GLUCOSE BLD-MCNC: 173 MG/DL (ref 70–99)
GLUCOSE UR STRIP.AUTO-MCNC: NEGATIVE MG/DL
HCT VFR BLD AUTO: 29.1 %
HGB BLD-MCNC: 9.4 G/DL
HYALINE CASTS #/AREA URNS AUTO: PRESENT /LPF
IMM GRANULOCYTES # BLD AUTO: 0.04 X10(3) UL (ref 0–1)
IMM GRANULOCYTES NFR BLD: 0.4 %
KETONES UR STRIP.AUTO-MCNC: NEGATIVE MG/DL
LDH SERPL L TO P-CCNC: 203 U/L
LEUKOCYTE ESTERASE UR QL STRIP.AUTO: NEGATIVE
LYMPHOCYTES # BLD AUTO: 4.55 X10(3) UL (ref 1–4)
LYMPHOCYTES NFR BLD AUTO: 39.9 %
MAGNESIUM SERPL-MCNC: 1.4 MG/DL (ref 1.6–2.6)
MCH RBC QN AUTO: 28.6 PG (ref 26–34)
MCHC RBC AUTO-ENTMCNC: 32.3 G/DL (ref 31–37)
MCV RBC AUTO: 88.4 FL
MONOCYTES # BLD AUTO: 0.34 X10(3) UL (ref 0.1–1)
MONOCYTES NFR BLD AUTO: 3 %
NEUTROPHILS # BLD AUTO: 6.46 X10 (3) UL (ref 1.5–7.7)
NEUTROPHILS # BLD AUTO: 6.46 X10(3) UL (ref 1.5–7.7)
NEUTROPHILS NFR BLD AUTO: 56.5 %
NITRITE UR QL STRIP.AUTO: NEGATIVE
OSMOLALITY SERPL CALC.SUM OF ELEC: 295 MOSM/KG (ref 275–295)
PH UR STRIP.AUTO: 5 [PH] (ref 5–8)
PHOSPHATE SERPL-MCNC: 4.4 MG/DL (ref 2.5–4.9)
PLATELET # BLD AUTO: 517 10(3)UL (ref 150–450)
POTASSIUM SERPL-SCNC: 5.4 MMOL/L (ref 3.5–5.1)
PROT SERPL-MCNC: 6.3 G/DL (ref 6.4–8.2)
PROT UR-MCNC: 137 MG/DL
RBC # BLD AUTO: 3.29 X10(6)UL
SODIUM SERPL-SCNC: 128 MMOL/L (ref 136–145)
SP GR UR STRIP.AUTO: 1.01 (ref 1–1.03)
UROBILINOGEN UR STRIP.AUTO-MCNC: 1 MG/DL
WBC # BLD AUTO: 11.4 X10(3) UL (ref 4–11)

## 2022-10-04 PROCEDURE — 1125F AMNT PAIN NOTED PAIN PRSNT: CPT | Performed by: INTERNAL MEDICINE

## 2022-10-04 PROCEDURE — 99215 OFFICE O/P EST HI 40 MIN: CPT | Performed by: INTERNAL MEDICINE

## 2022-10-04 RX ORDER — TORSEMIDE 20 MG/1
20 TABLET ORAL 2 TIMES DAILY
COMMUNITY

## 2022-10-04 RX ORDER — HYDROCODONE BITARTRATE AND ACETAMINOPHEN 5; 325 MG/1; MG/1
1-2 TABLET ORAL EVERY 8 HOURS PRN
COMMUNITY
End: 2022-10-04

## 2022-10-04 RX ORDER — OXYCODONE HYDROCHLORIDE 5 MG/1
5 CAPSULE ORAL EVERY 4 HOURS PRN
COMMUNITY
End: 2022-10-04

## 2022-10-04 RX ORDER — HYDROCODONE BITARTRATE AND ACETAMINOPHEN 5; 325 MG/1; MG/1
1 TABLET ORAL EVERY 6 HOURS PRN
Qty: 60 TABLET | Refills: 0 | Status: SHIPPED | OUTPATIENT
Start: 2022-10-04

## 2022-10-04 NOTE — PROGRESS NOTES
Pt here for 3 week MD f/u. Energy level and appetite have been \"zero\", not eating much at all. Pain is 9/10 when up and moving, taking Norco 1-2 x's/day, also took Oxycodone 5mg once or twice. Pt has bad weeping edema in BUE and BLE. Pt using Miralax for constipation.  Pt has SOB with exertion and at rest.     Outpatient Oncology Care Plan  Problem list:  fatigue  knowledge deficit    Problems related to:    disease/disease progression    Interventions:  provided general teaching    Expected outcomes:  safe in environment  symptoms relieved/minimized  understands plan of care    Progress towards outcome:  making progress    Education Record    Learner:  Patient, Spouse and Family Member  Barriers / Limitations:  None  Method:  Reinforcement  Outcome:  Shows understanding  Comments:

## 2022-10-05 DIAGNOSIS — C34.81 MALIGNANT NEOPLASM OF OVERLAPPING SITES OF RIGHT LUNG (HCC): Primary | ICD-10-CM

## 2022-10-05 RX ORDER — ONDANSETRON HYDROCHLORIDE 8 MG/1
8 TABLET, FILM COATED ORAL EVERY 8 HOURS PRN
Qty: 30 TABLET | Refills: 3 | Status: SHIPPED | OUTPATIENT
Start: 2022-10-05

## 2022-10-05 RX ORDER — PROCHLORPERAZINE MALEATE 10 MG
10 TABLET ORAL EVERY 6 HOURS PRN
Qty: 30 TABLET | Refills: 3 | Status: SHIPPED | OUTPATIENT
Start: 2022-10-05

## 2022-10-05 NOTE — TELEPHONE ENCOUNTER
Patient would like for Lucrecia Romberg to know that he is ready to start chemo.  Thanks [employer]

## 2022-10-06 ENCOUNTER — LAB REQUISITION (OUTPATIENT)
Dept: LAB | Age: 77
End: 2022-10-06
Payer: MEDICARE

## 2022-10-06 ENCOUNTER — TELEPHONE (OUTPATIENT)
Dept: NEPHROLOGY | Facility: CLINIC | Age: 77
End: 2022-10-06

## 2022-10-06 DIAGNOSIS — R13.10 DYSPHAGIA, UNSPECIFIED: ICD-10-CM

## 2022-10-06 DIAGNOSIS — B37.9 CANDIDIASIS, UNSPECIFIED: ICD-10-CM

## 2022-10-06 LAB
ANION GAP SERPL CALC-SCNC: 9 MMOL/L (ref 0–18)
BUN BLD-MCNC: 86 MG/DL (ref 7–18)
CALCIUM BLD-MCNC: 9 MG/DL (ref 8.5–10.1)
CHLORIDE SERPL-SCNC: 97 MMOL/L (ref 98–112)
CO2 SERPL-SCNC: 26 MMOL/L (ref 21–32)
CREAT BLD-MCNC: 1.58 MG/DL
CYCLOSPORINE A: 916.3 NG/ML
FASTING STATUS PATIENT QL REPORTED: NO
GFR SERPLBLD BASED ON 1.73 SQ M-ARVRAT: 44 ML/MIN/1.73M2 (ref 60–?)
GLUCOSE BLD-MCNC: 102 MG/DL (ref 70–99)
MAGNESIUM SERPL-MCNC: 1.6 MG/DL (ref 1.6–2.6)
OSMOLALITY SERPL CALC.SUM OF ELEC: 300 MOSM/KG (ref 275–295)
POTASSIUM SERPL-SCNC: 5.6 MMOL/L (ref 3.5–5.1)
SODIUM SERPL-SCNC: 132 MMOL/L (ref 136–145)

## 2022-10-06 PROCEDURE — 80048 BASIC METABOLIC PNL TOTAL CA: CPT | Performed by: INTERNAL MEDICINE

## 2022-10-06 PROCEDURE — 83735 ASSAY OF MAGNESIUM: CPT | Performed by: INTERNAL MEDICINE

## 2022-10-06 NOTE — TELEPHONE ENCOUNTER
Patient stated he is very confused about which medications he is supposed to be taking. He asked if he is to be taking the Mycophenolate and the Bactrim. He would like you to write specific directions and which medications he is to be taking. Would you send to him in his My Chart?

## 2022-10-07 ENCOUNTER — TELEPHONE (OUTPATIENT)
Dept: NEPHROLOGY | Facility: CLINIC | Age: 77
End: 2022-10-07

## 2022-10-07 DIAGNOSIS — E87.5 HYPERKALEMIA: Primary | ICD-10-CM

## 2022-10-07 NOTE — TELEPHONE ENCOUNTER
Patient's wife stated that Dr. Heather Brooke forwarded lab results to you. Wife said potassium level was elevated. She wanted you to take look.

## 2022-10-10 ENCOUNTER — DOCUMENTATION ONLY (OUTPATIENT)
Dept: HEMATOLOGY/ONCOLOGY | Facility: HOSPITAL | Age: 77
End: 2022-10-10

## 2022-10-10 ENCOUNTER — APPOINTMENT (OUTPATIENT)
Dept: HEMATOLOGY/ONCOLOGY | Age: 77
End: 2022-10-10
Attending: INTERNAL MEDICINE
Payer: MEDICARE

## 2022-10-10 NOTE — PROGRESS NOTES
ONCOLOGY NUTRITION RESCREEN complete as triggered by [facility] dx of metastatic NSCLC. Chart reviewed. RD available on consult.

## 2022-10-11 ENCOUNTER — OFFICE VISIT (OUTPATIENT)
Dept: HEMATOLOGY/ONCOLOGY | Age: 77
End: 2022-10-11
Attending: INTERNAL MEDICINE
Payer: MEDICARE

## 2022-10-11 ENCOUNTER — SOCIAL WORK SERVICES (OUTPATIENT)
Dept: HEMATOLOGY/ONCOLOGY | Facility: HOSPITAL | Age: 77
End: 2022-10-11

## 2022-10-11 VITALS
OXYGEN SATURATION: 99 % | HEIGHT: 69.02 IN | DIASTOLIC BLOOD PRESSURE: 66 MMHG | WEIGHT: 173.31 LBS | HEART RATE: 92 BPM | SYSTOLIC BLOOD PRESSURE: 116 MMHG | TEMPERATURE: 97 F | BODY MASS INDEX: 25.67 KG/M2

## 2022-10-11 DIAGNOSIS — N04.9 NEPHROTIC SYNDROME: ICD-10-CM

## 2022-10-11 DIAGNOSIS — Z71.89 GOALS OF CARE, COUNSELING/DISCUSSION: ICD-10-CM

## 2022-10-11 DIAGNOSIS — R53.83 FATIGUE DUE TO TREATMENT: ICD-10-CM

## 2022-10-11 DIAGNOSIS — R80.9 NEPHROTIC RANGE PROTEINURIA: ICD-10-CM

## 2022-10-11 DIAGNOSIS — R63.0 LACK OF APPETITE: ICD-10-CM

## 2022-10-11 DIAGNOSIS — C34.81 MALIGNANT NEOPLASM OF OVERLAPPING SITES OF RIGHT LUNG (HCC): ICD-10-CM

## 2022-10-11 DIAGNOSIS — Z51.11 ENCOUNTER FOR CHEMOTHERAPY MANAGEMENT: ICD-10-CM

## 2022-10-11 DIAGNOSIS — G89.3 NEOPLASM RELATED PAIN: Primary | ICD-10-CM

## 2022-10-11 DIAGNOSIS — C78.7 LIVER METASTASES (HCC): ICD-10-CM

## 2022-10-11 LAB
ALBUMIN SERPL-MCNC: 1.5 G/DL (ref 3.4–5)
ALBUMIN/GLOB SERPL: 0.3 {RATIO} (ref 1–2)
ALP LIVER SERPL-CCNC: 107 U/L
ALT SERPL-CCNC: 16 U/L
ANION GAP SERPL CALC-SCNC: 5 MMOL/L (ref 0–18)
AST SERPL-CCNC: 20 U/L (ref 15–37)
BASOPHILS # BLD AUTO: 0.02 X10(3) UL (ref 0–0.2)
BASOPHILS NFR BLD AUTO: 0.2 %
BILIRUB SERPL-MCNC: 0.4 MG/DL (ref 0.1–2)
BUN BLD-MCNC: 78 MG/DL (ref 7–18)
CALCIUM BLD-MCNC: 9 MG/DL (ref 8.5–10.1)
CHLORIDE SERPL-SCNC: 96 MMOL/L (ref 98–112)
CO2 SERPL-SCNC: 29 MMOL/L (ref 21–32)
CONTROL RUN WITHIN 24 HOURS?: YES
CREAT BLD-MCNC: 1.62 MG/DL
EOSINOPHIL # BLD AUTO: 0.01 X10(3) UL (ref 0–0.7)
EOSINOPHIL NFR BLD AUTO: 0.1 %
ERYTHROCYTE [DISTWIDTH] IN BLOOD BY AUTOMATED COUNT: 14.3 %
FASTING STATUS PATIENT QL REPORTED: NO
GFR SERPLBLD BASED ON 1.73 SQ M-ARVRAT: 43 ML/MIN/1.73M2 (ref 60–?)
GLOBULIN PLAS-MCNC: 4.7 G/DL (ref 2.8–4.4)
GLUCOSE BLD-MCNC: 142 MG/DL (ref 70–99)
GLUCOSE URINE: NEGATIVE
HCT VFR BLD AUTO: 29.2 %
HGB BLD-MCNC: 9.1 G/DL
IMM GRANULOCYTES # BLD AUTO: 0.06 X10(3) UL (ref 0–1)
IMM GRANULOCYTES NFR BLD: 0.5 %
LDH SERPL L TO P-CCNC: 180 U/L
LEUKOCYTE ESTERASE URINE: NEGATIVE
LYMPHOCYTES # BLD AUTO: 4.36 X10(3) UL (ref 1–4)
LYMPHOCYTES NFR BLD AUTO: 34.5 %
MCH RBC QN AUTO: 27.5 PG (ref 26–34)
MCHC RBC AUTO-ENTMCNC: 31.2 G/DL (ref 31–37)
MCV RBC AUTO: 88.2 FL
MONOCYTES # BLD AUTO: 0.44 X10(3) UL (ref 0.1–1)
MONOCYTES NFR BLD AUTO: 3.5 %
NEUTROPHILS # BLD AUTO: 7.74 X10 (3) UL (ref 1.5–7.7)
NEUTROPHILS # BLD AUTO: 7.74 X10(3) UL (ref 1.5–7.7)
NEUTROPHILS NFR BLD AUTO: 61.2 %
NITRITE URINE: NEGATIVE
OSMOLALITY SERPL CALC.SUM OF ELEC: 296 MOSM/KG (ref 275–295)
PLATELET # BLD AUTO: 523 10(3)UL (ref 150–450)
POTASSIUM SERPL-SCNC: 5.3 MMOL/L (ref 3.5–5.1)
PROT SERPL-MCNC: 6.2 G/DL (ref 6.4–8.2)
PROTEIN URINE: 300
RBC # BLD AUTO: 3.31 X10(6)UL
SODIUM SERPL-SCNC: 130 MMOL/L (ref 136–145)
WBC # BLD AUTO: 12.6 X10(3) UL (ref 4–11)

## 2022-10-11 PROCEDURE — 99215 OFFICE O/P EST HI 40 MIN: CPT | Performed by: CLINICAL NURSE SPECIALIST

## 2022-10-11 RX ORDER — MIRTAZAPINE 15 MG/1
15 TABLET, FILM COATED ORAL NIGHTLY
Qty: 30 TABLET | Refills: 2 | Status: SHIPPED | OUTPATIENT
Start: 2022-10-11

## 2022-10-11 NOTE — PROGRESS NOTES
Pt presented today for labs, APN appt, and chemotherapy. Per Laurent Zeng, APN pt has chosen not to receive treatment at this time. Pt not seen in treatment area.

## 2022-10-11 NOTE — PROGRESS NOTES
SW met with pt and his wife, Juhi Lima. Pt states that he would like more information on Hospice. He states that he is current with [facility] and would like to pursue Hospice. Pt requested a referral to [facility] and states that he may review additional agencies. SW contact information provided and bringing zack given. [facility], liaison Theresalaura Retana (ph: [phone], fax: [phone]) notified after fax sent for a referral for Hospice. Eliu Morse LCSW   at [address], [facility], [address] Yeyo Berumen  Ph: [phone]. [email or web address]  Fax: [phone]

## 2022-10-12 ENCOUNTER — TELEPHONE (OUTPATIENT)
Dept: NEPHROLOGY | Facility: CLINIC | Age: 77
End: 2022-10-12

## 2022-10-12 DIAGNOSIS — N04.9 NEPHROTIC SYNDROME: Primary | ICD-10-CM

## 2022-10-12 NOTE — TELEPHONE ENCOUNTER
Patient's wife would like to speak with you regarding the results of the labs that were done yesterday.

## 2022-10-12 NOTE — TELEPHONE ENCOUNTER
Pt called; labs reviewed  Bmp weekly  Await cyclo level (currently med is on hold due to high level)    Patient maybe going into hospice per the wife- they are having ongoing discussions.

## 2022-10-14 NOTE — TELEPHONE ENCOUNTER
Patient called and has a few questions for Dr Heather Brooke regarding hospice. Please call him back. Thank you.

## 2022-10-17 ENCOUNTER — LAB REQUISITION (OUTPATIENT)
Dept: LAB | Facility: HOSPITAL | Age: 77
End: 2022-10-17
Payer: MEDICARE

## 2022-10-17 DIAGNOSIS — I82.4Y1 ACUTE DEEP VEIN THROMBOSIS (DVT) OF PROXIMAL VEIN OF RIGHT LOWER EXTREMITY (HCC): ICD-10-CM

## 2022-10-17 DIAGNOSIS — I12.9 HYPERTENSIVE CHRONIC KIDNEY DISEASE WITH STAGE 1 THROUGH STAGE 4 CHRONIC KIDNEY DISEASE, OR UNSPECIFIED CHRONIC KIDNEY DISEASE: ICD-10-CM

## 2022-10-17 LAB
ANION GAP SERPL CALC-SCNC: 9 MMOL/L (ref 0–18)
BUN BLD-MCNC: 64 MG/DL (ref 7–18)
BUN/CREAT SERPL: 43.5 (ref 10–20)
CALCIUM BLD-MCNC: 8.5 MG/DL (ref 8.5–10.1)
CHLORIDE SERPL-SCNC: 98 MMOL/L (ref 98–112)
CO2 SERPL-SCNC: 24 MMOL/L (ref 21–32)
CREAT BLD-MCNC: 1.47 MG/DL
GFR SERPLBLD BASED ON 1.73 SQ M-ARVRAT: 49 ML/MIN/1.73M2 (ref 60–?)
GLUCOSE BLD-MCNC: 114 MG/DL (ref 70–99)
OSMOLALITY SERPL CALC.SUM OF ELEC: 291 MOSM/KG (ref 275–295)
POTASSIUM SERPL-SCNC: 5.2 MMOL/L (ref 3.5–5.1)
SODIUM SERPL-SCNC: 131 MMOL/L (ref 136–145)

## 2022-10-17 PROCEDURE — 80158 DRUG ASSAY CYCLOSPORINE: CPT | Performed by: INTERNAL MEDICINE

## 2022-10-17 PROCEDURE — 80048 BASIC METABOLIC PNL TOTAL CA: CPT | Performed by: INTERNAL MEDICINE

## 2022-10-17 NOTE — TELEPHONE ENCOUNTER
Patient calling requesting to speak with [facility].    Wanted to confirm he can get the [facility] [employer]

## 2022-10-18 LAB — CYCLOSPORINE A: 75 NG/ML

## 2022-10-19 ENCOUNTER — TELEPHONE (OUTPATIENT)
Dept: NEPHROLOGY | Facility: CLINIC | Age: 77
End: 2022-10-19

## 2022-10-19 DIAGNOSIS — N18.30 STAGE 3 CHRONIC KIDNEY DISEASE, UNSPECIFIED WHETHER STAGE 3A OR 3B CKD (HCC): Primary | ICD-10-CM

## 2022-10-21 ENCOUNTER — TELEPHONE (OUTPATIENT)
Dept: HEMATOLOGY/ONCOLOGY | Facility: HOSPITAL | Age: 77
End: 2022-10-21

## 2022-10-21 RX ORDER — HYDROCODONE BITARTRATE AND ACETAMINOPHEN 5; 325 MG/1; MG/1
1 TABLET ORAL EVERY 4 HOURS PRN
Qty: 60 TABLET | Refills: 0 | Status: SHIPPED | OUTPATIENT
Start: 2022-10-21

## 2022-10-21 NOTE — TELEPHONE ENCOUNTER
Patient has decided not to sign with hospice and continues with home health. His goal remains to make it to his grand daughters wedding in a couple of weeks. He continues with the back pain. Its not worse but persistent. He is using norco every 6 hrs with benefit but the relief doesn't last until the next dose. He is also having 1 episode of nausea with vomiting once daily usually in the evening. He is using zofran without benefit. His pain is usually most intense in the evening as well. He can take norco with food to help minimize any potential nausea SE. Instructed him to increase norco to 1-2 tabs every 4 hrs to provide more consistent and better pain control. Will evaluate how he tolerates this plan  Instructed him to try compazine to see if this is more effective for his evening nausea since zofran isn't providing relief.       He verbalized understanding

## 2022-10-21 NOTE — TELEPHONE ENCOUNTER
Asaf Marquez would like to know like to speak with Ericka Peterson   about pain medication what he can take, and what he can't take.     Thanks [employer]

## 2022-10-24 ENCOUNTER — LAB REQUISITION (OUTPATIENT)
Dept: LAB | Age: 77
End: 2022-10-24
Payer: MEDICARE

## 2022-10-24 ENCOUNTER — TELEPHONE (OUTPATIENT)
Dept: HEMATOLOGY/ONCOLOGY | Facility: HOSPITAL | Age: 77
End: 2022-10-24

## 2022-10-24 NOTE — TELEPHONE ENCOUNTER
Spoke to spouse. They decided not to pursue as he was not ready for this emotionally and was concerned that we would no longer follow him in hospice. I told them this was not the case that we will continue to be involved in his care even with/in hospice. Not eating and weaker. Has had episodes of incontinence. They would like to come out and see us when able and have labs. They will call to schedule.

## 2022-10-25 ENCOUNTER — TELEPHONE (OUTPATIENT)
Dept: NEPHROLOGY | Facility: CLINIC | Age: 77
End: 2022-10-25

## 2022-10-25 DIAGNOSIS — R80.9 NEPHROTIC RANGE PROTEINURIA: Primary | ICD-10-CM

## 2022-10-31 ENCOUNTER — TELEPHONE (OUTPATIENT)
Dept: HEMATOLOGY/ONCOLOGY | Facility: HOSPITAL | Age: 77
End: 2022-10-31

## 2022-10-31 NOTE — TELEPHONE ENCOUNTER
Crescencio Aldridge calling was not able to get labs today but said pt will be at [facility] 136 11/1 and could get them drawn then and make sure lab order is in. Melinda Cooper

## 2022-11-01 ENCOUNTER — LAB ENCOUNTER (OUTPATIENT)
Dept: LAB | Facility: HOSPITAL | Age: 77
End: 2022-11-01
Attending: INTERNAL MEDICINE
Payer: MEDICARE

## 2022-11-01 ENCOUNTER — SOCIAL WORK SERVICES (OUTPATIENT)
Dept: HEMATOLOGY/ONCOLOGY | Facility: HOSPITAL | Age: 77
End: 2022-11-01

## 2022-11-01 DIAGNOSIS — E87.5 HYPERKALEMIA: ICD-10-CM

## 2022-11-01 DIAGNOSIS — C34.81 MALIGNANT NEOPLASM OF OVERLAPPING SITES OF RIGHT LUNG (HCC): ICD-10-CM

## 2022-11-01 DIAGNOSIS — R73.9 HYPERGLYCEMIA: Primary | ICD-10-CM

## 2022-11-01 DIAGNOSIS — R80.9 NEPHROTIC RANGE PROTEINURIA: ICD-10-CM

## 2022-11-01 DIAGNOSIS — N18.30 STAGE 3 CHRONIC KIDNEY DISEASE, UNSPECIFIED WHETHER STAGE 3A OR 3B CKD (HCC): ICD-10-CM

## 2022-11-01 DIAGNOSIS — N04.9 NEPHROTIC SYNDROME: ICD-10-CM

## 2022-11-01 LAB
ALBUMIN SERPL-MCNC: 1.5 G/DL (ref 3.4–5)
ALBUMIN/GLOB SERPL: 0.4 {RATIO} (ref 1–2)
ALP LIVER SERPL-CCNC: 120 U/L
ALT SERPL-CCNC: 18 U/L
ANION GAP SERPL CALC-SCNC: 7 MMOL/L (ref 0–18)
ANION GAP SERPL CALC-SCNC: 7 MMOL/L (ref 0–18)
AST SERPL-CCNC: 26 U/L (ref 15–37)
BILIRUB SERPL-MCNC: 0.4 MG/DL (ref 0.1–2)
BUN BLD-MCNC: 71 MG/DL (ref 7–18)
BUN BLD-MCNC: 71 MG/DL (ref 7–18)
CALCIUM BLD-MCNC: 8.6 MG/DL (ref 8.5–10.1)
CALCIUM BLD-MCNC: 8.6 MG/DL (ref 8.5–10.1)
CEA SERPL-MCNC: 13.4 NG/ML (ref ?–5)
CHLORIDE SERPL-SCNC: 98 MMOL/L (ref 98–112)
CHLORIDE SERPL-SCNC: 98 MMOL/L (ref 98–112)
CO2 SERPL-SCNC: 28 MMOL/L (ref 21–32)
CO2 SERPL-SCNC: 28 MMOL/L (ref 21–32)
CREAT BLD-MCNC: 1.49 MG/DL
CREAT BLD-MCNC: 1.49 MG/DL
CREAT UR-SCNC: 83.5 MG/DL
FASTING STATUS PATIENT QL REPORTED: YES
GFR SERPLBLD BASED ON 1.73 SQ M-ARVRAT: 48 ML/MIN/1.73M2 (ref 60–?)
GFR SERPLBLD BASED ON 1.73 SQ M-ARVRAT: 48 ML/MIN/1.73M2 (ref 60–?)
GLOBULIN PLAS-MCNC: 3.7 G/DL (ref 2.8–4.4)
GLUCOSE BLD-MCNC: 193 MG/DL (ref 70–99)
GLUCOSE BLD-MCNC: 193 MG/DL (ref 70–99)
LDH SERPL L TO P-CCNC: 221 U/L
MAGNESIUM SERPL-MCNC: 1.6 MG/DL (ref 1.6–2.6)
OSMOLALITY SERPL CALC.SUM OF ELEC: 302 MOSM/KG (ref 275–295)
OSMOLALITY SERPL CALC.SUM OF ELEC: 302 MOSM/KG (ref 275–295)
PHOSPHATE SERPL-MCNC: 3.8 MG/DL (ref 2.5–4.9)
POTASSIUM SERPL-SCNC: 5.1 MMOL/L (ref 3.5–5.1)
POTASSIUM SERPL-SCNC: 5.1 MMOL/L (ref 3.5–5.1)
PROT SERPL-MCNC: 5.2 G/DL (ref 6.4–8.2)
PROT UR-MCNC: 282.4 MG/DL
SODIUM SERPL-SCNC: 133 MMOL/L (ref 136–145)
SODIUM SERPL-SCNC: 133 MMOL/L (ref 136–145)

## 2022-11-01 PROCEDURE — 82378 CARCINOEMBRYONIC ANTIGEN: CPT

## 2022-11-01 PROCEDURE — 36415 COLL VENOUS BLD VENIPUNCTURE: CPT

## 2022-11-01 PROCEDURE — 84156 ASSAY OF PROTEIN URINE: CPT

## 2022-11-01 PROCEDURE — 85025 COMPLETE CBC W/AUTO DIFF WBC: CPT

## 2022-11-01 PROCEDURE — 84100 ASSAY OF PHOSPHORUS: CPT

## 2022-11-01 PROCEDURE — 82570 ASSAY OF URINE CREATININE: CPT

## 2022-11-01 PROCEDURE — 83615 LACTATE (LD) (LDH) ENZYME: CPT

## 2022-11-01 PROCEDURE — 80158 DRUG ASSAY CYCLOSPORINE: CPT

## 2022-11-01 PROCEDURE — 83036 HEMOGLOBIN GLYCOSYLATED A1C: CPT

## 2022-11-01 PROCEDURE — 83735 ASSAY OF MAGNESIUM: CPT

## 2022-11-01 PROCEDURE — 80053 COMPREHEN METABOLIC PANEL: CPT

## 2022-11-01 NOTE — PROGRESS NOTES
ESTIVEN assisted with FMLA for pt's daughters. ESTIVEN faxed FMLA paperwork to [phone] for Miesha Chiu. ESTIVEN updated Iola Duane (ph: [phone]),    ESTIVEN faxed FMLA letter to [phone] for Benitez Fiore. Iola Duane will update Kishor Aden. Daniel Foster, LYNNW   at [facility], [place]    [address] [facility], [address] [place] [address], [address] [place] [address]  Edward Augustine [address] [place] [address] Yeyo Berumen  Ph: [phone]. [email or web address]  Fax: [phone]

## 2022-11-02 DIAGNOSIS — R80.9 PROTEINURIA: Primary | ICD-10-CM

## 2022-11-02 DIAGNOSIS — R73.9 BLOOD GLUCOSE ELEVATED: ICD-10-CM

## 2022-11-02 LAB — CYCLOSPORINE A: 164.5 NG/ML

## 2022-11-03 ENCOUNTER — TELEPHONE (OUTPATIENT)
Dept: NEPHROLOGY | Facility: CLINIC | Age: 77
End: 2022-11-03

## 2022-11-03 DIAGNOSIS — R89.7 PODOCYTE FOOT PROCESS EFFACEMENT PRESENT ON ELECTRON MICROSCOPY OF RENAL BIOPSY: ICD-10-CM

## 2022-11-03 DIAGNOSIS — N18.30 STAGE 3 CHRONIC KIDNEY DISEASE, UNSPECIFIED WHETHER STAGE 3A OR 3B CKD (HCC): Primary | ICD-10-CM

## 2022-11-03 DIAGNOSIS — R80.9 NEPHROTIC RANGE PROTEINURIA: ICD-10-CM

## 2022-11-03 RX ORDER — PREDNISONE 1 MG/1
5 TABLET ORAL DAILY
Qty: 30 TABLET | Refills: 3 | Status: SHIPPED | OUTPATIENT
Start: 2022-11-03

## 2022-11-07 ENCOUNTER — OFFICE VISIT (OUTPATIENT)
Dept: HEMATOLOGY/ONCOLOGY | Facility: HOSPITAL | Age: 77
End: 2022-11-07
Attending: INTERNAL MEDICINE
Payer: MEDICARE

## 2022-11-07 ENCOUNTER — SOCIAL WORK SERVICES (OUTPATIENT)
Dept: HEMATOLOGY/ONCOLOGY | Facility: HOSPITAL | Age: 77
End: 2022-11-07

## 2022-11-07 ENCOUNTER — TELEPHONE (OUTPATIENT)
Dept: HEMATOLOGY/ONCOLOGY | Age: 77
End: 2022-11-07

## 2022-11-07 VITALS
TEMPERATURE: 98 F | SYSTOLIC BLOOD PRESSURE: 96 MMHG | RESPIRATION RATE: 18 BRPM | HEART RATE: 98 BPM | OXYGEN SATURATION: 99 % | DIASTOLIC BLOOD PRESSURE: 64 MMHG

## 2022-11-07 DIAGNOSIS — R52 PAIN: Primary | ICD-10-CM

## 2022-11-07 DIAGNOSIS — Z71.89 GOALS OF CARE, COUNSELING/DISCUSSION: Primary | ICD-10-CM

## 2022-11-07 DIAGNOSIS — I82.4Y1 ACUTE DEEP VEIN THROMBOSIS (DVT) OF PROXIMAL VEIN OF RIGHT LOWER EXTREMITY (HCC): ICD-10-CM

## 2022-11-07 DIAGNOSIS — G89.3 NEOPLASM RELATED PAIN: ICD-10-CM

## 2022-11-07 DIAGNOSIS — K59.03 DRUG-INDUCED CONSTIPATION: ICD-10-CM

## 2022-11-07 DIAGNOSIS — Z71.89 GOALS OF CARE, COUNSELING/DISCUSSION: ICD-10-CM

## 2022-11-07 DIAGNOSIS — C34.91 NON-SMALL CELL CANCER OF RIGHT LUNG (HCC): ICD-10-CM

## 2022-11-07 DIAGNOSIS — R53.83 FATIGUE DUE TO TREATMENT: ICD-10-CM

## 2022-11-07 PROCEDURE — 99215 OFFICE O/P EST HI 40 MIN: CPT | Performed by: INTERNAL MEDICINE

## 2022-11-07 PROCEDURE — 99215 OFFICE O/P EST HI 40 MIN: CPT | Performed by: NURSE PRACTITIONER

## 2022-11-07 NOTE — TELEPHONE ENCOUNTER
Returned call to Ruth Dorman and patient. He is having increasing weakness, rectal pain rated as 10/10, incontinence of urine and stool. Apixaban    Nausea - Grade 2 - having \"some nausea\" using antiemetics, but not eating or drinking much \"maybe 1 can Ensure per day\" no vomiting. Dehydration - Grade 2 - having weakness and lightheadedness, encouraged to push fluids. Pain, having rectal pain, 10/10, using Norco 5/325 mg q 6 hours which does not seen to provide much relief. Diarrhea - will have soft incontinent dark to brown stool, wearing a diaper. Urine - will use a urinal but having more incontinence. Denies fevers, not really sob just weak, no chest pain. ACV at [address]5 in [place], infusion notified.

## 2022-11-07 NOTE — PROGRESS NOTES
Patient presents with:  Dehydration      Patient c/o feeling fatigue with shortness of breath- not eating and drinking much per spouse one ensure daily not enough water. Stools are loose and having pain in rectum and right side of lower back per patient- spouse states she noticed a little blood when whipping him possible sore on rectum.        Learner:  Patient and Spouse    Disease / Diagnosis: dehydration     Barriers / Limitations:  None   Comments:    Method:  Brief focused   Comments:    General Topics:  Plan of care reviewed   Comments:    Outcome:  Shows understanding   Comments:

## 2022-11-07 NOTE — PROGRESS NOTES
SW received a request from family to arrange for another hospice consult for family to choose. ESTIVEN faxed hospice referral to [facility] ([facility]- [facility]) ph: [phone], fax: [phone]. LYNN RodriguezW   at [facility], [facility], [facility], [facility] , [facility], [address]  Ph: [phone]. [email or web address]  Fax: [phone]

## 2022-11-09 ENCOUNTER — TELEPHONE (OUTPATIENT)
Dept: NEPHROLOGY | Facility: CLINIC | Age: 77
End: 2022-11-09

## 2022-11-09 ENCOUNTER — TELEPHONE (OUTPATIENT)
Dept: HEMATOLOGY/ONCOLOGY | Age: 77
End: 2022-11-09

## 2022-11-09 NOTE — TELEPHONE ENCOUNTER
Hospice nurse stated that cyclosporine that was prescribed is not covered. Nurse and patient are asking if prednisone can be increased instead.     Please advise

## 2022-11-09 NOTE — TELEPHONE ENCOUNTER
Keith Chen from [facility] is calling because the patient was admitted there(she didn't give date) and they are needing an update on the patient status.  Call back number is [phone]

## 2022-11-21 ENCOUNTER — TELEPHONE (OUTPATIENT)
Dept: HEMATOLOGY/ONCOLOGY | Facility: HOSPITAL | Age: 77
End: 2022-11-21

## 2022-11-21 NOTE — TELEPHONE ENCOUNTER
Patient wife calling and she just wanted to let Dr. Delfina iKm know she received her message and she really appreciates it and she wanted to say \"Thank You for everything\".

## 2022-11-21 NOTE — TELEPHONE ENCOUNTER
Per answering service  11/19/2022  11:17am    Jessie Martins from [facility].   Patienpassed away 11/19/2022 at 10:10am

## 2022-12-22 NOTE — PROCEDURES
[facility]    PATIENT'S NAME: Daniel Faviola   ATTENDING PHYSICIAN: Vinod Frias M.D. OPERATING PHYSICIAN: Vinod Frias M.D.    PATIENT ACCOUNT#:   [de-identified]    LOCATION:  [address] [place] [address]  MEDICAL RECORD #:   [id number]       DATE OF BIRTH:
Home

## 2023-08-18 NOTE — TELEPHONE ENCOUNTER
Patient has been scheduled with Dr. Kashinath.     Patient wife is calling because the patient has been having sever rectal pain, she states he has been having a hard time getting to the bathroom in time, he is very weak, and he has been having an increased bowl out put. She states this is all the symptoms he has been having. Call back number is [phone].

## (undated) DIAGNOSIS — M54.9 BACK PAIN: ICD-10-CM

## (undated) DIAGNOSIS — Z11.59 SCREENING EXAMINATION FOR ARTHROPOD-BORNE VIRAL DISEASE: ICD-10-CM

## (undated) DIAGNOSIS — Z01.818 PRE-OP TESTING: Primary | ICD-10-CM

## (undated) DIAGNOSIS — M79.89 LEG SWELLING: Primary | ICD-10-CM

## (undated) DIAGNOSIS — R89.7 PODOCYTE FOOT PROCESS EFFACEMENT PRESENT ON ELECTRON MICROSCOPY OF RENAL BIOPSY: Primary | ICD-10-CM

## (undated) DIAGNOSIS — R80.9 PROTEINURIA: Primary | ICD-10-CM

## (undated) DIAGNOSIS — R80.9 PROTEINURIA, UNSPECIFIED TYPE: Primary | ICD-10-CM

## (undated) DIAGNOSIS — R80.9 NEPHROTIC RANGE PROTEINURIA: ICD-10-CM

## (undated) DIAGNOSIS — R06.02 SHORTNESS OF BREATH: ICD-10-CM

## (undated) DIAGNOSIS — C34.81 MALIGNANT NEOPLASM OF OVERLAPPING SITES OF RIGHT LUNG (HCC): Primary | ICD-10-CM

## (undated) DIAGNOSIS — C34.81 MALIGNANT NEOPLASM OF OVERLAPPING SITES OF RIGHT LUNG (HCC): ICD-10-CM

## (undated) DEVICE — Device

## (undated) DEVICE — LAWSON - SOL IRR STL H20 1000ML POUR BT

## (undated) DEVICE — 1200CC GUARDIAN II: Brand: GUARDIAN

## (undated) DEVICE — LAWSON - WATER STL IRR PIC 1000ML

## (undated) DEVICE — 3M™ RED DOT™ MONITORING ELECTRODE WITH FOAM TAPE AND STICKY GEL, 50/BAG, 20/CASE, 72/PLT 2570: Brand: RED DOT™

## (undated) DEVICE — ENDOSCOPY PACK UPPER: Brand: MEDLINE INDUSTRIES, INC.

## (undated) DEVICE — MASK ETCO2 PANORAMIC

## (undated) DEVICE — ENDOSCOPIC ULTRASOUND FINE NEEDLE BIOPSY (FNB) DEVICE: Brand: ACQUIRE

## (undated) NOTE — LETTER
[facility] [address], [address]  Consent for Procedure/Sedation    Date:     Time:       1.  I authorize the performance upon Vanessa Rosas the following:cardiac catheterization, left ventricular cineangiography, bilateral karie period, the physician will determine when the applicable recovery period ends for purposes of reinstating the Do Not Resuscitate (DNR) order.     Signature of Patient: ____________________________________________________    Signature of person authorized

## (undated) NOTE — LETTER
[facility] [address], [address]  Consent for Procedure/Sedation    Date: January 4, 2018    Time: __________      1.  I authorize the performance upon Selam Gonzalez the following:cardiac catheterization, left ventricular cinean period, the physician will determine when the applicable recovery period ends for purposes of reinstating the Do Not Resuscitate (DNR) order.     Signature of Patient: ____________________________________________________    Signature of person authorized

## (undated) NOTE — LETTER
OSR/RAN Notification            To: Dr. John Patino                      Date:  4/28/2022  Fax #: [phone]    Patient Name: Brice Tyson / Sex: [birth year]-U: 65 y  male  Phone:  [phone]            CSN: [id number]                                   Medical Records: [id number]    Surgeon(s):  Dr. Taran Welch  Procedure:   CT BIOPSY RENAL  Anesthesia Type:  Surgery Date: 05/03/2022    During the pre-operative screening process using the STOP-Bang questionnaire, the patient listed above was identified as being at high risk for obstructive sleep apnea. If you feel it is appropriate to schedule a sleep study, the [facility] will give priority to patients scheduled for procedures to minimize surgical delays. If a sleep study is completed prior to surgery, please fax the results/plan of care to Yanet Carlosmarian Olivares ([phone]) as this information will be utilized in clinical decision-making regarding the patient's upcoming surgery. Thank you for your assistance in helping us provide a safe, seamless and personal experience for your patient.     Megan Henning MD, PhD  [facility], Department of Anesthesia  [facility], Sleep Apnea Task Force    Rev 2/12/14

## (undated) NOTE — LETTER
Printed: 2019    Patient Name: Dolly Avendano  : 2/3/1945   Medical Record #: [id number]    Consent to [address], understand that I have been diagnosed with metastatic lung cancer.     I understand that the treatme I understand that I could have side effects from my treatment that are not listed on this form. Each patient can respond differently to medications, and could have side effects that have not been reported by others.  I understand that complications from th and have answered any such questions.       Date:__________  Time:________ Signed:_________________________________      Patient Name: Curt lAejandro  : 2/3/1945   Medical Record #: [id number]

## (undated) NOTE — Clinical Note
Reymundo Hall Sames Mr. Aleks Lopes today. 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 I have asked for some serologic w/u, but will likely biopsy his kidneys next week( he is on plavix which will need to be held for about 5 days). Is it possible to hold further immunotherapy in the meantime (he tells me he is due to a tx next week)?     Thanks      [phone]

## (undated) NOTE — LETTER
Printed: 2021    Patient Name: Selam Gonzalez  : 2/3/1945   Medical Record #: [id number]    Consent to [address], understand that I have been diagnosed with Lung cancer.     I understand that the treatment suggested be given to me prior to start of therapy. Additionally, I will receive a copy of this consent form. I have read and fully understand this consent to cancer treatment. I acknowledge that the explanations above were made.  The physicians have answered a